# Patient Record
Sex: FEMALE | Race: WHITE | Employment: UNEMPLOYED | URBAN - METROPOLITAN AREA
[De-identification: names, ages, dates, MRNs, and addresses within clinical notes are randomized per-mention and may not be internally consistent; named-entity substitution may affect disease eponyms.]

---

## 2017-02-09 DIAGNOSIS — I10 ESSENTIAL HYPERTENSION: ICD-10-CM

## 2017-02-10 RX ORDER — LISINOPRIL 30 MG/1
TABLET ORAL
Qty: 30 TABLET | Refills: 0 | Status: SHIPPED | OUTPATIENT
Start: 2017-02-10 | End: 2017-03-15 | Stop reason: SDUPTHER

## 2017-03-15 DIAGNOSIS — I10 ESSENTIAL HYPERTENSION: ICD-10-CM

## 2017-03-15 RX ORDER — LISINOPRIL 30 MG/1
TABLET ORAL
Qty: 30 TABLET | Refills: 0 | Status: SHIPPED | OUTPATIENT
Start: 2017-03-15 | End: 2017-04-13 | Stop reason: SDUPTHER

## 2017-04-13 DIAGNOSIS — I10 ESSENTIAL HYPERTENSION: ICD-10-CM

## 2017-04-13 RX ORDER — LISINOPRIL 30 MG/1
TABLET ORAL
Qty: 30 TABLET | Refills: 0 | Status: SHIPPED | OUTPATIENT
Start: 2017-04-13 | End: 2017-05-17 | Stop reason: SDUPTHER

## 2017-05-02 ENCOUNTER — TELEPHONE (OUTPATIENT)
Dept: FAMILY MEDICINE CLINIC | Age: 62
End: 2017-05-02

## 2017-05-02 ENCOUNTER — OFFICE VISIT (OUTPATIENT)
Dept: FAMILY MEDICINE CLINIC | Age: 62
End: 2017-05-02

## 2017-05-02 VITALS
DIASTOLIC BLOOD PRESSURE: 78 MMHG | HEART RATE: 112 BPM | SYSTOLIC BLOOD PRESSURE: 150 MMHG | HEIGHT: 68 IN | BODY MASS INDEX: 31.52 KG/M2 | WEIGHT: 208 LBS | TEMPERATURE: 99.3 F | OXYGEN SATURATION: 98 %

## 2017-05-02 DIAGNOSIS — E11.9 TYPE 2 DIABETES MELLITUS WITHOUT COMPLICATION, WITHOUT LONG-TERM CURRENT USE OF INSULIN (HCC): ICD-10-CM

## 2017-05-02 DIAGNOSIS — M53.3 COCCYDYNIA: ICD-10-CM

## 2017-05-02 DIAGNOSIS — F41.1 GAD (GENERALIZED ANXIETY DISORDER): Primary | ICD-10-CM

## 2017-05-02 DIAGNOSIS — G89.29 CHRONIC PAIN OF LEFT KNEE: ICD-10-CM

## 2017-05-02 DIAGNOSIS — M25.562 CHRONIC PAIN OF LEFT KNEE: ICD-10-CM

## 2017-05-02 DIAGNOSIS — E66.9 OBESITY (BMI 30.0-34.9): ICD-10-CM

## 2017-05-02 DIAGNOSIS — F41.1 GAD (GENERALIZED ANXIETY DISORDER): ICD-10-CM

## 2017-05-02 DIAGNOSIS — I10 ESSENTIAL HYPERTENSION: Primary | ICD-10-CM

## 2017-05-02 LAB
A/G RATIO: 1.6 (ref 1.1–2.2)
ALBUMIN SERPL-MCNC: 4.7 G/DL (ref 3.4–5)
ALP BLD-CCNC: 59 U/L (ref 40–129)
ALT SERPL-CCNC: 20 U/L (ref 10–40)
ANION GAP SERPL CALCULATED.3IONS-SCNC: 16 MMOL/L (ref 3–16)
AST SERPL-CCNC: 17 U/L (ref 15–37)
BILIRUB SERPL-MCNC: 0.5 MG/DL (ref 0–1)
BUN BLDV-MCNC: 19 MG/DL (ref 7–20)
CALCIUM SERPL-MCNC: 9.6 MG/DL (ref 8.3–10.6)
CHLORIDE BLD-SCNC: 104 MMOL/L (ref 99–110)
CHOLESTEROL, TOTAL: 165 MG/DL (ref 0–199)
CO2: 22 MMOL/L (ref 21–32)
CREAT SERPL-MCNC: 0.5 MG/DL (ref 0.6–1.2)
CREATININE URINE: 128.9 MG/DL (ref 28–259)
GFR AFRICAN AMERICAN: >60
GFR NON-AFRICAN AMERICAN: >60
GLOBULIN: 3 G/DL
GLUCOSE BLD-MCNC: 136 MG/DL (ref 70–99)
HDLC SERPL-MCNC: 59 MG/DL (ref 40–60)
LDL CHOLESTEROL CALCULATED: 89 MG/DL
MICROALBUMIN UR-MCNC: <1.2 MG/DL
MICROALBUMIN/CREAT UR-RTO: NORMAL MG/G (ref 0–30)
POTASSIUM SERPL-SCNC: 3.8 MMOL/L (ref 3.5–5.1)
SODIUM BLD-SCNC: 142 MMOL/L (ref 136–145)
TOTAL PROTEIN: 7.7 G/DL (ref 6.4–8.2)
TRIGL SERPL-MCNC: 85 MG/DL (ref 0–150)
VLDLC SERPL CALC-MCNC: 17 MG/DL

## 2017-05-02 PROCEDURE — 99214 OFFICE O/P EST MOD 30 MIN: CPT | Performed by: FAMILY MEDICINE

## 2017-05-02 PROCEDURE — 36415 COLL VENOUS BLD VENIPUNCTURE: CPT | Performed by: FAMILY MEDICINE

## 2017-05-02 RX ORDER — ALPRAZOLAM 0.5 MG/1
0.5 TABLET ORAL DAILY PRN
Qty: 15 TABLET | Refills: 0 | Status: SHIPPED | OUTPATIENT
Start: 2017-05-02 | End: 2017-06-01

## 2017-05-03 LAB
ESTIMATED AVERAGE GLUCOSE: 148.5 MG/DL
HBA1C MFR BLD: 6.8 %

## 2017-06-08 ENCOUNTER — TELEPHONE (OUTPATIENT)
Dept: FAMILY MEDICINE CLINIC | Age: 62
End: 2017-06-08

## 2017-11-20 PROBLEM — Z91.199 NONCOMPLIANCE BY REFUSING INTERVENTION OR SUPPORT: Status: ACTIVE | Noted: 2017-11-20

## 2017-11-21 ENCOUNTER — OFFICE VISIT (OUTPATIENT)
Dept: FAMILY MEDICINE CLINIC | Age: 62
End: 2017-11-21

## 2017-11-21 VITALS
TEMPERATURE: 98 F | OXYGEN SATURATION: 98 % | SYSTOLIC BLOOD PRESSURE: 138 MMHG | BODY MASS INDEX: 30.31 KG/M2 | HEIGHT: 68 IN | HEART RATE: 119 BPM | WEIGHT: 200 LBS | DIASTOLIC BLOOD PRESSURE: 78 MMHG

## 2017-11-21 DIAGNOSIS — F41.9 ANXIETY: ICD-10-CM

## 2017-11-21 DIAGNOSIS — Z23 NEED FOR INFLUENZA VACCINATION: ICD-10-CM

## 2017-11-21 DIAGNOSIS — Z00.00 PREVENTATIVE HEALTH CARE: ICD-10-CM

## 2017-11-21 DIAGNOSIS — E11.9 TYPE 2 DIABETES MELLITUS WITHOUT COMPLICATION, WITHOUT LONG-TERM CURRENT USE OF INSULIN (HCC): ICD-10-CM

## 2017-11-21 DIAGNOSIS — I10 ESSENTIAL HYPERTENSION: Primary | ICD-10-CM

## 2017-11-21 LAB — HBA1C MFR BLD: 6.4 %

## 2017-11-21 PROCEDURE — 90630 INFLUENZA, QUADV, 18-64 YRS, ID, PF, MICRO INJ, 0.1ML (FLUZONE QUADV, PF): CPT | Performed by: FAMILY MEDICINE

## 2017-11-21 PROCEDURE — 90471 IMMUNIZATION ADMIN: CPT | Performed by: FAMILY MEDICINE

## 2017-11-21 PROCEDURE — 83036 HEMOGLOBIN GLYCOSYLATED A1C: CPT | Performed by: FAMILY MEDICINE

## 2017-11-21 PROCEDURE — 99214 OFFICE O/P EST MOD 30 MIN: CPT | Performed by: FAMILY MEDICINE

## 2017-11-21 RX ORDER — LISINOPRIL 30 MG/1
TABLET ORAL
Qty: 30 TABLET | Refills: 5 | Status: SHIPPED | OUTPATIENT
Start: 2017-11-21 | End: 2018-05-29 | Stop reason: SDUPTHER

## 2017-11-21 ASSESSMENT — ENCOUNTER SYMPTOMS
SORE THROAT: 0
ROS SKIN COMMENTS: DRY HANDS

## 2017-11-21 NOTE — PATIENT INSTRUCTIONS
Cognitive behavioral therapy - discuss w/ therapist.    Patient Education        Learning About Diabetes Food Guidelines  Your Care Instructions  Meal planning is important to manage diabetes. It helps keep your blood sugar at a target level (which you set with your doctor). You don't have to eat special foods. You can eat what your family eats, including sweets once in a while. But you do have to pay attention to how often you eat and how much you eat of certain foods. You may want to work with a dietitian or a certified diabetes educator (CDE) to help you plan meals and snacks. A dietitian or CDE can also help you lose weight if that is one of your goals. What should you know about eating carbs? Managing the amount of carbohydrate (carbs) you eat is an important part of healthy meals when you have diabetes. Carbohydrate is found in many foods. · Learn which foods have carbs. And learn the amounts of carbs in different foods. ¨ Bread, cereal, pasta, and rice have about 15 grams of carbs in a serving. A serving is 1 slice of bread (1 ounce), ½ cup of cooked cereal, or 1/3 cup of cooked pasta or rice. ¨ Fruits have 15 grams of carbs in a serving. A serving is 1 small fresh fruit, such as an apple or orange; ½ of a banana; ½ cup of cooked or canned fruit; ½ cup of fruit juice; 1 cup of melon or raspberries; or 2 tablespoons of dried fruit. ¨ Milk and no-sugar-added yogurt have 15 grams of carbs in a serving. A serving is 1 cup of milk or 2/3 cup of no-sugar-added yogurt. ¨ Starchy vegetables have 15 grams of carbs in a serving. A serving is ½ cup of mashed potatoes or sweet potato; 1 cup winter squash; ½ of a small baked potato; ½ cup of cooked beans; or ½ cup cooked corn or green peas. · Learn how much carbs to eat each day and at each meal. A dietitian or CDE can teach you how to keep track of the amount of carbs you eat. This is called carbohydrate counting.   · If you are not sure how to count carbohydrate grams, use the Plate Method to plan meals. It is a good, quick way to make sure that you have a balanced meal. It also helps you spread carbs throughout the day. ¨ Divide your plate by types of foods. Put non-starchy vegetables on half the plate, meat or other protein food on one-quarter of the plate, and a grain or starchy vegetable in the final quarter of the plate. To this you can add a small piece of fruit and 1 cup of milk or yogurt, depending on how many carbs you are supposed to eat at a meal.  · Try to eat about the same amount of carbs at each meal. Do not \"save up\" your daily allowance of carbs to eat at one meal.  · Proteins have very little or no carbs per serving. Examples of proteins are beef, chicken, turkey, fish, eggs, tofu, cheese, cottage cheese, and peanut butter. A serving size of meat is 3 ounces, which is about the size of a deck of cards. Examples of meat substitute serving sizes (equal to 1 ounce of meat) are 1/4 cup of cottage cheese, 1 egg, 1 tablespoon of peanut butter, and ½ cup of tofu. How can you eat out and still eat healthy? · Learn to estimate the serving sizes of foods that have carbohydrate. If you measure food at home, it will be easier to estimate the amount in a serving of restaurant food. · If the meal you order has too much carbohydrate (such as potatoes, corn, or baked beans), ask to have a low-carbohydrate food instead. Ask for a salad or green vegetables. · If you use insulin, check your blood sugar before and after eating out to help you plan how much to eat in the future. · If you eat more carbohydrate at a meal than you had planned, take a walk or do other exercise. This will help lower your blood sugar. What else should you know? · Limit saturated fat, such as the fat from meat and dairy products. This is a healthy choice because people who have diabetes are at higher risk of heart disease. So choose lean cuts of meat and nonfat or low-fat dairy products.  Use

## 2017-11-21 NOTE — PROGRESS NOTES
Subjective:      Patient ID: Saundra Graham is a 57 y.o. female. HPI Hypertension -     patient is here for f/u. No c/o. No problem w/ meds; denies any symptoms referable to elevated blood pressure. Denies side effects of meds. Needs refills. Diabetes mellitus 2 - doing well w/ current diet. Needs labs to check status. eczema - washes hands often. Uses Aveeno lotion only. Anxiety - Has repeatedly refuse medications. Continue to see her counselor. This counseling sessions involve her mostly relating what stressful events occurred the previous 2 weeks. The counselor does not offer any and's or advice for change usually nor ways that she might relieve her anxiety prior to stressful situation. She says that she has been going to this same counselor since before she was even . HM - patient refuses all preventative health care. She mostly does this because they will provoke her anxiety. She is always worried that the test may come back abnormal.    Review of Systems   Constitutional: Negative for unexpected weight change. HENT: Negative for sore throat. Endocrine: Negative for polydipsia and polyuria. Skin:        Dry hands   Allergic/Immunologic: Negative for immunocompromised state. Psychiatric/Behavioral: The patient is nervous/anxious. Objective:   Physical Exam   Constitutional: She is oriented to person, place, and time. She appears well-developed and well-nourished. HENT:   Head: Normocephalic and atraumatic. Eyes: Conjunctivae are normal. No scleral icterus. Neck: Neck supple. Carotid bruit is not present. No thyromegaly present. Cardiovascular: Normal rate, regular rhythm and normal heart sounds. Pulmonary/Chest: Effort normal and breath sounds normal.   Musculoskeletal: She exhibits no edema. Lymphadenopathy:     She has no cervical adenopathy. Neurological: She is alert and oriented to person, place, and time. Skin: Skin is warm and dry.  Rash

## 2018-05-29 DIAGNOSIS — I10 ESSENTIAL HYPERTENSION: ICD-10-CM

## 2018-05-29 RX ORDER — LISINOPRIL 30 MG/1
TABLET ORAL
Qty: 30 TABLET | Refills: 0 | Status: SHIPPED | OUTPATIENT
Start: 2018-05-29 | End: 2018-06-28 | Stop reason: SDUPTHER

## 2018-06-28 DIAGNOSIS — I10 ESSENTIAL HYPERTENSION: ICD-10-CM

## 2018-06-28 RX ORDER — LISINOPRIL 30 MG/1
TABLET ORAL
Qty: 30 TABLET | Refills: 0 | Status: SHIPPED | OUTPATIENT
Start: 2018-06-28 | End: 2018-08-28 | Stop reason: SDUPTHER

## 2018-08-28 DIAGNOSIS — I10 ESSENTIAL HYPERTENSION: ICD-10-CM

## 2018-08-28 RX ORDER — LISINOPRIL 30 MG/1
TABLET ORAL
Qty: 30 TABLET | Refills: 0 | OUTPATIENT
Start: 2018-08-28

## 2018-08-28 RX ORDER — LISINOPRIL 30 MG/1
TABLET ORAL
Qty: 30 TABLET | Refills: 1 | Status: SHIPPED | OUTPATIENT
Start: 2018-08-28 | End: 2018-10-05 | Stop reason: SDUPTHER

## 2018-10-05 ENCOUNTER — OFFICE VISIT (OUTPATIENT)
Dept: FAMILY MEDICINE CLINIC | Age: 63
End: 2018-10-05
Payer: MEDICARE

## 2018-10-05 VITALS
BODY MASS INDEX: 32.78 KG/M2 | HEART RATE: 116 BPM | WEIGHT: 204 LBS | HEIGHT: 66 IN | OXYGEN SATURATION: 98 % | DIASTOLIC BLOOD PRESSURE: 86 MMHG | SYSTOLIC BLOOD PRESSURE: 150 MMHG

## 2018-10-05 DIAGNOSIS — I10 ESSENTIAL HYPERTENSION: ICD-10-CM

## 2018-10-05 DIAGNOSIS — G89.29 CHRONIC PAIN OF LEFT KNEE: ICD-10-CM

## 2018-10-05 DIAGNOSIS — Z12.39 SCREENING FOR BREAST CANCER: ICD-10-CM

## 2018-10-05 DIAGNOSIS — F41.9 ANXIETY: ICD-10-CM

## 2018-10-05 DIAGNOSIS — Z23 NEED FOR INFLUENZA VACCINATION: ICD-10-CM

## 2018-10-05 DIAGNOSIS — M25.562 CHRONIC PAIN OF LEFT KNEE: ICD-10-CM

## 2018-10-05 DIAGNOSIS — M25.571 ACUTE RIGHT ANKLE PAIN: ICD-10-CM

## 2018-10-05 DIAGNOSIS — E11.9 TYPE 2 DIABETES MELLITUS WITHOUT COMPLICATION, WITHOUT LONG-TERM CURRENT USE OF INSULIN (HCC): Primary | ICD-10-CM

## 2018-10-05 LAB
A/G RATIO: 1.9 (ref 1.1–2.2)
ALBUMIN SERPL-MCNC: 5 G/DL (ref 3.4–5)
ALP BLD-CCNC: 56 U/L (ref 40–129)
ALT SERPL-CCNC: 17 U/L (ref 10–40)
ANION GAP SERPL CALCULATED.3IONS-SCNC: 16 MMOL/L (ref 3–16)
AST SERPL-CCNC: 17 U/L (ref 15–37)
BASOPHILS ABSOLUTE: 0 K/UL (ref 0–0.2)
BASOPHILS RELATIVE PERCENT: 0.8 %
BILIRUB SERPL-MCNC: 0.4 MG/DL (ref 0–1)
BUN BLDV-MCNC: 21 MG/DL (ref 7–20)
CALCIUM SERPL-MCNC: 9.8 MG/DL (ref 8.3–10.6)
CHLORIDE BLD-SCNC: 104 MMOL/L (ref 99–110)
CHOLESTEROL, TOTAL: 183 MG/DL (ref 0–199)
CO2: 22 MMOL/L (ref 21–32)
CREAT SERPL-MCNC: 0.6 MG/DL (ref 0.6–1.2)
EOSINOPHILS ABSOLUTE: 0.1 K/UL (ref 0–0.6)
EOSINOPHILS RELATIVE PERCENT: 2 %
GFR AFRICAN AMERICAN: >60
GFR NON-AFRICAN AMERICAN: >60
GLOBULIN: 2.6 G/DL
GLUCOSE BLD-MCNC: 152 MG/DL (ref 70–99)
HCT VFR BLD CALC: 40.6 % (ref 36–48)
HDLC SERPL-MCNC: 55 MG/DL (ref 40–60)
HEMOGLOBIN: 13.6 G/DL (ref 12–16)
LDL CHOLESTEROL CALCULATED: 112 MG/DL
LYMPHOCYTES ABSOLUTE: 1.5 K/UL (ref 1–5.1)
LYMPHOCYTES RELATIVE PERCENT: 32.4 %
MCH RBC QN AUTO: 30 PG (ref 26–34)
MCHC RBC AUTO-ENTMCNC: 33.5 G/DL (ref 31–36)
MCV RBC AUTO: 89.7 FL (ref 80–100)
MONOCYTES ABSOLUTE: 0.3 K/UL (ref 0–1.3)
MONOCYTES RELATIVE PERCENT: 6.9 %
NEUTROPHILS ABSOLUTE: 2.7 K/UL (ref 1.7–7.7)
NEUTROPHILS RELATIVE PERCENT: 57.9 %
PDW BLD-RTO: 13.5 % (ref 12.4–15.4)
PLATELET # BLD: 213 K/UL (ref 135–450)
PMV BLD AUTO: 7.2 FL (ref 5–10.5)
POTASSIUM SERPL-SCNC: 4 MMOL/L (ref 3.5–5.1)
RBC # BLD: 4.53 M/UL (ref 4–5.2)
SODIUM BLD-SCNC: 142 MMOL/L (ref 136–145)
TOTAL PROTEIN: 7.6 G/DL (ref 6.4–8.2)
TRIGL SERPL-MCNC: 78 MG/DL (ref 0–150)
VLDLC SERPL CALC-MCNC: 16 MG/DL
WBC # BLD: 4.6 K/UL (ref 4–11)

## 2018-10-05 PROCEDURE — 3017F COLORECTAL CA SCREEN DOC REV: CPT | Performed by: NURSE PRACTITIONER

## 2018-10-05 PROCEDURE — G8417 CALC BMI ABV UP PARAM F/U: HCPCS | Performed by: NURSE PRACTITIONER

## 2018-10-05 PROCEDURE — 99214 OFFICE O/P EST MOD 30 MIN: CPT | Performed by: NURSE PRACTITIONER

## 2018-10-05 PROCEDURE — 36415 COLL VENOUS BLD VENIPUNCTURE: CPT | Performed by: NURSE PRACTITIONER

## 2018-10-05 PROCEDURE — 1090F PRES/ABSN URINE INCON ASSESS: CPT | Performed by: NURSE PRACTITIONER

## 2018-10-05 PROCEDURE — 2022F DILAT RTA XM EVC RTNOPTHY: CPT | Performed by: NURSE PRACTITIONER

## 2018-10-05 PROCEDURE — 90662 IIV NO PRSV INCREASED AG IM: CPT | Performed by: NURSE PRACTITIONER

## 2018-10-05 PROCEDURE — G8400 PT W/DXA NO RESULTS DOC: HCPCS | Performed by: NURSE PRACTITIONER

## 2018-10-05 PROCEDURE — 1036F TOBACCO NON-USER: CPT | Performed by: NURSE PRACTITIONER

## 2018-10-05 PROCEDURE — G8427 DOCREV CUR MEDS BY ELIG CLIN: HCPCS | Performed by: NURSE PRACTITIONER

## 2018-10-05 PROCEDURE — 1101F PT FALLS ASSESS-DOCD LE1/YR: CPT | Performed by: NURSE PRACTITIONER

## 2018-10-05 PROCEDURE — G8482 FLU IMMUNIZE ORDER/ADMIN: HCPCS | Performed by: NURSE PRACTITIONER

## 2018-10-05 PROCEDURE — 1123F ACP DISCUSS/DSCN MKR DOCD: CPT | Performed by: NURSE PRACTITIONER

## 2018-10-05 PROCEDURE — 3046F HEMOGLOBIN A1C LEVEL >9.0%: CPT | Performed by: NURSE PRACTITIONER

## 2018-10-05 PROCEDURE — G0008 ADMIN INFLUENZA VIRUS VAC: HCPCS | Performed by: NURSE PRACTITIONER

## 2018-10-05 PROCEDURE — 4040F PNEUMOC VAC/ADMIN/RCVD: CPT | Performed by: NURSE PRACTITIONER

## 2018-10-05 RX ORDER — LISINOPRIL 30 MG/1
TABLET ORAL
Qty: 30 TABLET | Refills: 5 | Status: SHIPPED | OUTPATIENT
Start: 2018-10-05 | End: 2019-04-11 | Stop reason: SDUPTHER

## 2018-10-05 RX ORDER — IBUPROFEN 800 MG/1
800 TABLET ORAL EVERY 8 HOURS PRN
Qty: 120 TABLET | Refills: 0 | Status: SHIPPED | OUTPATIENT
Start: 2018-10-05 | End: 2019-11-20

## 2018-10-05 RX ORDER — LISINOPRIL 5 MG/1
5 TABLET ORAL DAILY
Qty: 30 TABLET | Refills: 3 | Status: SHIPPED | OUTPATIENT
Start: 2018-10-05 | End: 2019-01-17 | Stop reason: SDUPTHER

## 2018-10-05 ASSESSMENT — ENCOUNTER SYMPTOMS
BLOOD IN STOOL: 0
CONSTIPATION: 0
SHORTNESS OF BREATH: 0
DIARRHEA: 0
BLURRED VISION: 0

## 2018-10-05 ASSESSMENT — PATIENT HEALTH QUESTIONNAIRE - PHQ9
2. FEELING DOWN, DEPRESSED OR HOPELESS: 0
1. LITTLE INTEREST OR PLEASURE IN DOING THINGS: 0
SUM OF ALL RESPONSES TO PHQ9 QUESTIONS 1 & 2: 0
SUM OF ALL RESPONSES TO PHQ QUESTIONS 1-9: 0
SUM OF ALL RESPONSES TO PHQ QUESTIONS 1-9: 0

## 2018-10-06 LAB
CREATININE URINE: 78.4 MG/DL (ref 28–259)
ESTIMATED AVERAGE GLUCOSE: 157.1 MG/DL
HBA1C MFR BLD: 7.1 %
MICROALBUMIN UR-MCNC: <1.2 MG/DL
MICROALBUMIN/CREAT UR-RTO: NORMAL MG/G (ref 0–30)

## 2019-04-11 DIAGNOSIS — I10 ESSENTIAL HYPERTENSION: ICD-10-CM

## 2019-04-11 RX ORDER — LISINOPRIL 30 MG/1
TABLET ORAL
Qty: 30 TABLET | Refills: 4 | Status: SHIPPED | OUTPATIENT
Start: 2019-04-11 | End: 2019-08-22 | Stop reason: SDUPTHER

## 2019-05-31 DIAGNOSIS — I10 ESSENTIAL HYPERTENSION: ICD-10-CM

## 2019-05-31 RX ORDER — LISINOPRIL 5 MG/1
TABLET ORAL
Qty: 30 TABLET | Refills: 2 | Status: SHIPPED | OUTPATIENT
Start: 2019-05-31 | End: 2019-10-28 | Stop reason: ALTCHOICE

## 2019-08-27 ENCOUNTER — OFFICE VISIT (OUTPATIENT)
Dept: ORTHOPEDIC SURGERY | Age: 64
End: 2019-08-27
Payer: MEDICARE

## 2019-08-27 VITALS
HEIGHT: 66 IN | DIASTOLIC BLOOD PRESSURE: 94 MMHG | HEART RATE: 101 BPM | WEIGHT: 203.93 LBS | SYSTOLIC BLOOD PRESSURE: 145 MMHG | BODY MASS INDEX: 32.77 KG/M2

## 2019-08-27 DIAGNOSIS — M21.41 PES PLANUS OF RIGHT FOOT: ICD-10-CM

## 2019-08-27 DIAGNOSIS — M79.671 RIGHT FOOT PAIN: Primary | ICD-10-CM

## 2019-08-27 PROCEDURE — 1123F ACP DISCUSS/DSCN MKR DOCD: CPT | Performed by: NURSE PRACTITIONER

## 2019-08-27 PROCEDURE — 1090F PRES/ABSN URINE INCON ASSESS: CPT | Performed by: NURSE PRACTITIONER

## 2019-08-27 PROCEDURE — 99213 OFFICE O/P EST LOW 20 MIN: CPT | Performed by: NURSE PRACTITIONER

## 2019-08-27 PROCEDURE — G8427 DOCREV CUR MEDS BY ELIG CLIN: HCPCS | Performed by: NURSE PRACTITIONER

## 2019-08-27 PROCEDURE — G8417 CALC BMI ABV UP PARAM F/U: HCPCS | Performed by: NURSE PRACTITIONER

## 2019-08-27 PROCEDURE — G8400 PT W/DXA NO RESULTS DOC: HCPCS | Performed by: NURSE PRACTITIONER

## 2019-08-27 PROCEDURE — 1036F TOBACCO NON-USER: CPT | Performed by: NURSE PRACTITIONER

## 2019-08-27 PROCEDURE — 4040F PNEUMOC VAC/ADMIN/RCVD: CPT | Performed by: NURSE PRACTITIONER

## 2019-08-27 PROCEDURE — 3017F COLORECTAL CA SCREEN DOC REV: CPT | Performed by: NURSE PRACTITIONER

## 2019-10-02 ENCOUNTER — HOSPITAL ENCOUNTER (OUTPATIENT)
Dept: PHYSICAL THERAPY | Age: 64
Setting detail: THERAPIES SERIES
Discharge: HOME | End: 2019-10-02
Payer: MEDICARE

## 2019-10-02 PROCEDURE — 97161 PT EVAL LOW COMPLEX 20 MIN: CPT

## 2019-10-02 PROCEDURE — 97110 THERAPEUTIC EXERCISES: CPT

## 2019-10-07 ENCOUNTER — HOSPITAL ENCOUNTER (OUTPATIENT)
Dept: PHYSICAL THERAPY | Age: 64
Setting detail: THERAPIES SERIES
Discharge: HOME | End: 2019-10-07
Payer: MEDICARE

## 2019-10-07 PROCEDURE — 97110 THERAPEUTIC EXERCISES: CPT

## 2019-10-07 PROCEDURE — 97140 MANUAL THERAPY 1/> REGIONS: CPT

## 2019-10-10 ENCOUNTER — HOSPITAL ENCOUNTER (OUTPATIENT)
Dept: PHYSICAL THERAPY | Age: 64
Setting detail: THERAPIES SERIES
Discharge: HOME | End: 2019-10-10
Payer: MEDICARE

## 2019-10-10 PROCEDURE — 97140 MANUAL THERAPY 1/> REGIONS: CPT

## 2019-10-10 PROCEDURE — 97110 THERAPEUTIC EXERCISES: CPT

## 2019-10-14 ENCOUNTER — HOSPITAL ENCOUNTER (OUTPATIENT)
Dept: PHYSICAL THERAPY | Age: 64
Setting detail: THERAPIES SERIES
Discharge: HOME | End: 2019-10-14
Payer: MEDICARE

## 2019-10-14 PROCEDURE — 97110 THERAPEUTIC EXERCISES: CPT | Performed by: PHYSICAL THERAPIST

## 2019-10-14 PROCEDURE — 97140 MANUAL THERAPY 1/> REGIONS: CPT | Performed by: PHYSICAL THERAPIST

## 2019-10-17 ENCOUNTER — HOSPITAL ENCOUNTER (OUTPATIENT)
Dept: PHYSICAL THERAPY | Age: 64
Setting detail: THERAPIES SERIES
Discharge: HOME | End: 2019-10-17
Payer: MEDICARE

## 2019-10-17 PROCEDURE — 97140 MANUAL THERAPY 1/> REGIONS: CPT

## 2019-10-17 PROCEDURE — 97110 THERAPEUTIC EXERCISES: CPT

## 2019-10-17 PROCEDURE — 97112 NEUROMUSCULAR REEDUCATION: CPT

## 2019-10-21 ENCOUNTER — HOSPITAL ENCOUNTER (OUTPATIENT)
Dept: PHYSICAL THERAPY | Age: 64
Setting detail: THERAPIES SERIES
Discharge: HOME | End: 2019-10-21
Payer: MEDICARE

## 2019-10-21 PROCEDURE — 97110 THERAPEUTIC EXERCISES: CPT | Performed by: PHYSICAL THERAPIST

## 2019-10-21 PROCEDURE — 97140 MANUAL THERAPY 1/> REGIONS: CPT | Performed by: PHYSICAL THERAPIST

## 2019-10-24 ENCOUNTER — HOSPITAL ENCOUNTER (OUTPATIENT)
Dept: PHYSICAL THERAPY | Age: 64
Setting detail: THERAPIES SERIES
Discharge: HOME | End: 2019-10-24
Payer: MEDICARE

## 2019-10-24 PROCEDURE — 97140 MANUAL THERAPY 1/> REGIONS: CPT

## 2019-10-24 PROCEDURE — 97110 THERAPEUTIC EXERCISES: CPT

## 2019-10-28 ENCOUNTER — HOSPITAL ENCOUNTER (OUTPATIENT)
Dept: PHYSICAL THERAPY | Age: 64
Setting detail: THERAPIES SERIES
Discharge: HOME | End: 2019-10-28
Payer: MEDICARE

## 2019-10-28 DIAGNOSIS — I10 ESSENTIAL HYPERTENSION: ICD-10-CM

## 2019-10-28 PROCEDURE — 97140 MANUAL THERAPY 1/> REGIONS: CPT | Performed by: PHYSICAL THERAPIST

## 2019-10-28 PROCEDURE — 97110 THERAPEUTIC EXERCISES: CPT | Performed by: PHYSICAL THERAPIST

## 2019-10-28 RX ORDER — LISINOPRIL 30 MG/1
30 TABLET ORAL DAILY
Qty: 90 TABLET | Refills: 0 | Status: SHIPPED | OUTPATIENT
Start: 2019-10-28 | End: 2019-11-27 | Stop reason: SDUPTHER

## 2019-10-31 ENCOUNTER — HOSPITAL ENCOUNTER (OUTPATIENT)
Dept: PHYSICAL THERAPY | Age: 64
Setting detail: THERAPIES SERIES
Discharge: HOME | End: 2019-10-31
Payer: MEDICARE

## 2019-10-31 PROCEDURE — 97112 NEUROMUSCULAR REEDUCATION: CPT

## 2019-10-31 PROCEDURE — 97140 MANUAL THERAPY 1/> REGIONS: CPT

## 2019-10-31 PROCEDURE — 97110 THERAPEUTIC EXERCISES: CPT

## 2019-11-05 ENCOUNTER — HOSPITAL ENCOUNTER (OUTPATIENT)
Dept: PHYSICAL THERAPY | Age: 64
Setting detail: THERAPIES SERIES
Discharge: HOME | End: 2019-11-05
Payer: MEDICARE

## 2019-11-05 PROCEDURE — 97140 MANUAL THERAPY 1/> REGIONS: CPT

## 2019-11-05 PROCEDURE — 97110 THERAPEUTIC EXERCISES: CPT

## 2019-11-06 ENCOUNTER — APPOINTMENT (OUTPATIENT)
Dept: PHYSICAL THERAPY | Age: 64
End: 2019-11-06
Payer: MEDICARE

## 2019-11-08 ENCOUNTER — HOSPITAL ENCOUNTER (OUTPATIENT)
Dept: PHYSICAL THERAPY | Age: 64
Setting detail: THERAPIES SERIES
Discharge: HOME | End: 2019-11-08
Payer: MEDICARE

## 2019-11-11 ENCOUNTER — HOSPITAL ENCOUNTER (OUTPATIENT)
Dept: PHYSICAL THERAPY | Age: 64
Setting detail: THERAPIES SERIES
Discharge: HOME | End: 2019-11-11
Payer: MEDICARE

## 2019-11-11 PROCEDURE — 97110 THERAPEUTIC EXERCISES: CPT | Performed by: PHYSICAL THERAPIST

## 2019-11-11 PROCEDURE — 97140 MANUAL THERAPY 1/> REGIONS: CPT | Performed by: PHYSICAL THERAPIST

## 2019-11-14 ENCOUNTER — HOSPITAL ENCOUNTER (OUTPATIENT)
Dept: PHYSICAL THERAPY | Age: 64
Setting detail: THERAPIES SERIES
Discharge: HOME | End: 2019-11-14
Payer: MEDICARE

## 2019-11-14 PROCEDURE — 97110 THERAPEUTIC EXERCISES: CPT

## 2019-11-14 PROCEDURE — 97140 MANUAL THERAPY 1/> REGIONS: CPT

## 2019-11-18 ENCOUNTER — APPOINTMENT (OUTPATIENT)
Dept: PHYSICAL THERAPY | Age: 64
End: 2019-11-18
Payer: MEDICARE

## 2019-11-20 ENCOUNTER — OFFICE VISIT (OUTPATIENT)
Dept: ORTHOPEDIC SURGERY | Age: 64
End: 2019-11-20
Payer: MEDICARE

## 2019-11-20 VITALS
BODY MASS INDEX: 32.77 KG/M2 | HEART RATE: 112 BPM | HEIGHT: 66 IN | WEIGHT: 203.93 LBS | DIASTOLIC BLOOD PRESSURE: 92 MMHG | SYSTOLIC BLOOD PRESSURE: 153 MMHG

## 2019-11-20 DIAGNOSIS — M21.41 PES PLANUS OF BOTH FEET: ICD-10-CM

## 2019-11-20 DIAGNOSIS — M19.071 PRIMARY OSTEOARTHRITIS OF RIGHT FOOT: Primary | ICD-10-CM

## 2019-11-20 DIAGNOSIS — R29.898 ANKLE WEAKNESS: ICD-10-CM

## 2019-11-20 DIAGNOSIS — M21.42 PES PLANUS OF BOTH FEET: ICD-10-CM

## 2019-11-20 PROCEDURE — 1123F ACP DISCUSS/DSCN MKR DOCD: CPT | Performed by: FAMILY MEDICINE

## 2019-11-20 PROCEDURE — 99203 OFFICE O/P NEW LOW 30 MIN: CPT | Performed by: FAMILY MEDICINE

## 2019-11-20 PROCEDURE — G8427 DOCREV CUR MEDS BY ELIG CLIN: HCPCS | Performed by: FAMILY MEDICINE

## 2019-11-20 PROCEDURE — G8417 CALC BMI ABV UP PARAM F/U: HCPCS | Performed by: FAMILY MEDICINE

## 2019-11-20 PROCEDURE — 1090F PRES/ABSN URINE INCON ASSESS: CPT | Performed by: FAMILY MEDICINE

## 2019-11-20 PROCEDURE — G8400 PT W/DXA NO RESULTS DOC: HCPCS | Performed by: FAMILY MEDICINE

## 2019-11-20 PROCEDURE — 3017F COLORECTAL CA SCREEN DOC REV: CPT | Performed by: FAMILY MEDICINE

## 2019-11-20 PROCEDURE — 1036F TOBACCO NON-USER: CPT | Performed by: FAMILY MEDICINE

## 2019-11-20 PROCEDURE — G8484 FLU IMMUNIZE NO ADMIN: HCPCS | Performed by: FAMILY MEDICINE

## 2019-11-20 PROCEDURE — 4040F PNEUMOC VAC/ADMIN/RCVD: CPT | Performed by: FAMILY MEDICINE

## 2019-11-20 RX ORDER — DICLOFENAC SODIUM 75 MG/1
75 TABLET, DELAYED RELEASE ORAL 2 TIMES DAILY
Qty: 60 TABLET | Refills: 3 | Status: SHIPPED
Start: 2019-11-20 | End: 2020-02-17 | Stop reason: CLARIF

## 2019-11-20 RX ORDER — METHYLPREDNISOLONE 4 MG/1
TABLET ORAL
Qty: 21 KIT | Refills: 0 | Status: SHIPPED | OUTPATIENT
Start: 2019-11-20 | End: 2020-02-17

## 2019-11-21 ENCOUNTER — HOSPITAL ENCOUNTER (OUTPATIENT)
Dept: PHYSICAL THERAPY | Age: 64
Setting detail: THERAPIES SERIES
Discharge: HOME | End: 2019-11-21
Payer: MEDICARE

## 2019-11-21 PROCEDURE — 97110 THERAPEUTIC EXERCISES: CPT

## 2019-11-21 PROCEDURE — 97112 NEUROMUSCULAR REEDUCATION: CPT

## 2019-11-25 ENCOUNTER — HOSPITAL ENCOUNTER (OUTPATIENT)
Dept: PHYSICAL THERAPY | Age: 64
Setting detail: THERAPIES SERIES
Discharge: HOME | End: 2019-11-25
Payer: MEDICARE

## 2019-11-25 PROCEDURE — 97140 MANUAL THERAPY 1/> REGIONS: CPT | Performed by: PHYSICAL THERAPIST

## 2019-11-25 PROCEDURE — 97110 THERAPEUTIC EXERCISES: CPT | Performed by: PHYSICAL THERAPIST

## 2019-11-27 DIAGNOSIS — I10 ESSENTIAL HYPERTENSION: ICD-10-CM

## 2019-11-27 RX ORDER — LISINOPRIL 30 MG/1
30 TABLET ORAL DAILY
Qty: 90 TABLET | Refills: 0 | Status: SHIPPED | OUTPATIENT
Start: 2019-11-27 | End: 2020-02-17 | Stop reason: SDUPTHER

## 2019-12-05 ENCOUNTER — HOSPITAL ENCOUNTER (OUTPATIENT)
Dept: PHYSICAL THERAPY | Age: 64
Setting detail: THERAPIES SERIES
Discharge: HOME | End: 2019-12-05
Payer: MEDICARE

## 2019-12-05 PROCEDURE — 97110 THERAPEUTIC EXERCISES: CPT | Performed by: PHYSICAL THERAPIST

## 2019-12-05 PROCEDURE — 97140 MANUAL THERAPY 1/> REGIONS: CPT | Performed by: PHYSICAL THERAPIST

## 2019-12-12 ENCOUNTER — APPOINTMENT (OUTPATIENT)
Dept: PHYSICAL THERAPY | Age: 64
End: 2019-12-12
Payer: MEDICARE

## 2019-12-18 DIAGNOSIS — I10 ESSENTIAL HYPERTENSION: ICD-10-CM

## 2019-12-18 RX ORDER — LISINOPRIL 5 MG/1
TABLET ORAL
Qty: 30 TABLET | Refills: 0 | Status: SHIPPED | OUTPATIENT
Start: 2019-12-18 | End: 2019-12-18 | Stop reason: SDUPTHER

## 2019-12-18 RX ORDER — LISINOPRIL 5 MG/1
TABLET ORAL
Qty: 30 TABLET | Refills: 0 | Status: SHIPPED | OUTPATIENT
Start: 2019-12-18 | End: 2020-01-29 | Stop reason: SDUPTHER

## 2019-12-19 ENCOUNTER — APPOINTMENT (OUTPATIENT)
Dept: PHYSICAL THERAPY | Age: 64
End: 2019-12-19
Payer: MEDICARE

## 2019-12-23 ENCOUNTER — TELEPHONE (OUTPATIENT)
Dept: ORTHOPEDIC SURGERY | Age: 64
End: 2019-12-23

## 2020-01-29 RX ORDER — LISINOPRIL 5 MG/1
TABLET ORAL
Qty: 30 TABLET | Refills: 0 | Status: SHIPPED | OUTPATIENT
Start: 2020-01-29 | End: 2020-02-17 | Stop reason: ALTCHOICE

## 2020-02-17 ENCOUNTER — OFFICE VISIT (OUTPATIENT)
Dept: FAMILY MEDICINE CLINIC | Age: 65
End: 2020-02-17
Payer: MEDICARE

## 2020-02-17 VITALS
DIASTOLIC BLOOD PRESSURE: 80 MMHG | WEIGHT: 217 LBS | SYSTOLIC BLOOD PRESSURE: 162 MMHG | OXYGEN SATURATION: 98 % | HEART RATE: 117 BPM | BODY MASS INDEX: 35.51 KG/M2 | TEMPERATURE: 98.3 F

## 2020-02-17 PROCEDURE — 2022F DILAT RTA XM EVC RTNOPTHY: CPT | Performed by: NURSE PRACTITIONER

## 2020-02-17 PROCEDURE — G8417 CALC BMI ABV UP PARAM F/U: HCPCS | Performed by: NURSE PRACTITIONER

## 2020-02-17 PROCEDURE — 99213 OFFICE O/P EST LOW 20 MIN: CPT | Performed by: NURSE PRACTITIONER

## 2020-02-17 PROCEDURE — G8484 FLU IMMUNIZE NO ADMIN: HCPCS | Performed by: NURSE PRACTITIONER

## 2020-02-17 PROCEDURE — 3017F COLORECTAL CA SCREEN DOC REV: CPT | Performed by: NURSE PRACTITIONER

## 2020-02-17 PROCEDURE — G8400 PT W/DXA NO RESULTS DOC: HCPCS | Performed by: NURSE PRACTITIONER

## 2020-02-17 PROCEDURE — 1090F PRES/ABSN URINE INCON ASSESS: CPT | Performed by: NURSE PRACTITIONER

## 2020-02-17 PROCEDURE — 3046F HEMOGLOBIN A1C LEVEL >9.0%: CPT | Performed by: NURSE PRACTITIONER

## 2020-02-17 PROCEDURE — 4040F PNEUMOC VAC/ADMIN/RCVD: CPT | Performed by: NURSE PRACTITIONER

## 2020-02-17 PROCEDURE — 1036F TOBACCO NON-USER: CPT | Performed by: NURSE PRACTITIONER

## 2020-02-17 PROCEDURE — 1123F ACP DISCUSS/DSCN MKR DOCD: CPT | Performed by: NURSE PRACTITIONER

## 2020-02-17 PROCEDURE — G8427 DOCREV CUR MEDS BY ELIG CLIN: HCPCS | Performed by: NURSE PRACTITIONER

## 2020-02-17 RX ORDER — LISINOPRIL 40 MG/1
40 TABLET ORAL DAILY
Qty: 30 TABLET | Refills: 5 | Status: SHIPPED | OUTPATIENT
Start: 2020-02-17 | End: 2020-02-27 | Stop reason: SDUPTHER

## 2020-02-17 ASSESSMENT — ENCOUNTER SYMPTOMS
WHEEZING: 0
SHORTNESS OF BREATH: 0

## 2020-02-17 NOTE — PROGRESS NOTES
Patient: Ilda Church is a 75 y.o. female who presents today with the following Chief Complaint(s):  Chief Complaint   Patient presents with    Medication Refill         HPI   Az Shepherd is a 76 yo female who is here for follow up HTN. Has been taking lisinopril 35mg daily. She denies any headaches, chest pain or SOB. She states it makes her nervous to come to the doctor  DM: she declined meds last year when her A1C was 7.1. She wanted to work on diet- she did not come back until today. She states she has pains in her lower legs off and on. She does not check her blood sugar. She denies urinary frequency or blurry vision. She still declines labs and meds today for DM    Current Outpatient Medications   Medication Sig Dispense Refill    lisinopril (PRINIVIL;ZESTRIL) 40 MG tablet Take 1 tablet by mouth daily 30 tablet 5     No current facility-administered medications for this visit. Patient's past medical history, surgical history, family history, medications,  andallergies  were all reviewed and updated as appropriate today. Review of Systems   Eyes: Negative for visual disturbance. Respiratory: Negative for shortness of breath and wheezing. Cardiovascular: Negative for chest pain and palpitations. Genitourinary: Negative for dysuria, frequency and urgency. Musculoskeletal:        Achy legs   Neurological: Negative for dizziness, numbness and headaches. Physical Exam  Vitals signs and nursing note reviewed. Constitutional:       Appearance: Normal appearance. She is well-developed. HENT:      Head: Normocephalic and atraumatic. Eyes:      Conjunctiva/sclera: Conjunctivae normal.   Neck:      Musculoskeletal: Normal range of motion and neck supple. Cardiovascular:      Rate and Rhythm: Normal rate and regular rhythm. Heart sounds: Normal heart sounds. No murmur. Pulmonary:      Effort: Pulmonary effort is normal. No respiratory distress.       Breath sounds: Normal breath

## 2020-02-19 ENCOUNTER — OFFICE VISIT (OUTPATIENT)
Dept: ORTHOPEDIC SURGERY | Age: 65
End: 2020-02-19
Payer: MEDICARE

## 2020-02-19 VITALS
HEART RATE: 113 BPM | HEIGHT: 66 IN | SYSTOLIC BLOOD PRESSURE: 138 MMHG | WEIGHT: 216.93 LBS | DIASTOLIC BLOOD PRESSURE: 81 MMHG | BODY MASS INDEX: 34.86 KG/M2

## 2020-02-19 PROBLEM — M21.40 FLAT FOOT: Status: ACTIVE | Noted: 2020-02-19

## 2020-02-19 PROBLEM — M19.071 PRIMARY OSTEOARTHRITIS OF RIGHT FOOT: Status: ACTIVE | Noted: 2020-02-19

## 2020-02-19 PROBLEM — R29.898 ANKLE WEAKNESS: Status: ACTIVE | Noted: 2020-02-19

## 2020-02-19 PROBLEM — M79.671 RIGHT FOOT PAIN: Status: ACTIVE | Noted: 2020-02-19

## 2020-02-19 PROCEDURE — G8417 CALC BMI ABV UP PARAM F/U: HCPCS | Performed by: FAMILY MEDICINE

## 2020-02-19 PROCEDURE — 1123F ACP DISCUSS/DSCN MKR DOCD: CPT | Performed by: FAMILY MEDICINE

## 2020-02-19 PROCEDURE — G8484 FLU IMMUNIZE NO ADMIN: HCPCS | Performed by: FAMILY MEDICINE

## 2020-02-19 PROCEDURE — 3017F COLORECTAL CA SCREEN DOC REV: CPT | Performed by: FAMILY MEDICINE

## 2020-02-19 PROCEDURE — 1090F PRES/ABSN URINE INCON ASSESS: CPT | Performed by: FAMILY MEDICINE

## 2020-02-19 PROCEDURE — 1036F TOBACCO NON-USER: CPT | Performed by: FAMILY MEDICINE

## 2020-02-19 PROCEDURE — G8427 DOCREV CUR MEDS BY ELIG CLIN: HCPCS | Performed by: FAMILY MEDICINE

## 2020-02-19 PROCEDURE — 99214 OFFICE O/P EST MOD 30 MIN: CPT | Performed by: FAMILY MEDICINE

## 2020-02-19 PROCEDURE — 4040F PNEUMOC VAC/ADMIN/RCVD: CPT | Performed by: FAMILY MEDICINE

## 2020-02-19 PROCEDURE — G8400 PT W/DXA NO RESULTS DOC: HCPCS | Performed by: FAMILY MEDICINE

## 2020-02-19 NOTE — PROGRESS NOTES
testing.     Skin: There are no rashes, ulcerations or lesions. Distal motor sensory and vascular exam is intact.     Gait: Reasonable gait with overpronation. She does use power step inserts.     Reflex symmetrically preserved       Additional Comments:                 Additional Examinations:     Contralateral Exam: Contralateral left foot exam appears to be reasonably benign from the standpoint of pain although she is a prominent over pronator and has hallux valgus as well. Right Lower Extremity: Examination of the right lower extremity does not show any tenderness, deformity or injury. Range of motion is unremarkable. There is no gross instability. There are no rashes, ulcerations or lesions. Strength and tone are normal.  Left Lower Extremity: Examination of the left lower extremity does not show any tenderness, deformity or injury. Range of motion is unremarkable. There is no gross instability. There are no rashes, ulcerations or lesions. Strength and tone are normal.          Diagnostic Test Findings: No results found. Right foot AP lateral and oblique films were reviewed from after hours on 8/27/2019 And does show a relatively prominent bunion with hallux valgus and hammer toeing. She does appear to exhibit some midfoot degenerative changes with no high-grade tarsometatarsal collapse. Planus foot  noted.         Assessment & Plan:    Encounter Diagnoses   Name Primary?  Primary osteoarthritis of right foot Yes    Pes planus of both feet     Right foot pain     Ankle weakness        No orders of the defined types were placed in this encounter. Treatment Plan:  Treatment options were discussed with Jay Jay Reynoso. We did again review her previous plain films and exam findings. Clinically since being treated with therapy she would rate her improvement at between 95+ %.   She does appear to have some midfoot arthritis and discomfort mildly over the calcaneocuboid junction and posterior tib greater the peroneals. She does believe the therapy has helped her substantially and she is done very well with her custom orthotics getting the several weeks ago. She has not been consistent with any type of medications and may wish to try this with her occasional soreness for the next few weeks. The importance of continuing with her exercise program was discussed. She is also been having some mechanical low back pain and we did instruct her on a core strengthening and stretching program and we do does with doing this in addition to her anti-inflammatories over the next few weeks. She may be experiencing some spinal stenosis symptoms and if this is an ongoing process and not improving, we may have her see Amish Salgado in 4 to 6 weeks. Icing and activity modification was discussed. I think we can see her back separately for her foot as needed but she will contact us with questions or concerns.                 This dictation was performed with a verbal recognition program (DRAGON) and it was checked for errors. It is possible that there are still dictated errors within this office note. If so, please bring any errors to my attention for an addendum. All efforts were made to ensure that this office note is accurate.

## 2020-02-19 NOTE — PATIENT INSTRUCTIONS
DICLOFENAC 2X/DAY FOR THE NEXT 3-4 WEEKS    IF LEG SYMPTOMS PERSIST, MAKE APPOINTMENT WITH LM OROPEZA PA-C WHO IS A PART OF OUR SPINE TEAM.

## 2020-02-27 RX ORDER — LISINOPRIL 40 MG/1
40 TABLET ORAL DAILY
Qty: 90 TABLET | Refills: 3 | Status: SHIPPED | OUTPATIENT
Start: 2020-02-27 | End: 2020-04-30 | Stop reason: SDUPTHER

## 2020-02-27 NOTE — TELEPHONE ENCOUNTER
She can take the diclofenac- sometimes NSAIDS can raise BP but if she is in pain it can too so take the diclofenac.  Still would like to see her in a month

## 2020-02-27 NOTE — TELEPHONE ENCOUNTER
Patient requesting a medication refill. Patient requesting 90 day supply    Medication:lisinopril (PRINIVIL;ZESTRIL) 5 MG tablet  Pharmacy: [address] [phone] - F [phone]  Last office visit:   Next office visit: Visit date not found      Also patient states when she went to her Orthopaedics appointment her blood pressure was 138/81 and the Dr prescribed her anti inflammatory, Diclofenac wants to know if this mediation will affect the Lisinopril. Rufino Tirado  Please call her back at [phone]

## 2020-02-28 ENCOUNTER — PATIENT MESSAGE (OUTPATIENT)
Dept: FAMILY MEDICINE CLINIC | Age: 65
End: 2020-02-28

## 2020-02-28 RX ORDER — LISINOPRIL 5 MG/1
TABLET ORAL
Qty: 30 TABLET | Refills: 0 | Status: SHIPPED | OUTPATIENT
Start: 2020-02-28 | End: 2020-03-05 | Stop reason: SDUPTHER

## 2020-02-28 NOTE — TELEPHONE ENCOUNTER
From: Buffy Reynoso  To: Summer Duncan APRN - CNP  Sent: 2/28/2020 2:16 PM EST  Subject: Prescription Question    Hi! I wanted to ask you about the prescription you gave me when I was in last week. I think I mentioned that I had a 90 day supply of 30 mg. Lisinopril and I wanted to finish that before I start my new prescription of 40 mg. My problem is that I am completely out of the 5 mg. Lisinopril that I take along with the 30 mg. I wanted to know if you could refill my prescription of 5 mg. so I dont waste the 30 mg. that I already have and I could finish that before I start the new prescription. I also had my blood pressure checked at Dr. Adia Eckert office two days after I saw you at [facility] and it was 138/81 which was much better! Please let me know as I am completely out of the 5mg. I also started Diclofenac today. Thank you!  Mayi Rodgers

## 2020-03-05 RX ORDER — LISINOPRIL 5 MG/1
TABLET ORAL
Qty: 60 TABLET | Refills: 0 | Status: SHIPPED | OUTPATIENT
Start: 2020-03-05 | End: 2020-03-05 | Stop reason: SDUPTHER

## 2020-03-05 RX ORDER — LISINOPRIL 5 MG/1
TABLET ORAL
Qty: 90 TABLET | Refills: 0 | Status: SHIPPED | OUTPATIENT
Start: 2020-03-05 | End: 2020-04-30 | Stop reason: ALTCHOICE

## 2020-03-05 RX ORDER — LISINOPRIL 5 MG/1
TABLET ORAL
Qty: 90 TABLET | Refills: 3 | OUTPATIENT
Start: 2020-03-05

## 2020-03-05 NOTE — TELEPHONE ENCOUNTER
Pharmacy calling requesting rx for 90 ds, they received a 60 ds.     Medication:    lisinopril (PRINIVIL;ZESTRIL) 5 MG tablet       Pharmacy:    [place] [id number] [address] [place] [address] -  [phone]

## 2020-03-26 ENCOUNTER — TELEPHONE (OUTPATIENT)
Dept: ORTHOPEDIC SURGERY | Age: 65
End: 2020-03-26

## 2020-03-26 NOTE — TELEPHONE ENCOUNTER
Returned patients call and let her know that per Dr. Bety Proctor if her symptoms persist she should see [facility] / Eliodoro Opitz.   Se will use the diclofenac as needed     Voiced understanding

## 2020-04-30 ENCOUNTER — PATIENT MESSAGE (OUTPATIENT)
Dept: FAMILY MEDICINE CLINIC | Age: 65
End: 2020-04-30

## 2020-04-30 RX ORDER — LISINOPRIL 30 MG/1
30 TABLET ORAL DAILY
Qty: 90 TABLET | Refills: 0 | OUTPATIENT
Start: 2020-04-30

## 2020-04-30 RX ORDER — LISINOPRIL 5 MG/1
TABLET ORAL
Qty: 90 TABLET | Refills: 0 | OUTPATIENT
Start: 2020-04-30

## 2020-04-30 RX ORDER — LISINOPRIL 40 MG/1
40 TABLET ORAL DAILY
Qty: 90 TABLET | Refills: 1 | Status: SHIPPED | OUTPATIENT
Start: 2020-04-30 | End: 2020-10-19

## 2020-04-30 NOTE — TELEPHONE ENCOUNTER
From: Mic Reynoso  To: Hawa Cole, APRN - CNP  Sent: 4/30/2020 4:04 PM EDT  Subject: Sundar Begum. I am in need of a refill for my 5 mg lisinopril and my 30 mg lisinopril. I want to order my prescriptions from [facility] so that they can be mailed directly to me since I do not want to go into [facility] right now. Is it possible to get a 90 day refill on each of these medications? I also wanted to ask you what the best over the counter medication is to take for my sciatica? I took Diclofenac for 2 months but now I am just taking Advil as needed. Is it safe to take Advil every day? Thank you.    Ilya Li

## 2020-04-30 NOTE — TELEPHONE ENCOUNTER
Called the patient about the dosing of her Lisinopril. We have her at taking 40mg of the medication. She said that it was to be increased per her last visit, but when she was at her Orthopedics it was 138/81. So she would like to stay at the 30mg and 5mg. Please advise if you want her to do a VV or if she can stay at the 30mg and th 5mg of Lisinopril.

## 2020-07-21 ENCOUNTER — TELEPHONE (OUTPATIENT)
Dept: FAMILY MEDICINE CLINIC | Age: 65
End: 2020-07-21

## 2020-10-19 RX ORDER — LISINOPRIL 40 MG/1
40 TABLET ORAL DAILY
Qty: 90 TABLET | Refills: 3 | Status: SHIPPED | OUTPATIENT
Start: 2020-10-19 | End: 2021-11-05

## 2020-10-19 NOTE — TELEPHONE ENCOUNTER
Refill Request     Last Seen: 2/17/2020    Last Written: 4/30/2020    Next Appointment:   No future appointments.           Requested Prescriptions     Pending Prescriptions Disp Refills    lisinopril (PRINIVIL;ZESTRIL) 40 MG tablet [Pharmacy Med Name: LISINOPRIL  40MG  TAB] 90 tablet 3     Sig: TAKE 1 TABLET BY MOUTH  DAILY

## 2021-04-27 ENCOUNTER — TELEPHONE (OUTPATIENT)
Dept: ORTHOPEDIC SURGERY | Age: 66
End: 2021-04-27

## 2021-04-27 NOTE — TELEPHONE ENCOUNTER
LEFT VOICEMAIL REGARDING REQUEST FOR VIRTUAL VISIT. PATIENT HAS NOT BEEN SEEN SINCE February 2020. EXPLAINED IN VOICEMAIL THAT IT WOULD BE DIFFICULT TO DO A VIRTUAL VISIT IF THIS IS A NEW ISSUE/INJURY AS DR. BEAULIEU WILL NEED TO EVALUATE HER. ASKED HER TO CALL ME BACK AND GIVE ME SOME MORE INFORMATION. EXPLAINED I WAS HEADING OUT FOR THE DAY AND TO LEAVE ME A VOICEMAIL AND I WOULD RETURN HER CALL FIRST THING IN THE MORNING ABOUT GETTING HER AN APPOINTMENT. [phone] IS THE NUMBER I GAVE PATIENT TO LEAVE ME A VOICEMAIL.

## 2021-04-27 NOTE — TELEPHONE ENCOUNTER
Appointment Request     Patient requesting earlier appointment: Yes  Appointment offered to patient: NO  Patient Contact Number: [phone]  PATIENT WANTS TO KNOW IF THEY COULD HAVE A VV VISIT

## 2021-05-03 ENCOUNTER — OFFICE VISIT (OUTPATIENT)
Dept: ORTHOPEDIC SURGERY | Age: 66
End: 2021-05-03
Payer: MEDICARE

## 2021-05-03 VITALS — BODY MASS INDEX: 33.04 KG/M2 | HEIGHT: 68 IN | WEIGHT: 218 LBS

## 2021-05-03 DIAGNOSIS — M48.062 SPINAL STENOSIS OF LUMBAR REGION WITH NEUROGENIC CLAUDICATION: ICD-10-CM

## 2021-05-03 DIAGNOSIS — M54.16 RIGHT LUMBAR RADICULITIS: ICD-10-CM

## 2021-05-03 DIAGNOSIS — M54.50 LUMBAR SPINE PAIN: Primary | ICD-10-CM

## 2021-05-03 PROCEDURE — 4040F PNEUMOC VAC/ADMIN/RCVD: CPT | Performed by: FAMILY MEDICINE

## 2021-05-03 PROCEDURE — 3017F COLORECTAL CA SCREEN DOC REV: CPT | Performed by: FAMILY MEDICINE

## 2021-05-03 PROCEDURE — 99214 OFFICE O/P EST MOD 30 MIN: CPT | Performed by: FAMILY MEDICINE

## 2021-05-03 PROCEDURE — G8417 CALC BMI ABV UP PARAM F/U: HCPCS | Performed by: FAMILY MEDICINE

## 2021-05-03 PROCEDURE — 1123F ACP DISCUSS/DSCN MKR DOCD: CPT | Performed by: FAMILY MEDICINE

## 2021-05-03 PROCEDURE — 1090F PRES/ABSN URINE INCON ASSESS: CPT | Performed by: FAMILY MEDICINE

## 2021-05-03 PROCEDURE — G8400 PT W/DXA NO RESULTS DOC: HCPCS | Performed by: FAMILY MEDICINE

## 2021-05-03 PROCEDURE — 1036F TOBACCO NON-USER: CPT | Performed by: FAMILY MEDICINE

## 2021-05-03 PROCEDURE — G8427 DOCREV CUR MEDS BY ELIG CLIN: HCPCS | Performed by: FAMILY MEDICINE

## 2021-05-03 PROCEDURE — L0625 LO FLEX L1-BELOW L5 PRE OTS: HCPCS | Performed by: FAMILY MEDICINE

## 2021-05-03 RX ORDER — DIAZEPAM 10 MG/1
TABLET ORAL
Qty: 1 TABLET | Refills: 0 | Status: SHIPPED | OUTPATIENT
Start: 2021-05-03 | End: 2021-05-10

## 2021-05-03 RX ORDER — METHYLPREDNISOLONE 4 MG/1
TABLET ORAL
Qty: 21 KIT | Refills: 0 | Status: SHIPPED | OUTPATIENT
Start: 2021-05-03 | End: 2021-05-10 | Stop reason: ALTCHOICE

## 2021-05-03 RX ORDER — IBUPROFEN 800 MG/1
800 TABLET ORAL EVERY 8 HOURS PRN
Qty: 90 TABLET | Refills: 3 | Status: SHIPPED | OUTPATIENT
Start: 2021-05-03 | End: 2021-12-07

## 2021-05-03 NOTE — PROGRESS NOTES
Chief Complaint  Leg Pain (OPSP LEG WEAKNESS/CALF PAIN)      Initial consultation mild mechanical low back pain with recurrent left leg pain and history of radiculopathy    History of Present Illness:  Kelsey Holley is a 74 y.o. female who is a very pleasant white female type II diabetic with last A1c 7.1 is a very nice patient of Tiesha Du CNP who is being seen today for evaluation of progressive pain mildly to the lower back with achy symptoms and limited ability to walk distances in her legs bilaterally. She states that she has been having episodic achiness to her legs with subjective weakness for about 3 years but states that over the past years become more pronounced that if she walks more than 100 yards she will have to stop secondary to some achiness to her legs. If she rests more than a minute or 2 it does improve to the point where she is able to resume her activities. There is no history of injury or trauma prior to becoming symptomatic although her mother did require a laminectomy at L4-5 secondary to radiculopathy. On questioning her last year she did have an episode of radiculitis which seem to be more right-sided in the L4 distribution but improved with relative rest.  She does not utilize any type of bracing and does deny neurogenic bowel or bladder symptoms. She does have some discomfort with prolonged sitting as well and has had occasional nighttime discomfort. She was seen for foot and ankle symptoms on the right last year which responded fairly well to rehabilitation but she had been having some posterior thigh and calf pain at that point as well. She has not had recent imaging. She seems to do better with Advil as opposed to the diclofenac. She does deny neurogenic bowel or bladder symptoms. Occasional nighttime discomfort noted. She is being seen today for orthopedic and sports consultation with imaging of her spine.      Pain Assessment  Location of Pain: Leg  Location Modifiers: Right  Severity of Pain: 2  Quality of Pain: Aching, Dull(weakness, sore)  Duration of Pain: Persistent  Frequency of Pain: Constant  Aggravating Factors: Bending, Stretching, Straightening  Limiting Behavior: Yes  Relieving Factors: Rest, Other (Comment)  Result of Injury: No  Work-Related Injury: No  Are there other pain locations you wish to document?: No       Medical History  Patient's medications, allergies, past medical, surgical, social and family histories were reviewed and updated as appropriate. Review of Systems  Pertinent items are noted in HPI  Review of systems reviewed from Patient History Form dated on 5/3/2021 and available in the patient's chart under the Media tab. Vital Signs  There were no vitals filed for this visit. General Exam:     Constitutional: Patient is adequately groomed with no evidence of malnutrition  DTRs: Deep tendon reflexes are intact  Mental Status: The patient is oriented to time, place and person. The patient's mood and affect are appropriate. Lymphatic: The lymphatic examination bilaterally reveals all areas to be without enlargement or induration. Vascular: Examination reveals no swelling or calf tenderness. Peripheral pulses are palpable and 2+. Neurological: The patient has good coordination. There is no weakness or sensory deficit. Lumbar/Sacral Spine Examination  Inspection: There is no high-grade disc deformities or substantial scoliosis. Mild kyphosis noted. No palpable swelling or spasm. Palpation: She does have clinical tenderness over the right greater than left lumbar paraspinals and tenderness along lower lumbar facets. She does have some very mild gluteal tenderness right greater than left. Rang of Motion: She can forward flex to about 50. She does have painful extension which produces pain to the posterior thighs right slightly worse than left proximally. Strength:  There is not appear to be focal lower extremity motor deficits. Special Tests: Pain with lumbar extension as noted above. Her straight leg raising does not appear to be overwhelmingly positive today. Screening hip testing relatively benign. Skin: There are no rashes, ulcerations or lesions. Distal motor sensory and vascular exams intact. Gait: Mild antalgia. Reflexes:  Symmetrically preserved but 3 out of 4 at the knees 4-4 at the ankles. Additional Comments:     Additional Examinations:  Right Lower Extremity: Examination of the right lower extremity does not show any tenderness, deformity or injury. Range of motion is unremarkable. There is no gross instability. There are no rashes, ulcerations or lesions. Strength and tone are normal.  Left Lower Extremity: Examination of the left lower extremity does not show any tenderness, deformity or injury. Range of motion is unremarkable. There is no gross instability. There are no rashes, ulcerations or lesions. Strength and tone are normal.      Diagnostic Test Findings: AP and lateral lumbar spine films were obtained today and does show multilevel lumbar degenerative disc changes with endplate spurring proximally with a grade 1 approaching grade 2 spondylolisthesis of L4-5. Assessment: #1. Symptomatic mechanical low back pain with lumbar degenerative disc disease and grade 1-2 L4-5 lumbar spondylolisthesis with likely spinal stenosis and history of right greater than left L5 radiculitis    Impression:  Encounter Diagnoses   Name Primary?     Lumbar spine pain Yes    Spinal stenosis of lumbar region with neurogenic claudication     Right lumbar radiculitis        Office Procedures:  Orders Placed This Encounter   Procedures    XR LUMBAR SPINE (2-3 VIEWS)     Standing Status:   Future     Number of Occurrences:   1     Standing Expiration Date:   5/3/2022    MRI LUMBAR SPINE WO CONTRAST     Standing Status:   Future     Standing Expiration Date:   8/3/2021 warm-and-form belt and sent her for an MRI to evaluate for central and foraminal narrowing. We did place her in a Medrol Dosepak and she will monitor for transient hyperglycemia and we will change her to ibuprofen 800 mg 1 pill 3 times daily. We did give her Valium 10 mg to take 1 hour prior to her MRI as she does have a history of anxiety. I would like for her to start physical therapy as she is going to [facility] on 5/27/2021 to visit her grandchildren for 2 weeks and they are driving. Hopefully we can get her MRI over the next week and I can see her back prior to her leaving for [place]. Icing and activity modification importance of performing her exercise program was discussed. She will contact us in the interim with questions or concerns and potential for spine evaluation was also discussed. This dictation was performed with a verbal recognition program (DRAGON) and it was checked for errors. It is possible that there are still dictated errors within this office note. If so, please bring any errors to my attention for an addendum. All efforts were made to ensure that this office note is accurate.

## 2021-05-04 ENCOUNTER — TELEMEDICINE (OUTPATIENT)
Dept: FAMILY MEDICINE CLINIC | Age: 66
End: 2021-05-04
Payer: MEDICARE

## 2021-05-04 DIAGNOSIS — M48.00 SPINAL STENOSIS, UNSPECIFIED SPINAL REGION: ICD-10-CM

## 2021-05-04 DIAGNOSIS — E11.9 TYPE 2 DIABETES MELLITUS WITHOUT COMPLICATION, WITHOUT LONG-TERM CURRENT USE OF INSULIN (HCC): Primary | ICD-10-CM

## 2021-05-04 PROCEDURE — G8400 PT W/DXA NO RESULTS DOC: HCPCS | Performed by: NURSE PRACTITIONER

## 2021-05-04 PROCEDURE — G8427 DOCREV CUR MEDS BY ELIG CLIN: HCPCS | Performed by: NURSE PRACTITIONER

## 2021-05-04 PROCEDURE — 1090F PRES/ABSN URINE INCON ASSESS: CPT | Performed by: NURSE PRACTITIONER

## 2021-05-04 PROCEDURE — 4040F PNEUMOC VAC/ADMIN/RCVD: CPT | Performed by: NURSE PRACTITIONER

## 2021-05-04 PROCEDURE — 1123F ACP DISCUSS/DSCN MKR DOCD: CPT | Performed by: NURSE PRACTITIONER

## 2021-05-04 PROCEDURE — 3017F COLORECTAL CA SCREEN DOC REV: CPT | Performed by: NURSE PRACTITIONER

## 2021-05-04 PROCEDURE — 3046F HEMOGLOBIN A1C LEVEL >9.0%: CPT | Performed by: NURSE PRACTITIONER

## 2021-05-04 PROCEDURE — 2022F DILAT RTA XM EVC RTNOPTHY: CPT | Performed by: NURSE PRACTITIONER

## 2021-05-04 PROCEDURE — 99213 OFFICE O/P EST LOW 20 MIN: CPT | Performed by: NURSE PRACTITIONER

## 2021-05-04 ASSESSMENT — ENCOUNTER SYMPTOMS
WHEEZING: 0
SHORTNESS OF BREATH: 0
BACK PAIN: 1

## 2021-05-04 NOTE — PROGRESS NOTES
2021    TELEHEALTH EVALUATION -- Audio/Visual (During LTEHF-20 public health emergency)    HPI:    Ino Reynoso (:  1955) has requested an audio/video evaluation for the following concern(s):    Been seeing ortho for back pain and left leg/knee pain. Saw Dr. Susana Edward and was told she has spinal stenosis, DDD. They want her to get an MRI for further evaluation. She states she was given a medrol dose pack but wasn't sure if she should take it because of her diabetes  She is not on meds and does not check her BS. Her last A1C was 7.1 but was in 2018. Review of Systems   Constitutional: Negative for chills and fever. Respiratory: Negative for shortness of breath and wheezing. Cardiovascular: Negative for chest pain and palpitations. Musculoskeletal: Positive for arthralgias, back pain and myalgias. Prior to Visit Medications    Medication Sig Taking? Authorizing Provider   methylPREDNISolone (MEDROL DOSEPACK) 4 MG tablet Take by mouth as directed. Mata Lee MD   ibuprofen (ADVIL;MOTRIN) 800 MG tablet Take 1 tablet by mouth every 8 hours as needed for Pain  Mata Lee MD   diazePAM (VALIUM) 10 MG tablet TAKE 1 TABLET BY MOUTH 1 HOUR PRIOR TO PROCEDURE.   Mata Lee MD   lisinopril (PRINIVIL;ZESTRIL) 40 MG tablet TAKE 1 TABLET BY MOUTH  DAILY  QIAN Francis - CNP       Social History     Tobacco Use    Smoking status: Never Smoker    Smokeless tobacco: Never Used   Substance Use Topics    Alcohol use: Yes     Comment: occasionally    Drug use: No        No Known Allergies,   Past Medical History:   Diagnosis Date    Anxiety     severe    DM2 (diabetes mellitus, type 2) ([facility] [address]     Hypertension     white coat component    Ocular migraine     Spontaneous vaginal delivery         Varicella    ,   Past Surgical History:   Procedure Laterality Date    HYSTERECTOMY, VAGINAL      due to prolapsed uterus (ovaries intact)    INGUINAL HERNIA REPAIR      right   ,   Family History   Problem Relation Age of Onset    High Blood Pressure Mother     Diabetes Mother     High Cholesterol Mother     Dementia Mother     Early Death Father         suicide    Depression Father        PHYSICAL EXAMINATION:  [ INSTRUCTIONS:  \"[x]\" Indicates a positive item  \"[]\" Indicates a negative item  -- DELETE ALL ITEMS NOT EXAMINED]  Vital Signs: (As obtained by patient/caregiver or practitioner observation)    Blood pressure-  Heart rate-    Respiratory rate-    Temperature-  Pulse oximetry-     Constitutional: [x] Appears well-developed and well-nourished [x] No apparent distress      [] Abnormal-   Mental status  [x] Alert and awake  [x] Oriented to person/place/time [x]Able to follow commands      Eyes:  EOM    []  Normal  [] Abnormal-  Sclera  [x]  Normal  [] Abnormal -         Discharge []  None visible  [] Abnormal -    HENT:   [x] Normocephalic, atraumatic. [] Abnormal   [] Mouth/Throat: Mucous membranes are moist.     External Ears [] Normal  [] Abnormal-     Neck: [] No visualized mass     Pulmonary/Chest: [x] Respiratory effort normal.  [x] No visualized signs of difficulty breathing or respiratory distress        [] Abnormal-      Musculoskeletal:   [] Normal gait with no signs of ataxia         [] Normal range of motion of neck        [] Abnormal-       Neurological:        [] No Facial Asymmetry (Cranial nerve 7 motor function) (limited exam to video visit)          [x] No gaze palsy        [] Abnormal-         Skin:        [x] No significant exanthematous lesions or discoloration noted on facial skin         [] Abnormal-            Psychiatric:       [x] Normal Affect [x] No Hallucinations        [] Abnormal-     Other pertinent observable physical exam findings-     ASSESSMENT/PLAN:  1.  Type 2 diabetes mellitus without complication, without long-term current use of insulin ([address]  Advised patient that she wait to take the medrol dose pack until she is seen next week for a physical and some blood work since it had been so long since her last A1C. If A1C is stable can consider medrol dose pack for her back pain    2. Spinal stenosis, unspecified spinal region  Get MRI that ortho ordered and follow up with them      Return for 1 week for physical with labs. Yuly Reynoso, was evaluated through a synchronous (real-time) audio-video encounter. The patient (or guardian if applicable) is aware that this is a billable service. Verbal consent to proceed has been obtained within the past 12 months. The visit was conducted pursuant to the emergency declaration under the [address] [place] [address], [address] [place] [address] authority and the [employer] and [employer] Act. Patient identification was verified, and a caregiver was present when appropriate. The patient was located in a state where the provider was credentialed to provide care. Total time spent on this encounter: Not billed by time    --QIAN Arrieta CNP on 5/4/2021 at 6:57 PM    An electronic signature was used to authenticate this note.

## 2021-05-07 ENCOUNTER — HOSPITAL ENCOUNTER (OUTPATIENT)
Dept: PHYSICAL THERAPY | Age: 66
Setting detail: THERAPIES SERIES
Discharge: HOME | End: 2021-05-07
Payer: MEDICARE

## 2021-05-07 PROCEDURE — 97161 PT EVAL LOW COMPLEX 20 MIN: CPT

## 2021-05-07 PROCEDURE — 97110 THERAPEUTIC EXERCISES: CPT

## 2021-05-07 PROCEDURE — 97140 MANUAL THERAPY 1/> REGIONS: CPT

## 2021-05-07 NOTE — PLAN OF CARE
[facility], [address]  Phone: [phone]  Fax [phone]    Physical Therapy Certification    Dear Referring Practitioner: Dr. Judge Sung,    We had the pleasure of evaluating the following patient for physical therapy services at [address] [place] [address]. A summary of our findings can be found in the initial assessment below. This includes our plan of care. If you have any questions or concerns regarding these findings, please do not hesitate to contact me at the office phone number checked above.   Thank you for the referral.       Physician Signature:_______________________________Date:__________________  By signing above (or electronic signature), therapists plan is approved by physician    Patient: Conrad Feliciano   : 1955   MRN: [id number]  Referring Physician: Referring Practitioner: Dr. Judge Sung      Evaluation Date: 2021      Medical Diagnosis Information:  Diagnosis: M54.5 (ICD-10-CM) - Lumbar spine pain; M48.062 (ICD-10-CM) - Spinal stenosis of lumbar region with neurogenic claudication; M54.16 (ICD-10-CM) - Right lumbar radiculitis   Treatment Diagnosis: M54.5 Lumbar spine pain; M48.062  Spinal stenosis of lumbar region with neurogenic claudication; M54.16 Right lumbar radiculitis                                         Insurance information: PT Insurance Information: [employer]/AARP     Precautions/ Contra-indications: OA, Controlled HTN, DM 2  Latex Allergy:  [x]NO      []YES  Preferred Language for Healthcare:   [x]English       []Other:    C-SSRS Triggered by Intake questionnaire (Past 2 wk assessment):   [x] No, Questionnaire did not trigger screening.   [] Yes, Patient intake triggered further evaluation      [] C-SSRS Screening completed  [] PCP notified via Plan of Care  [] Emergency services notified     SUBJECTIVE: Patient stated complaint: Pt reports low back pain, and some radiculopathy down both legs, mostly down right leg. Does think at times she has pain in lower leg with out it in upper thigh at times. Has been going on for past 3 years or so, comes and goes. Currently in a worse period of dealing with it, started affecting walking. Got worse about a year ago before pandemic started, now that she has vaccine feels safe coming to PT clinic. Does wear shoe inserts    Relevant Medical History: right foot problems - bunion, broken toe 2nd, arthritis, heel spur, left knee - medial sided pain and feels weak     Functional Disability Index/G-Codes:   Oswestry 24% 12/50    Pain Scale: 0-5/10  Easing factors: rest, meds, changing position  Provocative factors: too much walking, extending, prolonged positioning      Type: []Constant   []Intermittent  []Radiating []Localized []other:     Numbness/Tingling: some in R leg very rare in left    Occupation/School: unemployed     Living Status/Prior Level of Function: Independent with ADLs and IADLs, stretching, walking 5 miles a day    OBJECTIVE:     Standing exam ROM/Normal Abnormal Comments   ROM      flexion X     extension  X Slight pain R>L   side bend  X Slight pain R>L equal range   Kemps/quadrant  X P!  R>L   Toe walk (S2) X     Heel walk (L4) X       Strength  Glute Med  R 4/5   L 4/5  Glute max   R 4/5   L 4/5  TA   Poor initiaion  Multifidi Poor initiation        Prone exam ROM/Normal Abnormal Comments   PA/spring X  Slightly hypomobile throughout lumbar spine but no pain provocation   Sacral spring   Pain on L and hypomobile  Pain on R normal mobilitiy             Reflexes/Sensation: NT   []Dermatomes/Myotomes intact    []UE Reflexes     []Normal []Hypo      []Hyper   []LE Reflexes     []Normal []Hypo      []Hyper   []Babinski/Clonus/Hoffmans:    []Other:    Functional Mobility/Transfers: Increased lordosis, bowing of bilateral LE at rest    Posture: anterior pelvic tilt    Bandages/Dressings/Incisions: NA    Gait: (include devices/WB status) trendelenburg     Orthopedic Special Tests: see above                       [x] Patient history, allergies, meds reviewed. Medical chart reviewed. See intake form. Review Of Systems (ROS):  [x]Performed Review of systems (Integumentary, CardioPulmonary, Neurological) by intake and observation. Intake form has been scanned into medical record. Patient has been instructed to contact their primary care physician regarding ROS issues if not already being addressed at this time. Co-morbidities/Complexities (which will affect course of rehabilitation):   []None           Arthritic conditions   []Rheumatoid arthritis (M05.9)  [x]Osteoarthritis (M19.91)   Cardiovascular conditions   [x]Hypertension (I10)  []Hyperlipidemia (E78.5)  []Angina pectoris (I20)  []Atherosclerosis (I70)   Musculoskeletal conditions   []Disc pathology   []Congenital spine pathologies   []Prior surgical intervention  []Osteoporosis (M81.8)  []Osteopenia (M85.8)   Endocrine conditions   []Hypothyroid (E03.9)  []Hyperthyroid Gastrointestinal conditions   []Constipation (R08.94)   Metabolic conditions   []Morbid obesity (E66.01)  [x]Diabetes type 1(E10.65) or 2 (E11.65)   []Neuropathy (G60.9)     Pulmonary conditions   []Asthma (J45)  []Coughing   []COPD (J44.9)   Psychological Disorders  []Anxiety (F41.9)  []Depression (F32.9)   []Other:   []Other:          Barriers to/and or personal factors that will affect rehab potential:              [x]Age  []Sex              []Motivation/Lack of Motivation                        [x]Co-Morbidities              []Cognitive Function, education/learning barriers              []Environmental, home barriers              []profession/work barriers  []past PT/medical experience  []other:  Justification:     Falls Risk Assessment (30 days):   [x] Falls Risk assessed and no intervention required.   [] Falls Risk assessed and Patient requires intervention due to being higher risk   TUG score (>12s at risk): [] Falls education provided, including      ASSESSMENT:   Functional Impairments:     [x]Noted lumbar/proximal hip hypomobility   []Noted lumbosacral and/or generalized hypermobility   [x]Decreased Lumbosacral/hip/LE functional ROM   [x]Decreased core/proximal hip strength and neuromuscular control    [x]Decreased LE functional strength    []Abnormal reflexes/sensation/myotomal/dermatomal deficits  [x]Reduced balance/proprioceptive control    []other:      Functional Activity Limitations (from functional questionnaire and intake)   [x]Reduced ability to tolerate prolonged functional positions   [x]Reduced ability or difficulty with changes of positions or transfers between positions   [x]Reduced ability to maintain good posture and demonstrate good body mechanics with sitting, bending, and lifting   [x]Reduced ability to sleep   [x] Reduced ability or tolerance with driving and/or computer work   [x]Reduced ability to perform lifting, reaching, carrying tasks   [x]Reduced ability to squat   [x]Reduced ability to forward bend   [x]Reduced ability to ambulate prolonged functional periods/distances/surfaces   [x]Reduced ability to ascend/descend stairs   []other:       Participation Restrictions   [x]Reduced participation in self care activities   [x]Reduced participation in home management activities   [x]Reduced participation in work activities   [x]Reduced participation in social activities. [x]Reduced participation in sport/recreation activities. Classification:   [x]Signs/symptoms consistent with Lumbar instability/stabilization subgroup. []Signs/symptoms consistent with Lumbar mobilization/manipulation subgroup, myotomes and dermatomes intact. Meets manipulation criteria.     []Signs/symptoms consistent with Lumbar direction specific/centralization subgroup   []Signs/symptoms consistent with Lumbar traction subgroup     [x]Signs/symptoms consistent with lumbar facet dysfunction   []Signs/symptoms training, ROM, for LE, Glutes and core   [x]  NMR activation and proprioception for glutes , LE and Core   [x]  Manual therapy as indicated for Hip complex, LE and spine to include: Dry Needling/IASTM, STM, PROM, Gr I-IV mobilizations, manipulation. [x]  Modalities as needed that may include: thermal agents, E-stim, Biofeedback, US, iontophoresis as indicated  [x]  Patient education on joint protection, postural re-education, activity modification, progression of HEP. HEP instruction: (see scanned forms)    GOALS:    Patient stated goal: Pain free and normal walking   [] Progressing: [] Met: [] Not Met: [] Adjusted     Therapist goals for Patient:   Short Term Goals: To be achieved in: 2 weeks  1. Independent in HEP and progression per patient tolerance, in order to prevent re-injury. [] Progressing: [] Met: [] Not Met: [] Adjusted   2. Patient will have a decrease in pain to facilitate improvement in movement, function, and ADLs as indicated by Functional Deficits. [] Progressing: [] Met: [] Not Met: [] Adjusted     Long Term Goals: To be achieved in: 12 weeks  1. Disability index score of 12% or less for the oswestry  to assist with reaching prior level of function. [] Progressing: [] Met: [] Not Met: [] Adjusted   2. Patient will demonstrate increased AROM to WNL, good LS mobility, good hip ROM to allow for proper joint functioning as indicated by patients Functional Deficits. [] Progressing: [] Met: [] Not Met: [] Adjusted   3. Patient will demonstrate an increase in Strength to good proximal hip and core activation to allow for proper functional mobility as indicated by patients Functional Deficits. [] Progressing: [] Met: [] Not Met: [] Adjusted   4. Patient will return to walking 2 + miles without increased symptoms or restriction. [] Progressing: [] Met: [] Not Met: [] Adjusted   5.  Patient will demonstrate appropriate mechanics with bending forward to pick grand child up from ground without cue for compensation and glute engagement.   [] Progressing: [] Met: [] Not Met: [] Adjusted         Electronically signed by:  Constantine Cazares, PT DPT [id number]

## 2021-05-07 NOTE — FLOWSHEET NOTE
[facility], [address] [place]  Phone: [phone]  Fax [phone]    Physical Therapy Daily Treatment Note  Date:  2021    Patient Name:  Tabitha Carrasco    :  1955  MRN: [id number]  Restrictions/Precautions:    Physician Information:  Referring Practitioner: Dr. Lauryn Meier  Medical/Treatment Diagnosis Information:  · Diagnosis: M54.5 (ICD-10-CM) - Lumbar spine pain; M48.062 (ICD-10-CM) - Spinal stenosis of lumbar region with neurogenic claudication; M54.16 (ICD-10-CM) - Right lumbar radiculitis  · Treatment Diagnosis: M54.5 Lumbar spine pain; M48.062  Spinal stenosis of lumbar region with neurogenic claudication; M54.16 Right lumbar radiculitis  [x] Conservative / [] Surgical - DOS:  Therapy Diagnosis/Practice Pattern:  Practice Pattern F: Spinal Disorders  Insurance/Certification information:  PT Insurance Information: /AARP  Plan of care signed: [] YES  [] NO  Number of Comorbidities:  []0     []1-2    [x]3+  Date of Patient follow up with Physician:     Is this a Progress Report:     []  Yes  [x]  No        If Yes:  Date Range for reporting period:  Beginning 2021    Ending     Progress report will be due (10 Rx or 30 days whichever is less): 968       Recertification will be due (POC Duration  / 90 days whichever is less): 2021      Latex Allergy:  [x]NO      []YES  Preferred Language for Healthcare:   [x]English       []other:    Visit # Insurance Allowable   1 [facility]  auth []no        []yes:       SUBJECTIVE:  See eval    OBJECTIVE: See eval   Observation:   Palpation:     Test used Initial score Current Score   Pain Summary VAS 0-5    Functional questionnaire Oswestry 24%     ROM Lumbar Flexion Pain free     Lumbar Ext 80% P!  On R     Lumbar SB L/R 95% P! R>L     Lumbar rotation L/R 85% P! R>L    Strength Hip ext 4     ABD 4     TrA       RESTRICTIONS/PRECAUTIONS: Manual Treatments:  PROM / STM / Oscillations-Mobs:  G-I, II, III, IV (PA's, Inf., Post.)  [x] (19775) Provided manual therapy to mobilize proximal hip and LS spine soft tissue/joints for the purpose of modulating pain, promoting relaxation,  increasing ROM, reducing/eliminating soft tissue swelling/inflammation/restriction, improving soft tissue extensibility and allowing for proper ROM for normal function with self care, mobility, lifting and ambulation. Modalities:      [] GR/ESU 15 min    [] GR 15 min  [] ESU     [] CP    [] MHP    [x] declined  Charges:  Timed Code Treatment Minutes: 40   Total Treatment Minutes: 55     [x] EVAL (LOW) 99234 (typically 20 minutes face-to-face)  [] EVAL (MOD) 17619 (typically 30 minutes face-to-face)  [] EVAL (HIGH) 06075 (typically 45 minutes face-to-face)  [] RE-EVAL     [x] FB(05840) x  2   [] IONTO  [] NMR (14502) x     [] VASO  [x] Manual (06458) x 1     [] Other:  [] TA x      [] Mech Traction (86300)  [] ES(attended) (67988)      [] ES (un) (58388):     Goals:Patient stated goal: Pain free and normal walking   [] Progressing: [] Met: [] Not Met: [] Adjusted     Therapist goals for Patient:   Short Term Goals: To be achieved in: 2 weeks  1. Independent in HEP and progression per patient tolerance, in order to prevent re-injury. [] Progressing: [] Met: [] Not Met: [] Adjusted   2. Patient will have a decrease in pain to facilitate improvement in movement, function, and ADLs as indicated by Functional Deficits. [] Progressing: [] Met: [] Not Met: [] Adjusted     Long Term Goals: To be achieved in: 12 weeks  1. Disability index score of 12% or less for the oswestry  to assist with reaching prior level of function. [] Progressing: [] Met: [] Not Met: [] Adjusted   2. Patient will demonstrate increased AROM to WNL, good LS mobility, good hip ROM to allow for proper joint functioning as indicated by patients Functional Deficits.    [] Progressing: [] Met: [] Not Met:

## 2021-05-10 ENCOUNTER — OFFICE VISIT (OUTPATIENT)
Dept: FAMILY MEDICINE CLINIC | Age: 66
End: 2021-05-10
Payer: MEDICARE

## 2021-05-10 VITALS
BODY MASS INDEX: 33.95 KG/M2 | OXYGEN SATURATION: 98 % | WEIGHT: 224 LBS | HEIGHT: 68 IN | SYSTOLIC BLOOD PRESSURE: 148 MMHG | DIASTOLIC BLOOD PRESSURE: 80 MMHG | HEART RATE: 128 BPM

## 2021-05-10 DIAGNOSIS — Z00.00 ROUTINE GENERAL MEDICAL EXAMINATION AT A HEALTH CARE FACILITY: Primary | ICD-10-CM

## 2021-05-10 DIAGNOSIS — Z12.31 ENCOUNTER FOR SCREENING MAMMOGRAM FOR BREAST CANCER: ICD-10-CM

## 2021-05-10 DIAGNOSIS — Z78.0 ASYMPTOMATIC MENOPAUSAL STATE: ICD-10-CM

## 2021-05-10 DIAGNOSIS — I10 ESSENTIAL HYPERTENSION: ICD-10-CM

## 2021-05-10 DIAGNOSIS — Z23 NEED FOR PROPHYLACTIC VACCINATION AGAINST STREPTOCOCCUS PNEUMONIAE (PNEUMOCOCCUS): ICD-10-CM

## 2021-05-10 DIAGNOSIS — Z12.11 SCREENING FOR COLON CANCER: ICD-10-CM

## 2021-05-10 DIAGNOSIS — E11.9 TYPE 2 DIABETES MELLITUS WITHOUT COMPLICATION, WITHOUT LONG-TERM CURRENT USE OF INSULIN (HCC): ICD-10-CM

## 2021-05-10 PROCEDURE — 90670 PCV13 VACCINE IM: CPT | Performed by: NURSE PRACTITIONER

## 2021-05-10 PROCEDURE — G0438 PPPS, INITIAL VISIT: HCPCS | Performed by: NURSE PRACTITIONER

## 2021-05-10 PROCEDURE — 36415 COLL VENOUS BLD VENIPUNCTURE: CPT | Performed by: NURSE PRACTITIONER

## 2021-05-10 PROCEDURE — 4040F PNEUMOC VAC/ADMIN/RCVD: CPT | Performed by: NURSE PRACTITIONER

## 2021-05-10 PROCEDURE — 3017F COLORECTAL CA SCREEN DOC REV: CPT | Performed by: NURSE PRACTITIONER

## 2021-05-10 PROCEDURE — 1123F ACP DISCUSS/DSCN MKR DOCD: CPT | Performed by: NURSE PRACTITIONER

## 2021-05-10 PROCEDURE — G0009 ADMIN PNEUMOCOCCAL VACCINE: HCPCS | Performed by: NURSE PRACTITIONER

## 2021-05-10 PROCEDURE — 3046F HEMOGLOBIN A1C LEVEL >9.0%: CPT | Performed by: NURSE PRACTITIONER

## 2021-05-10 RX ORDER — DIAZEPAM 10 MG/1
10 TABLET ORAL EVERY 6 HOURS PRN
COMMUNITY
End: 2021-12-07

## 2021-05-10 RX ORDER — HYDROCHLOROTHIAZIDE 12.5 MG/1
12.5 CAPSULE, GELATIN COATED ORAL DAILY
Qty: 30 CAPSULE | Refills: 3 | Status: SHIPPED | OUTPATIENT
Start: 2021-05-10 | End: 2021-12-07

## 2021-05-10 ASSESSMENT — PATIENT HEALTH QUESTIONNAIRE - PHQ9
SUM OF ALL RESPONSES TO PHQ QUESTIONS 1-9: 0
SUM OF ALL RESPONSES TO PHQ QUESTIONS 1-9: 0

## 2021-05-10 ASSESSMENT — LIFESTYLE VARIABLES
AUDIT-C TOTAL SCORE: 1
HOW OFTEN DO YOU HAVE SIX OR MORE DRINKS ON ONE OCCASION: 0

## 2021-05-10 NOTE — PROGRESS NOTES
Medicare Annual Wellness Visit  Name: Terrance Fields Date: 5/10/2021   MRN: <[id number]> Sex: Female   Age: 74 y.o. Ethnicity: Non-/Non    : 1955 Race: Power Reynoso is here for Medicare AWV    Screenings for behavioral, psychosocial and functional/safety risks, and cognitive dysfunction are all negative except as indicated below. These results, as well as other patient data from the [address] form, are documented in Flowsheets linked to this Encounter. No Known Allergies      Prior to Visit Medications    Medication Sig Taking? Authorizing Provider   hydroCHLOROthiazide (MICROZIDE) 12.5 MG capsule Take 1 capsule by mouth daily Yes QIAN Arrieta CNP   lisinopril (PRINIVIL;ZESTRIL) 40 MG tablet TAKE 1 TABLET BY MOUTH  DAILY Yes QIAN Arrieta CNP   diazePAM (VALIUM) 10 MG tablet Take 10 mg by mouth every 6 hours as needed for Anxiety.   Historical Provider, MD   ibuprofen (ADVIL;MOTRIN) 800 MG tablet Take 1 tablet by mouth every 8 hours as needed for Pain  Patient not taking: Reported on 5/10/2021  Estephania Porras MD         Past Medical History:   Diagnosis Date    Anxiety     severe    DM2 (diabetes mellitus, type 2) ([facility] [place] [address]     Hypertension     white coat component    Ocular migraine     Spontaneous vaginal delivery         Varicella        Past Surgical History:   Procedure Laterality Date    HYSTERECTOMY, VAGINAL      due to prolapsed uterus (ovaries intact)    INGUINAL HERNIA REPAIR      right         Family History   Problem Relation Age of Onset    High Blood Pressure Mother     Diabetes Mother     High Cholesterol Mother     Dementia Mother     Early Death Father         suicide    Depression Father        CareTeam (Including outside providers/suppliers regularly involved in providing care):   Patient Care Team:  QIAN Arrieta CNP as PCP - General (Nurse Practitioner Family)  QIAN Arrieta CNP as PCP - [address] [facility] Provider    Wt Readings from Last 3 Encounters:   05/10/21 224 lb (101.6 kg)   05/03/21 218 lb (98.9 kg)   02/19/20 216 lb 14.9 oz (98.4 kg)     Vitals:    05/10/21 1507 05/10/21 1533   BP: (!) 162/90 (!) 148/80   Site:  Left Upper Arm   Pulse: 128    SpO2: 98%    Weight: 224 lb (101.6 kg)    Height: 5' 8\" (1.727 m)      Body mass index is 34.06 kg/m². Based upon direct observation of the patient, evaluation of cognition reveals 2/3 words. Patient's complete Health Risk Assessment and screening values have been reviewed and are found in Flowsheets. The following problems were reviewed today and where indicated follow up appointments were made and/or referrals ordered. Positive Risk Factor Screenings with Interventions:            General Health and ACP:  General  In general, how would you say your health is?: Very Good  In the past 7 days, have you experienced any of the following?  New or Increased Pain, New or Increased Fatigue, Loneliness, Social Isolation, Stress or Anger?: None of These  Do you get the social and emotional support that you need?: Yes  Do you have a Living Will?: (!) No  Advance Directives     Power of [facility] Will ACP-Advance Directive ACP-Power of     Not on File Not on File Not on File Not on File      General Health Risk Interventions:  · No Living Will: Patient declines ACP discussion/assistance    Health Habits/Nutrition:  Health Habits/Nutrition  Do you exercise for at least 20 minutes 2-3 times per week?: Yes  Have you lost any weight without trying in the past 3 months?: No  Do you eat only one meal per day?: No  Have you seen the dentist within the past year?: (!) No  Body mass index: (!) 34.06  Health Habits/Nutrition Interventions:  · Dental exam overdue:  patient encouraged to make appointment with his/her dentist    Hearing/Vision:  No exam data present  Hearing/Vision  Do you or your family notice any trouble with your hearing that hasn't been managed with hearing aids?: No  Do you have difficulty driving, watching TV, or doing any of your daily activities because of your eyesight?: No  Have you had an eye exam within the past year?: (!) No  Hearing/Vision Interventions:  · Vision concerns:  patient encouraged to make appointment with his/her eye specialist    Safety:  Safety  Do you have working smoke detectors?: Yes  Have all throw rugs been removed or fastened?: Yes  Do you have non-slip mats or surfaces in all bathtubs/showers?: (!) No  Do all of your stairways have a railing or banister?: Yes  Are your doorways, halls and stairs free of clutter?: Yes  Do you always fasten your seatbelt when you are in a car?: Yes  Safety Interventions:  · Home safety tips provided     Personalized Preventive Plan   Current Health Maintenance Status  Immunization History   Administered Date(s) Administered    Influenza, High Dose (Fluzone 65 yrs and older) 10/05/2018    Influenza, Intradermal, Quadrivalent, Preservative Free 11/21/2017    Tdap (Boostrix, Adacel) 12/22/2016        Health Maintenance   Topic Date Due    Diabetic retinal exam  Never done    Shingles Vaccine (1 of 2) Never done    Colon cancer screen colonoscopy  Never done    DEXA (modify frequency per FRAX score)  Never done    Breast cancer screen  05/15/2017    Pneumococcal 65+ years Vaccine (1 of 1 - PPSV23) Never done   [employer] Visit (AWV)  Never done    A1C test (Diabetic or Prediabetic)  10/05/2019    Diabetic microalbuminuria test  10/05/2019    Lipid screen  10/05/2019    Potassium monitoring  10/05/2019    Creatinine monitoring  10/05/2019    Diabetic foot exam  05/10/2022    DTaP/Tdap/Td vaccine (2 - Td) 12/22/2026    Flu vaccine  Completed    COVID-19 Vaccine  Completed    Hepatitis A vaccine  Aged Out    Hib vaccine  Aged Out    Meningococcal (ACWY) vaccine  Aged Out    Hepatitis C screen  Discontinued     Recommendations for [employer] Due: see orders and patient instructions/AVS.  . Recommended screening schedule for the next 5-10 years is provided to the patient in written form: see Patient Melissa Alegria was seen today for medicare aw. Diagnoses and all orders for this visit:    Type 2 diabetes mellitus without complication, without long-term current use of insulin (HCC)  -     Hemoglobin A1C  -     Microalbumin / Creatinine Urine Ratio  -     Diabetic Foot Exam: normal microfilament bilateral    Asymptomatic menopausal state  -     DEXA Bone Density Axial Skeleton; Future    Encounter for screening mammogram for breast cancer  -     [place] Digital Screen Bilateral [[id number]];  Future    Need for prophylactic vaccination against Streptococcus pneumoniae (pneumococcus)  -     Pneumococcal conjugate vaccine 13-valent PCV13    Routine general medical examination at a health care facility  -     CBC Auto Differential  -     Comprehensive Metabolic Panel  -     Hemoglobin A1C  -     Lipid Panel  -     Microalbumin / Creatinine Urine Ratio  -     Diabetic Foot Exam

## 2021-05-10 NOTE — PATIENT INSTRUCTIONS
Personalized Preventive Plan for Georgina Reynoso - 5/10/2021  Medicare offers a range of preventive health benefits. Some of the tests and screenings are paid in full while other may be subject to a deductible, co-insurance, and/or copay. Some of these benefits include a comprehensive review of your medical history including lifestyle, illnesses that may run in your family, and various assessments and screenings as appropriate. After reviewing your medical record and screening and assessments performed today your provider may have ordered immunizations, labs, imaging, and/or referrals for you. A list of these orders (if applicable) as well as your Preventive Care list are included within your After Visit Summary for your review. Other Preventive Recommendations:    · A preventive eye exam performed by an eye specialist is recommended every 1-2 years to screen for glaucoma; cataracts, macular degeneration, and other eye disorders. · A preventive dental visit is recommended every 6 months. · Try to get at least 150 minutes of exercise per week or 10,000 steps per day on a pedometer . · Order or download the FREE \"Exercise & Physical Activity: Your Everyday Guide\" from [employer]. Call 6-754.102.9971 or search [employer] online. · You need 7329-2977 mg of calcium and 1571-7404 IU of vitamin D per day. It is possible to meet your calcium requirement with diet alone, but a vitamin D supplement is usually necessary to meet this goal.  · When exposed to the sun, use a sunscreen that protects against both UVA and UVB radiation with an SPF of 30 or greater. Reapply every 2 to 3 hours or after sweating, drying off with a towel, or swimming. · Always wear a seat belt when traveling in a car. Always wear a helmet when riding a bicycle or motorcycle.

## 2021-05-11 ENCOUNTER — TELEPHONE (OUTPATIENT)
Dept: FAMILY MEDICINE CLINIC | Age: 66
End: 2021-05-11

## 2021-05-11 ENCOUNTER — HOSPITAL ENCOUNTER (OUTPATIENT)
Dept: PHYSICAL THERAPY | Age: 66
Setting detail: THERAPIES SERIES
Discharge: HOME | End: 2021-05-11
Payer: MEDICARE

## 2021-05-11 LAB
A/G RATIO: 1.8 (ref 1.1–2.2)
ALBUMIN SERPL-MCNC: 4.9 G/DL (ref 3.4–5)
ALP BLD-CCNC: 61 U/L (ref 40–129)
ALT SERPL-CCNC: 21 U/L (ref 10–40)
ANION GAP SERPL CALCULATED.3IONS-SCNC: 15 MMOL/L (ref 3–16)
AST SERPL-CCNC: 22 U/L (ref 15–37)
BASOPHILS ABSOLUTE: 0.1 K/UL (ref 0–0.2)
BASOPHILS RELATIVE PERCENT: 0.8 %
BILIRUB SERPL-MCNC: <0.2 MG/DL (ref 0–1)
BUN BLDV-MCNC: 19 MG/DL (ref 7–20)
CALCIUM SERPL-MCNC: 9.7 MG/DL (ref 8.3–10.6)
CHLORIDE BLD-SCNC: 102 MMOL/L (ref 99–110)
CHOLESTEROL, TOTAL: 193 MG/DL (ref 0–199)
CO2: 21 MMOL/L (ref 21–32)
CREAT SERPL-MCNC: 0.6 MG/DL (ref 0.6–1.2)
CREATININE URINE: 95.1 MG/DL (ref 28–259)
EOSINOPHILS ABSOLUTE: 0.1 K/UL (ref 0–0.6)
EOSINOPHILS RELATIVE PERCENT: 1.4 %
ESTIMATED AVERAGE GLUCOSE: 208.7 MG/DL
GFR AFRICAN AMERICAN: >60
GFR NON-AFRICAN AMERICAN: >60
GLOBULIN: 2.8 G/DL
GLUCOSE BLD-MCNC: 210 MG/DL (ref 70–99)
HBA1C MFR BLD: 8.9 %
HCT VFR BLD CALC: 38.6 % (ref 36–48)
HDLC SERPL-MCNC: 53 MG/DL (ref 40–60)
HEMOGLOBIN: 13.4 G/DL (ref 12–16)
LDL CHOLESTEROL CALCULATED: 119 MG/DL
LYMPHOCYTES ABSOLUTE: 1.9 K/UL (ref 1–5.1)
LYMPHOCYTES RELATIVE PERCENT: 30.4 %
MCH RBC QN AUTO: 30.8 PG (ref 26–34)
MCHC RBC AUTO-ENTMCNC: 34.6 G/DL (ref 31–36)
MCV RBC AUTO: 89 FL (ref 80–100)
MICROALBUMIN UR-MCNC: <1.2 MG/DL
MICROALBUMIN/CREAT UR-RTO: NORMAL MG/G (ref 0–30)
MONOCYTES ABSOLUTE: 0.5 K/UL (ref 0–1.3)
MONOCYTES RELATIVE PERCENT: 7.6 %
NEUTROPHILS ABSOLUTE: 3.7 K/UL (ref 1.7–7.7)
NEUTROPHILS RELATIVE PERCENT: 59.8 %
PDW BLD-RTO: 13.2 % (ref 12.4–15.4)
PLATELET # BLD: 239 K/UL (ref 135–450)
PMV BLD AUTO: 7.2 FL (ref 5–10.5)
POTASSIUM SERPL-SCNC: 4.4 MMOL/L (ref 3.5–5.1)
RBC # BLD: 4.33 M/UL (ref 4–5.2)
SODIUM BLD-SCNC: 138 MMOL/L (ref 136–145)
TOTAL PROTEIN: 7.7 G/DL (ref 6.4–8.2)
TRIGL SERPL-MCNC: 103 MG/DL (ref 0–150)
VLDLC SERPL CALC-MCNC: 21 MG/DL
WBC # BLD: 6.1 K/UL (ref 4–11)

## 2021-05-11 PROCEDURE — 97110 THERAPEUTIC EXERCISES: CPT

## 2021-05-11 PROCEDURE — 97140 MANUAL THERAPY 1/> REGIONS: CPT

## 2021-05-11 NOTE — TELEPHONE ENCOUNTER
----- Message from [employer] Later sent at 5/11/2021 12:15 PM EDT -----  Subject: Message to Provider    QUESTIONS  Information for Provider? pt is requesting for  to call her   please, for her lab resorts. He wont be able from 2-4 pm  ---------------------------------------------------------------------------  --------------  CALL BACK INFO  What is the best way for the office to contact you? OK to leave message on   voicemail  Preferred Call Back Phone Number? [phone]  ---------------------------------------------------------------------------  --------------  SCRIPT ANSWERS  Relationship to Patient?  Self

## 2021-05-11 NOTE — TELEPHONE ENCOUNTER
Pt wants to know if she can try to get her A1C down on her own as oppose to taking the metformin. States it was higher once before and she had gotten it down.

## 2021-05-11 NOTE — FLOWSHEET NOTE
[facility], [address] [place]  Phone: [phone]  Fax [phone]    Physical Therapy Daily Treatment Note  Date:  2021    Patient Name:  Joey Singh    :  1955  MRN: [id number]  Restrictions/Precautions:    Physician Information:  Referring Practitioner: Dr. Chris Liu  Medical/Treatment Diagnosis Information:  · Diagnosis: M54.5 (ICD-10-CM) - Lumbar spine pain; M48.062 (ICD-10-CM) - Spinal stenosis of lumbar region with neurogenic claudication; M54.16 (ICD-10-CM) - Right lumbar radiculitis  · Treatment Diagnosis: M54.5 Lumbar spine pain; M48.062  Spinal stenosis of lumbar region with neurogenic claudication; M54.16 Right lumbar radiculitis  [x] Conservative / [] Surgical - DOS:  Therapy Diagnosis/Practice Pattern:  Practice Pattern F: Spinal Disorders  Insurance/Certification information:  PT Insurance Information: /AARP  Plan of care signed: [] YES  [] NO  Number of Comorbidities:  []0     []1-2    [x]3+  Date of Patient follow up with Physician:     Is this a Progress Report:     []  Yes  [x]  No        If Yes:  Date Range for reporting period:  Beginning 2021    Ending     Progress report will be due (10 Rx or 30 days whichever is less): 4586       Recertification will be due (POC Duration  / 90 days whichever is less): 2021      Latex Allergy:  [x]NO      []YES  Preferred Language for Healthcare:   [x]English       []other:    Visit # Insurance Allowable   2 [facility]  auth []no        []yes:       SUBJECTIVE:  Pt reports slight improvement over weekend. Did try some walking, pain started around 300 steps in lower leg, as she kept going got a little worse higher up leg. OBJECTIVE:    Observation:   Palpation:     Test used Initial score Current Score   Pain Summary VAS 0-5    Functional questionnaire Oswestry 24%     ROM Lumbar Flexion Pain free     Lumbar Ext 80% P!  On R     Lumbar SB L/R 95% P! R>L     Lumbar rotation L/R 85% P! R>L    Strength Hip ext 4     ABD 4     TrA       RESTRICTIONS/PRECAUTIONS: none    Exercises/Interventions:     Therapeutic Ex sets/reps Notes HEP   Prone hip ext knee bent  Prone press up 10 x   10 x     LTR  BKFO  SKTC  Supine nerve glides with DF 15 x  15 x  3 x 10  10 x  X   Bridges  Clamshells 2 x 10  2 x 10  X   Standing hip ext  Standing hip flex 15 x  15 x  X   Mini squats 15 x Cues for posterior shift X                                                         Pt education: imaging and expected age related changes vs pathologic findings, purpose of PT/mobility/core strength/stability      Manual Intervention       Joint mobs grade 1-2 5 min     STM lumbar paraspinals, piriformis/glutes 5 min                                         NMR re-education                                       Therapeutic Exercise and NMR EXR  [x] (13080) Provided verbal/tactile cueing for activities related to strengthening, flexibility, endurance, ROM  for improvements in proximal hip and core control with self care, mobility, lifting and ambulation.  [] (33094) Provided verbal/tactile cueing for activities related to improving balance, coordination, kinesthetic sense, posture, motor skill, proprioception  to assist with core control in self care, mobility, lifting, and ambulation.      Therapeutic Activities:    [] (81549 or 94635) Provided verbal/tactile cueing for activities related to improving balance, coordination, kinesthetic sense, posture, motor skill, proprioception and motor activation to allow for proper function  with self care and ADLs  [] (41395) Provided training and instruction to the patient for proper core and proximal hip recruitment and positioning with ambulation re-education     Home Exercise Program:    [x] (29817) Reviewed/Progressed HEP activities related to strengthening, flexibility, endurance, ROM of core, proximal hip and LE for functional self-care, function. [] Progressing: [] Met: [] Not Met: [] Adjusted   2. Patient will demonstrate increased AROM to WNL, good LS mobility, good hip ROM to allow for proper joint functioning as indicated by patients Functional Deficits. [] Progressing: [] Met: [] Not Met: [] Adjusted   3. Patient will demonstrate an increase in Strength to good proximal hip and core activation to allow for proper functional mobility as indicated by patients Functional Deficits. [] Progressing: [] Met: [] Not Met: [] Adjusted   4. Patient will return to walking 2 + miles without increased symptoms or restriction. [] Progressing: [] Met: [] Not Met: [] Adjusted   5. Patient will demonstrate appropriate mechanics with bending forward to pick grand child up from ground without cue for compensation and glute engagement. [] Progressing: [] Met: [] Not Met: [] Adjusted       Overall Progression Towards Functional goals/ Treatment Progress Update:  [] Patient is progressing as expected towards functional goals listed. [] Progression is slowed due to complexities/Impairments listed. [] Progression has been slowed due to co-morbidities. [x] Plan just implemented, too soon to assess goals progression <30days   [] Goals require adjustment due to lack of progress  [] Patient is not progressing as expected and requires additional follow up with physician  [] Other    Prognosis for POC: [x] Good [] Fair  [] Poor      Patient requires continued skilled intervention: [x] Yes  [] No      ASSESSMENT:  Pt tolerated therex progression well today without any issues. Pt will benefit from continued skilled PT to increase core stability and strength, functional mobility to return to PLOF and walking.     Treatment/Activity Tolerance:  [x] Patient tolerated treatment well [] Patient limited by fatigue  [] Patient limited by pain  [] Patient limited by other medical complications  [] Other:       PLAN: See eval  [x] Continue per plan of care [] Dg Loomis

## 2021-05-13 ENCOUNTER — HOSPITAL ENCOUNTER (OUTPATIENT)
Dept: PHYSICAL THERAPY | Age: 66
Setting detail: THERAPIES SERIES
Discharge: HOME | End: 2021-05-13
Payer: MEDICARE

## 2021-05-13 PROCEDURE — 97140 MANUAL THERAPY 1/> REGIONS: CPT

## 2021-05-13 PROCEDURE — 97110 THERAPEUTIC EXERCISES: CPT

## 2021-05-13 NOTE — FLOWSHEET NOTE
[facility], [address] [place]  Phone: [phone]  Fax [phone]    Physical Therapy Daily Treatment Note  Date:  2021    Patient Name:  Maxim Chaparro    :  1955  MRN: [id number]  Restrictions/Precautions:    Physician Information:  Referring Practitioner: Dr. Jie Alvarez  Medical/Treatment Diagnosis Information:  · Diagnosis: M54.5 (ICD-10-CM) - Lumbar spine pain; M48.062 (ICD-10-CM) - Spinal stenosis of lumbar region with neurogenic claudication; M54.16 (ICD-10-CM) - Right lumbar radiculitis  · Treatment Diagnosis: M54.5 Lumbar spine pain; M48.062  Spinal stenosis of lumbar region with neurogenic claudication; M54.16 Right lumbar radiculitis  [x] Conservative / [] Surgical - DOS:  Therapy Diagnosis/Practice Pattern:  Practice Pattern F: Spinal Disorders  Insurance/Certification information:  PT Insurance Information: /[employer]  Plan of care signed: [] YES  [] NO  Number of Comorbidities:  []0     []1-2    [x]3+  Date of Patient follow up with Physician:     Is this a Progress Report:     []  Yes  [x]  No        If Yes:  Date Range for reporting period:  Beginning 2021    Ending     Progress report will be due (10 Rx or 30 days whichever is less): 6381       Recertification will be due (POC Duration  / 90 days whichever is less): 2021      Latex Allergy:  [x]NO      []YES  Preferred Language for Healthcare:   [x]English       []other:    Visit # Insurance Allowable   3 [facility]  auth []no        []yes:       SUBJECTIVE:  Pt reports back is a little sore after doing a lot of walking yesterday. OBJECTIVE:    Observation:   Palpation:     Test used Initial score Current Score   Pain Summary VAS 0-5    Functional questionnaire Oswestry 24%     ROM Lumbar Flexion Pain free     Lumbar Ext 80% P!  On R     Lumbar SB L/R 95% P! R>L     Lumbar rotation L/R 85% P! R>L    Strength Hip ext 4     ABD 4 TrA       RESTRICTIONS/PRECAUTIONS: none    Exercises/Interventions:     Therapeutic Ex sets/reps Notes HEP   Prone hip ext knee bent  Prone press up 15 x   15 x     LTR  BKFO  SKTC  Supine nerve glides with DF  Supine march 15 x  15 x  3 x 10\"  10 x  2 x 10  X   Bridges  Clamshells  Supine hip ABD iso 2 x 10    2 x 10 pilates ring  X   Standing hip ext  Standing hip flex 15 x  15 x  X   Mini squats 15 x Cues for posterior shift X                                                         Pt education: imaging and expected age related changes vs pathologic findings, purpose of PT/mobility/core strength/stability      Manual Intervention       Joint mobs grade 1-2 5 min     STM lumbar paraspinals, piriformis/glutes 5 min                                         NMR re-education                                       Therapeutic Exercise and NMR EXR  [x] (94315) Provided verbal/tactile cueing for activities related to strengthening, flexibility, endurance, ROM  for improvements in proximal hip and core control with self care, mobility, lifting and ambulation.  [] (97033) Provided verbal/tactile cueing for activities related to improving balance, coordination, kinesthetic sense, posture, motor skill, proprioception  to assist with core control in self care, mobility, lifting, and ambulation.      Therapeutic Activities:    [] (82010 or 46605) Provided verbal/tactile cueing for activities related to improving balance, coordination, kinesthetic sense, posture, motor skill, proprioception and motor activation to allow for proper function  with self care and ADLs  [] (93346) Provided training and instruction to the patient for proper core and proximal hip recruitment and positioning with ambulation re-education     Home Exercise Program:    [x] (55006) Reviewed/Progressed HEP activities related to strengthening, flexibility, endurance, ROM of core, proximal hip and LE for functional self-care, mobility, lifting and ambulation [] (83842) Reviewed/Progressed HEP activities related to improving balance, coordination, kinesthetic sense, posture, motor skill, proprioception of core, proximal hip and LE for self care, mobility, lifting, and ambulation      Manual Treatments:  PROM / STM / Oscillations-Mobs:  G-I, II, III, IV (PA's, Inf., Post.)  [x] (69139) Provided manual therapy to mobilize proximal hip and LS spine soft tissue/joints for the purpose of modulating pain, promoting relaxation,  increasing ROM, reducing/eliminating soft tissue swelling/inflammation/restriction, improving soft tissue extensibility and allowing for proper ROM for normal function with self care, mobility, lifting and ambulation. Modalities:      [] GR/ESU 15 min    [] GR 15 min  [] ESU     [] CP    [] MHP    [] declined  Charges:  Timed Code Treatment Minutes: 40   Total Treatment Minutes: 40     [] EVAL (LOW) 53645 (typically 20 minutes face-to-face)  [] EVAL (MOD) 50525 (typically 30 minutes face-to-face)  [] EVAL (HIGH) 65617 (typically 45 minutes face-to-face)  [] RE-EVAL     [x] GY(65874) x  2   [] IONTO  [] NMR (57319) x     [] VASO  [x] Manual (22889) x 1     [] Other:  [] TA x      [] Mech Traction (60401)  [] ES(attended) (13811)      [] ES (un) (54090):     Goals:Patient stated goal: Pain free and normal walking   [] Progressing: [] Met: [] Not Met: [] Adjusted     Therapist goals for Patient:   Short Term Goals: To be achieved in: 2 weeks  1. Independent in HEP and progression per patient tolerance, in order to prevent re-injury. [] Progressing: [] Met: [] Not Met: [] Adjusted   2. Patient will have a decrease in pain to facilitate improvement in movement, function, and ADLs as indicated by Functional Deficits. [] Progressing: [] Met: [] Not Met: [] Adjusted     Long Term Goals: To be achieved in: 12 weeks  1. Disability index score of 12% or less for the oswestry  to assist with reaching prior level of function.    [] Progressing: [] Met: [] Not Met: [] Adjusted   2. Patient will demonstrate increased AROM to WNL, good LS mobility, good hip ROM to allow for proper joint functioning as indicated by patients Functional Deficits. [] Progressing: [] Met: [] Not Met: [] Adjusted   3. Patient will demonstrate an increase in Strength to good proximal hip and core activation to allow for proper functional mobility as indicated by patients Functional Deficits. [] Progressing: [] Met: [] Not Met: [] Adjusted   4. Patient will return to walking 2 + miles without increased symptoms or restriction. [] Progressing: [] Met: [] Not Met: [] Adjusted   5. Patient will demonstrate appropriate mechanics with bending forward to pick grand child up from ground without cue for compensation and glute engagement. [] Progressing: [] Met: [] Not Met: [] Adjusted       Overall Progression Towards Functional goals/ Treatment Progress Update:  [] Patient is progressing as expected towards functional goals listed. [] Progression is slowed due to complexities/Impairments listed. [] Progression has been slowed due to co-morbidities. [x] Plan just implemented, too soon to assess goals progression <30days   [] Goals require adjustment due to lack of progress  [] Patient is not progressing as expected and requires additional follow up with physician  [] Other    Prognosis for POC: [x] Good [] Fair  [] Poor      Patient requires continued skilled intervention: [x] Yes  [] No      ASSESSMENT:  Pt tolerated therex progression well today without any issues. Pt will benefit from continued skilled PT to increase core stability and strength, functional mobility to return to PLOF and walking.     Treatment/Activity Tolerance:  [x] Patient tolerated treatment well [] Patient limited by fatigue  [] Patient limited by pain  [] Patient limited by other medical complications  [] Other:       PLAN: See eval  [x] Continue per plan of care [] Alter current plan (see comments above)  [] Plan of care initiated [] Hold pending MD visit [] Discharge      Electronically signed by:  Jose Johnson PT , DPT [id number]    Note: If patient does not return for scheduled/ recommended follow up visits, this note will serve as a discharge from care along with most recent update on progress.

## 2021-05-14 ENCOUNTER — TELEPHONE (OUTPATIENT)
Dept: ORTHOPEDIC SURGERY | Age: 66
End: 2021-05-14

## 2021-05-14 NOTE — TELEPHONE ENCOUNTER
SPOKE TO PATIENT, VERIFIED NEW PCP, FAXED LATEST OFFICE NOTES TO HER OFFICE AS SHE HAS AN APPOINTMENT WITH HER ON Monday. TOLD PATIENT TO LET ME KNOW IF NEW PCP IS NOT A [facility], SO WE CAN BE SURE TO FORWARD NOTES TO HER THAT SHE WOULD NOT HAVE ACCESS TO IN [facility]. PATIENT WILL CALL ME BACK IF THAT'S THE CASE.

## 2021-05-18 ENCOUNTER — HOSPITAL ENCOUNTER (OUTPATIENT)
Dept: PHYSICAL THERAPY | Age: 66
Setting detail: THERAPIES SERIES
Discharge: HOME | End: 2021-05-18
Payer: MEDICARE

## 2021-05-18 PROCEDURE — 97110 THERAPEUTIC EXERCISES: CPT

## 2021-05-18 PROCEDURE — 97140 MANUAL THERAPY 1/> REGIONS: CPT

## 2021-05-18 NOTE — FLOWSHEET NOTE
ABD 4     TrA       RESTRICTIONS/PRECAUTIONS: none    Exercises/Interventions:     Therapeutic Ex sets/reps Notes HEP   Prone hip ext knee bent  Prone press up 20 x   20 x     LTR  BKFO  SKTC  Supine nerve glides with DF  Supine march 20 x  20 x  3 x 10\"  10 x  2 x 10  X   Bridges  Marching with band  Clamshells   2 x 10  2 x 10  2 x 10  X   Standing hip ext  Standing hip flex 15 x  15 x  X   Mini squats 15 x Cues for posterior shift X                                                         Pt education: imaging and expected age related changes vs pathologic findings, purpose of PT/mobility/core strength/stability      Manual Intervention       Joint mobs grade 1-2 5 min     STM lumbar paraspinals, piriformis/glutes 5 min                                         NMR re-education                                       Therapeutic Exercise and NMR EXR  [x] (35050) Provided verbal/tactile cueing for activities related to strengthening, flexibility, endurance, ROM  for improvements in proximal hip and core control with self care, mobility, lifting and ambulation.  [] (01384) Provided verbal/tactile cueing for activities related to improving balance, coordination, kinesthetic sense, posture, motor skill, proprioception  to assist with core control in self care, mobility, lifting, and ambulation.      Therapeutic Activities:    [] (50295 or 20650) Provided verbal/tactile cueing for activities related to improving balance, coordination, kinesthetic sense, posture, motor skill, proprioception and motor activation to allow for proper function  with self care and ADLs  [] (52285) Provided training and instruction to the patient for proper core and proximal hip recruitment and positioning with ambulation re-education     Home Exercise Program:    [x] (23825) Reviewed/Progressed HEP activities related to strengthening, flexibility, endurance, ROM of core, proximal hip and LE for functional self-care, mobility, lifting and ambulation   [] (37855) Reviewed/Progressed HEP activities related to improving balance, coordination, kinesthetic sense, posture, motor skill, proprioception of core, proximal hip and LE for self care, mobility, lifting, and ambulation      Manual Treatments:  PROM / STM / Oscillations-Mobs:  G-I, II, III, IV (PA's, Inf., Post.)  [x] (01153) Provided manual therapy to mobilize proximal hip and LS spine soft tissue/joints for the purpose of modulating pain, promoting relaxation,  increasing ROM, reducing/eliminating soft tissue swelling/inflammation/restriction, improving soft tissue extensibility and allowing for proper ROM for normal function with self care, mobility, lifting and ambulation. Modalities:      [] GR/ESU 15 min    [] GR 15 min  [] ESU     [] CP    [] MHP    [] declined  Charges:  Timed Code Treatment Minutes: 40   Total Treatment Minutes: 40     [] EVAL (LOW) 58561 (typically 20 minutes face-to-face)  [] EVAL (MOD) 32536 (typically 30 minutes face-to-face)  [] EVAL (HIGH) 85011 (typically 45 minutes face-to-face)  [] RE-EVAL     [x] OC(85948) x  2   [] IONTO  [] NMR (60606) x     [] VASO  [x] Manual (23236) x 1     [] Other:  [] TA x      [] Mech Traction (66301)  [] ES(attended) (93025)      [] ES (un) (10900):     Goals:Patient stated goal: Pain free and normal walking   [] Progressing: [] Met: [] Not Met: [] Adjusted     Therapist goals for Patient:   Short Term Goals: To be achieved in: 2 weeks  1. Independent in HEP and progression per patient tolerance, in order to prevent re-injury. [] Progressing: [] Met: [] Not Met: [] Adjusted   2. Patient will have a decrease in pain to facilitate improvement in movement, function, and ADLs as indicated by Functional Deficits. [] Progressing: [] Met: [] Not Met: [] Adjusted     Long Term Goals: To be achieved in: 12 weeks  1. Disability index score of 12% or less for the oswestry  to assist with reaching prior level of function.    [] Progressing: [] Met: [] Not Met: [] Adjusted   2. Patient will demonstrate increased AROM to WNL, good LS mobility, good hip ROM to allow for proper joint functioning as indicated by patients Functional Deficits. [] Progressing: [] Met: [] Not Met: [] Adjusted   3. Patient will demonstrate an increase in Strength to good proximal hip and core activation to allow for proper functional mobility as indicated by patients Functional Deficits. [] Progressing: [] Met: [] Not Met: [] Adjusted   4. Patient will return to walking 2 + miles without increased symptoms or restriction. [] Progressing: [] Met: [] Not Met: [] Adjusted   5. Patient will demonstrate appropriate mechanics with bending forward to pick grand child up from ground without cue for compensation and glute engagement. [] Progressing: [] Met: [] Not Met: [] Adjusted       Overall Progression Towards Functional goals/ Treatment Progress Update:  [] Patient is progressing as expected towards functional goals listed. [] Progression is slowed due to complexities/Impairments listed. [] Progression has been slowed due to co-morbidities. [x] Plan just implemented, too soon to assess goals progression <30days   [] Goals require adjustment due to lack of progress  [] Patient is not progressing as expected and requires additional follow up with physician  [] Other    Prognosis for POC: [x] Good [] Fair  [] Poor      Patient requires continued skilled intervention: [x] Yes  [] No      ASSESSMENT:  Pt tolerated therex progression well today without any issues. Pt will benefit from continued skilled PT to increase core stability and strength, functional mobility to return to PLOF and walking.     Treatment/Activity Tolerance:  [x] Patient tolerated treatment well [] Patient limited by fatigue  [] Patient limited by pain  [] Patient limited by other medical complications  [] Other:       PLAN: See eval  [x] Continue per plan of care [] Alter current plan (see comments above)  [] Plan of care initiated [] Hold pending MD visit [] Discharge      Electronically signed by:  Paul Schreiber PT , DPT [id number]    Note: If patient does not return for scheduled/ recommended follow up visits, this note will serve as a discharge from care along with most recent update on progress.

## 2021-05-21 ENCOUNTER — HOSPITAL ENCOUNTER (OUTPATIENT)
Dept: PHYSICAL THERAPY | Age: 66
Setting detail: THERAPIES SERIES
Discharge: HOME | End: 2021-05-21
Payer: MEDICARE

## 2021-05-21 PROCEDURE — 97140 MANUAL THERAPY 1/> REGIONS: CPT

## 2021-05-21 PROCEDURE — 97110 THERAPEUTIC EXERCISES: CPT

## 2021-05-21 NOTE — FLOWSHEET NOTE
[facility], [address]  Phone: [phone]  Fax [phone]    Physical Therapy Daily Treatment Note  Date:  2021    Patient Name:  Alexander Jarquin    :  1955  MRN: [id number]  Restrictions/Precautions:    Physician Information:  Referring Practitioner: Dr. Marino Caicedo  Medical/Treatment Diagnosis Information:  · Diagnosis: M54.5 (ICD-10-CM) - Lumbar spine pain; M48.062 (ICD-10-CM) - Spinal stenosis of lumbar region with neurogenic claudication; M54.16 (ICD-10-CM) - Right lumbar radiculitis  · Treatment Diagnosis: M54.5 Lumbar spine pain; M48.062  Spinal stenosis of lumbar region with neurogenic claudication; M54.16 Right lumbar radiculitis  [x] Conservative / [] Surgical - DOS:  Therapy Diagnosis/Practice Pattern:  Practice Pattern F: Spinal Disorders  Insurance/Certification information:  PT Insurance Information: /AARP  Plan of care signed: [] YES  [] NO  Number of Comorbidities:  []0     []1-2    [x]3+  Date of Patient follow up with Physician:     Is this a Progress Report:     []  Yes  [x]  No        If Yes:  Date Range for reporting period:  Beginning 2021    Ending     Progress report will be due (10 Rx or 30 days whichever is less): 7948       Recertification will be due (POC Duration  / 90 days whichever is less): 2021      Latex Allergy:  [x]NO      []YES  Preferred Language for Healthcare:   [x]English       []other:    Visit # Insurance Allowable   5 [facility]  auth []no        []yes:       SUBJECTIVE:  Pt reports small improvement since last session. Trying to walk more. OBJECTIVE:    Observation:   Palpation:     Test used Initial score Current Score   Pain Summary VAS 0-5    Functional questionnaire Oswestry 24%     ROM Lumbar Flexion Pain free     Lumbar Ext 80% P!  On R     Lumbar SB L/R 95% P! R>L     Lumbar rotation L/R 85% P! R>L    Strength Hip ext 4     ABD 4     TrA RESTRICTIONS/PRECAUTIONS: none    Exercises/Interventions:     Therapeutic Ex sets/reps Notes HEP   Prone hip ext knee bent  Prone press up 20 x   20 x     LTR  BKFO  SKTC  Supine nerve glides with DF  Supine march 20 x  20 x  3 x 10\"  10 x  2 x 10  X   Bridges  Marching with band  Clamshells   2 x 10  2 x 10  2 x 10  X   Standing hip ext OVL  Standing hip flex OVL  LBW OVL 10 x  10 x  2 laps  X   Mini squats 15 x Cues for posterior shift X                                                         Pt education: imaging and expected age related changes vs pathologic findings, purpose of PT/mobility/core strength/stability      Manual Intervention       Joint mobs grade 1-2 5 min     STM lumbar paraspinals, piriformis/glutes 5 min                                         NMR re-education                                       Therapeutic Exercise and NMR EXR  [x] (82123) Provided verbal/tactile cueing for activities related to strengthening, flexibility, endurance, ROM  for improvements in proximal hip and core control with self care, mobility, lifting and ambulation.  [] (63373) Provided verbal/tactile cueing for activities related to improving balance, coordination, kinesthetic sense, posture, motor skill, proprioception  to assist with core control in self care, mobility, lifting, and ambulation.      Therapeutic Activities:    [] (12868 or 62662) Provided verbal/tactile cueing for activities related to improving balance, coordination, kinesthetic sense, posture, motor skill, proprioception and motor activation to allow for proper function  with self care and ADLs  [] (98609) Provided training and instruction to the patient for proper core and proximal hip recruitment and positioning with ambulation re-education     Home Exercise Program:    [x] (86598) Reviewed/Progressed HEP activities related to strengthening, flexibility, endurance, ROM of core, proximal hip and LE for functional self-care, mobility, lifting and ambulation   [] (66150) Reviewed/Progressed HEP activities related to improving balance, coordination, kinesthetic sense, posture, motor skill, proprioception of core, proximal hip and LE for self care, mobility, lifting, and ambulation      Manual Treatments:  PROM / STM / Oscillations-Mobs:  G-I, II, III, IV (PA's, Inf., Post.)  [x] (19998) Provided manual therapy to mobilize proximal hip and LS spine soft tissue/joints for the purpose of modulating pain, promoting relaxation,  increasing ROM, reducing/eliminating soft tissue swelling/inflammation/restriction, improving soft tissue extensibility and allowing for proper ROM for normal function with self care, mobility, lifting and ambulation. Modalities:      [] GR/ESU 15 min    [] GR 15 min  [] ESU     [] CP    [] MHP    [] declined  Charges:  Timed Code Treatment Minutes: 40   Total Treatment Minutes: 40     [] EVAL (LOW) 84304 (typically 20 minutes face-to-face)  [] EVAL (MOD) 89615 (typically 30 minutes face-to-face)  [] EVAL (HIGH) 27548 (typically 45 minutes face-to-face)  [] RE-EVAL     [x] FP(31677) x  2   [] IONTO  [] NMR (16396) x     [] VASO  [x] Manual (99550) x 1     [] Other:  [] TA x      [] Mech Traction (13313)  [] ES(attended) (08228)      [] ES (un) (58402):     Goals:Patient stated goal: Pain free and normal walking   [] Progressing: [] Met: [] Not Met: [] Adjusted     Therapist goals for Patient:   Short Term Goals: To be achieved in: 2 weeks  1. Independent in HEP and progression per patient tolerance, in order to prevent re-injury. [] Progressing: [] Met: [] Not Met: [] Adjusted   2. Patient will have a decrease in pain to facilitate improvement in movement, function, and ADLs as indicated by Functional Deficits. [] Progressing: [] Met: [] Not Met: [] Adjusted     Long Term Goals: To be achieved in: 12 weeks  1. Disability index score of 12% or less for the oswestry  to assist with reaching prior level of function.    [] Progressing: [] Met: [] Not Met: [] Adjusted   2. Patient will demonstrate increased AROM to WNL, good LS mobility, good hip ROM to allow for proper joint functioning as indicated by patients Functional Deficits. [] Progressing: [] Met: [] Not Met: [] Adjusted   3. Patient will demonstrate an increase in Strength to good proximal hip and core activation to allow for proper functional mobility as indicated by patients Functional Deficits. [] Progressing: [] Met: [] Not Met: [] Adjusted   4. Patient will return to walking 2 + miles without increased symptoms or restriction. [] Progressing: [] Met: [] Not Met: [] Adjusted   5. Patient will demonstrate appropriate mechanics with bending forward to pick grand child up from ground without cue for compensation and glute engagement. [] Progressing: [] Met: [] Not Met: [] Adjusted       Overall Progression Towards Functional goals/ Treatment Progress Update:  [] Patient is progressing as expected towards functional goals listed. [] Progression is slowed due to complexities/Impairments listed. [] Progression has been slowed due to co-morbidities. [x] Plan just implemented, too soon to assess goals progression <30days   [] Goals require adjustment due to lack of progress  [] Patient is not progressing as expected and requires additional follow up with physician  [] Other    Prognosis for POC: [x] Good [] Fair  [] Poor      Patient requires continued skilled intervention: [x] Yes  [] No      ASSESSMENT:  Pt tolerated therex progression well today without any issues. Pt will benefit from continued skilled PT to increase core stability and strength, functional mobility to return to PLOF and walking.     Treatment/Activity Tolerance:  [x] Patient tolerated treatment well [] Patient limited by fatigue  [] Patient limited by pain  [] Patient limited by other medical complications  [] Other:       PLAN: See eval  [x] Continue per plan of care [] Alter current plan (see comments above)  [] Plan of care initiated [] Hold pending MD visit [] Discharge      Electronically signed by:  David Stallworth, PT , DPT [id number]    Note: If patient does not return for scheduled/ recommended follow up visits, this note will serve as a discharge from care along with most recent update on progress.

## 2021-05-25 ENCOUNTER — HOSPITAL ENCOUNTER (OUTPATIENT)
Dept: PHYSICAL THERAPY | Age: 66
Setting detail: THERAPIES SERIES
Discharge: HOME | End: 2021-05-25
Payer: MEDICARE

## 2021-05-25 PROCEDURE — 97110 THERAPEUTIC EXERCISES: CPT

## 2021-05-25 PROCEDURE — 97140 MANUAL THERAPY 1/> REGIONS: CPT

## 2021-05-25 NOTE — FLOWSHEET NOTE
[facility], [address] [place]  Phone: [phone]  Fax [phone]    Physical Therapy Daily Treatment Note  Date:  2021    Patient Name:  Hermila Richard    :  1955  MRN: [id number]  Restrictions/Precautions:    Physician Information:  Referring Practitioner: Dr. Emilio Gannon  Medical/Treatment Diagnosis Information:  · Diagnosis: M54.5 (ICD-10-CM) - Lumbar spine pain; M48.062 (ICD-10-CM) - Spinal stenosis of lumbar region with neurogenic claudication; M54.16 (ICD-10-CM) - Right lumbar radiculitis  · Treatment Diagnosis: M54.5 Lumbar spine pain; M48.062  Spinal stenosis of lumbar region with neurogenic claudication; M54.16 Right lumbar radiculitis  [x] Conservative / [] Surgical - DOS:  Therapy Diagnosis/Practice Pattern:  Practice Pattern F: Spinal Disorders  Insurance/Certification information:  PT Insurance Information: /AARP  Plan of care signed: [] YES  [] NO  Number of Comorbidities:  []0     []1-2    [x]3+  Date of Patient follow up with Physician:     Is this a Progress Report:     []  Yes  [x]  No        If Yes:  Date Range for reporting period:  Beginning 2021    Ending     Progress report will be due (10 Rx or 30 days whichever is less): 2/3/0266       Recertification will be due (POC Duration  / 90 days whichever is less): 2021      Latex Allergy:  [x]NO      []YES  Preferred Language for Healthcare:   [x]English       []other:    Visit # Insurance Allowable   6 [facility]  auth []no        []yes:       SUBJECTIVE:  Pt reports continued improvement. Leaves for trip this week, hopeful she will manage long drive. OBJECTIVE:    Observation:   Palpation:     Test used Initial score Current Score   Pain Summary VAS 0-5    Functional questionnaire Oswestry 24% 1250    ROM Lumbar Flexion Pain free     Lumbar Ext 80% P!  On R     Lumbar SB L/R 95% P! R>L     Lumbar rotation L/R 85% P! R>L    Strength Hip ext 4     ABD 4     TrA       RESTRICTIONS/PRECAUTIONS: none    Exercises/Interventions:     Therapeutic Ex sets/reps Notes HEP   Prone hip ext knee bent  Prone press up 20 x   20 x     LTR  BKFO  SKTC  Supine nerve glides with DF 20 x  20 x  3 x 10\"  10 x        X   Bridges  Marching with band  Clamshells   2 x 12  2 x 12  2 x 12 OVL  OVL  OVL X   Standing hip ext OVL  Standing hip flex OVL  LBW OVL 15 x  15 x  5 laps at table  X   Mini squats 20 x Cues for posterior shift X                                                         Pt education: imaging and expected age related changes vs pathologic findings, purpose of PT/mobility/core strength/stability      Manual Intervention       Joint mobs grade 1-2 5 min     STM lumbar paraspinals, piriformis/glutes 5 min                                         NMR re-education                                       Therapeutic Exercise and NMR EXR  [x] (50569) Provided verbal/tactile cueing for activities related to strengthening, flexibility, endurance, ROM  for improvements in proximal hip and core control with self care, mobility, lifting and ambulation.  [] (15011) Provided verbal/tactile cueing for activities related to improving balance, coordination, kinesthetic sense, posture, motor skill, proprioception  to assist with core control in self care, mobility, lifting, and ambulation.      Therapeutic Activities:    [] (84529 or 55818) Provided verbal/tactile cueing for activities related to improving balance, coordination, kinesthetic sense, posture, motor skill, proprioception and motor activation to allow for proper function  with self care and ADLs  [] (72688) Provided training and instruction to the patient for proper core and proximal hip recruitment and positioning with ambulation re-education     Home Exercise Program:    [x] (59213) Reviewed/Progressed HEP activities related to strengthening, flexibility, endurance, ROM of core, proximal hip and LE for functional self-care, mobility, lifting and ambulation   [] (40942) Reviewed/Progressed HEP activities related to improving balance, coordination, kinesthetic sense, posture, motor skill, proprioception of core, proximal hip and LE for self care, mobility, lifting, and ambulation      Manual Treatments:  PROM / STM / Oscillations-Mobs:  G-I, II, III, IV (PA's, Inf., Post.)  [x] (00978) Provided manual therapy to mobilize proximal hip and LS spine soft tissue/joints for the purpose of modulating pain, promoting relaxation,  increasing ROM, reducing/eliminating soft tissue swelling/inflammation/restriction, improving soft tissue extensibility and allowing for proper ROM for normal function with self care, mobility, lifting and ambulation. Modalities:      [] GR/ESU 15 min    [] GR 15 min  [] ESU     [] CP    [] MHP    [] declined  Charges:  Timed Code Treatment Minutes: 40   Total Treatment Minutes: 40     [] EVAL (LOW) 25436 (typically 20 minutes face-to-face)  [] EVAL (MOD) 76562 (typically 30 minutes face-to-face)  [] EVAL (HIGH) 32672 (typically 45 minutes face-to-face)  [] RE-EVAL     [x] PQ(80054) x  2   [] IONTO  [] NMR (19345) x     [] VASO  [x] Manual (68762) x 1     [] Other:  [] TA x      [] Mech Traction (23352)  [] ES(attended) (80000)      [] ES (un) (41922):     Goals:Patient stated goal: Pain free and normal walking   [] Progressing: [] Met: [] Not Met: [] Adjusted     Therapist goals for Patient:   Short Term Goals: To be achieved in: 2 weeks  1. Independent in HEP and progression per patient tolerance, in order to prevent re-injury. [] Progressing: [] Met: [] Not Met: [] Adjusted   2. Patient will have a decrease in pain to facilitate improvement in movement, function, and ADLs as indicated by Functional Deficits. [] Progressing: [] Met: [] Not Met: [] Adjusted     Long Term Goals: To be achieved in: 12 weeks  1.  Disability index score of 12% or less for the oswestry  to assist with reaching prior level of function. [] Progressing: [] Met: [] Not Met: [] Adjusted   2. Patient will demonstrate increased AROM to WNL, good LS mobility, good hip ROM to allow for proper joint functioning as indicated by patients Functional Deficits. [] Progressing: [] Met: [] Not Met: [] Adjusted   3. Patient will demonstrate an increase in Strength to good proximal hip and core activation to allow for proper functional mobility as indicated by patients Functional Deficits. [] Progressing: [] Met: [] Not Met: [] Adjusted   4. Patient will return to walking 2 + miles without increased symptoms or restriction. [] Progressing: [] Met: [] Not Met: [] Adjusted   5. Patient will demonstrate appropriate mechanics with bending forward to pick grand child up from ground without cue for compensation and glute engagement. [] Progressing: [] Met: [] Not Met: [] Adjusted       Overall Progression Towards Functional goals/ Treatment Progress Update:  [] Patient is progressing as expected towards functional goals listed. [] Progression is slowed due to complexities/Impairments listed. [] Progression has been slowed due to co-morbidities. [x] Plan just implemented, too soon to assess goals progression <30days   [] Goals require adjustment due to lack of progress  [] Patient is not progressing as expected and requires additional follow up with physician  [] Other    Prognosis for POC: [x] Good [] Fair  [] Poor      Patient requires continued skilled intervention: [x] Yes  [] No      ASSESSMENT:  Pt tolerated therex progression well today without any issues. Pt will benefit from continued skilled PT to increase core stability and strength, functional mobility to return to PLOF and walking.     Treatment/Activity Tolerance:  [x] Patient tolerated treatment well [] Patient limited by fatigue  [] Patient limited by pain  [] Patient limited by other medical complications  [] Other:       PLAN: See eval  [x] Continue per plan of care [] Alter current plan (see comments above)  [] Plan of care initiated [] Hold pending MD visit [] Discharge      Electronically signed by:  Fabrice Contreras, PT , DPT [id number]    Note: If patient does not return for scheduled/ recommended follow up visits, this note will serve as a discharge from care along with most recent update on progress.

## 2021-06-11 ENCOUNTER — APPOINTMENT (OUTPATIENT)
Dept: PHYSICAL THERAPY | Age: 66
End: 2021-06-11
Payer: MEDICARE

## 2021-06-15 ENCOUNTER — HOSPITAL ENCOUNTER (OUTPATIENT)
Dept: PHYSICAL THERAPY | Age: 66
Setting detail: THERAPIES SERIES
Discharge: HOME | End: 2021-06-15
Payer: MEDICARE

## 2021-06-15 PROCEDURE — 97140 MANUAL THERAPY 1/> REGIONS: CPT

## 2021-06-15 PROCEDURE — 97110 THERAPEUTIC EXERCISES: CPT

## 2021-06-15 NOTE — PROGRESS NOTES
[facility], [address] [place]  Phone: [phone]  Fax [phone]    Physical Therapy Progress Note/Daily Treatment Note  Date:  6/15/2021    Patient Name:  Fatuma Miner    :  1955  MRN: [id number]  Restrictions/Precautions:    Physician Information:  Referring Practitioner: Dr. Jaqui Caballero  Medical/Treatment Diagnosis Information:  · Diagnosis: M54.5 (ICD-10-CM) - Lumbar spine pain; M48.062 (ICD-10-CM) - Spinal stenosis of lumbar region with neurogenic claudication; M54.16 (ICD-10-CM) - Right lumbar radiculitis  · Treatment Diagnosis: M54.5 Lumbar spine pain; M48.062  Spinal stenosis of lumbar region with neurogenic claudication; M54.16 Right lumbar radiculitis  [x] Conservative / [] Surgical - DOS:  Therapy Diagnosis/Practice Pattern:  Practice Pattern F: Spinal Disorders  Insurance/Certification information:  PT Insurance Information: /AARP  Plan of care signed: [] YES  [] NO  Number of Comorbidities:  []0     []1-2    [x]3+  Date of Patient follow up with Physician:     Is this a Progress Report:     [x]  Yes  []  No        If Yes:  Date Range for reporting period:  Beginning 2021    Ending 6/15/2021    Progress report will be due (10 Rx or 30 days whichever is less): 3164      Recertification will be due (POC Duration  / 90 days whichever is less): 2021      Latex Allergy:  [x]NO      []YES  Preferred Language for Healthcare:   [x]English       []other:    Visit # Insurance Allowable   7 [facility]  auth []no        []yes:       SUBJECTIVE:  Pt returns from being in [place] for past 2 weeks. Reports she had an exacerbation of symptoms before she left and trouble after car ride. Since being back feeling pretty good, legs are doing much better not having as much of her NT in legs and her back is feeling better.     OBJECTIVE:    Observation:   Palpation:     Test used Initial score Current Score   Pain Summary VAS 0-5    Functional questionnaire Oswestry 24% 12/50 20% 10/50   ROM Lumbar Flexion Pain free     Lumbar Ext 80% P! On R 100% painfree    Lumbar SB L/R 95% P! R>L Painfree    Lumbar rotation L/R 85% P! R>L Pain free 90%   Strength Hip ext 4 4+    ABD 4 4+    TrA       RESTRICTIONS/PRECAUTIONS: none    Exercises/Interventions:     Therapeutic Ex sets/reps Notes HEP   Prone hip ext knee bent  Prone press up 20 x   20 x     LTR  BKFO  SKTC  Supine nerve glides with DF 20 x  20 x  3 x 10\"  10 x        X   Bridges  Marching with band  Clamshells   2 x 12  2 x 12  2 x 12 OVL  OVL  OVL X   Standing hip ext OVL  Standing hip flex OVL  LBW OVL  X   Mini squats Cues for posterior shift X   CC walkouts FWD/BKWD/lateral 10 x 25#15#15#                                                    Pt education: imaging and expected age related changes vs pathologic findings, purpose of PT/mobility/core strength/stability      Manual Intervention       Joint mobs grade 1-2 5 min     STM lumbar paraspinals, piriformis/glutes 5 min                                         NMR re-education                                       Therapeutic Exercise and NMR EXR  [x] (33123) Provided verbal/tactile cueing for activities related to strengthening, flexibility, endurance, ROM  for improvements in proximal hip and core control with self care, mobility, lifting and ambulation.  [] (17483) Provided verbal/tactile cueing for activities related to improving balance, coordination, kinesthetic sense, posture, motor skill, proprioception  to assist with core control in self care, mobility, lifting, and ambulation.      Therapeutic Activities:    [] (08701 or 11773) Provided verbal/tactile cueing for activities related to improving balance, coordination, kinesthetic sense, posture, motor skill, proprioception and motor activation to allow for proper function  with self care and ADLs  [] (94403) Provided training and instruction to the patient for and walking. Treatment/Activity Tolerance:  [x] Patient tolerated treatment well [] Patient limited by fatigue  [] Patient limited by pain  [] Patient limited by other medical complications  [] Other:       PLAN: See eval  [x] Continue per plan of care [] Alter current plan (see comments above)  [] Plan of care initiated [] Hold pending MD visit [] Discharge      Electronically signed by:  Enrique Narvaez PT , DPT [id number]    Note: If patient does not return for scheduled/ recommended follow up visits, this note will serve as a discharge from care along with most recent update on progress.

## 2021-06-17 ENCOUNTER — HOSPITAL ENCOUNTER (OUTPATIENT)
Dept: PHYSICAL THERAPY | Age: 66
Setting detail: THERAPIES SERIES
Discharge: HOME | End: 2021-06-17
Payer: MEDICARE

## 2021-06-17 PROCEDURE — 97140 MANUAL THERAPY 1/> REGIONS: CPT

## 2021-06-17 PROCEDURE — 97110 THERAPEUTIC EXERCISES: CPT

## 2021-06-17 NOTE — FLOWSHEET NOTE
R>L Painfree    Lumbar rotation L/R 85% P! R>L Pain free 90%   Strength Hip ext 4 4+    ABD 4 4+    TrA       RESTRICTIONS/PRECAUTIONS: none0    Exercises/Interventions:     Therapeutic Ex sets/reps Notes HEP   Prone hip ext knee bent  Prone press up 20 x   20 x     LTR  BKFO  SKTC  Supine nerve glides with DF 20 x  20 x  3 x 10\"  10 x        X   Bridges  Marching with band  Clamshells   2 x 12  2 x 12  2 x 12 OVL  OVL  OVL X   Standing hip ext OVL  Standing hip flex OVL  LBW OVL  X   Mini squats Cues for posterior shift X   CC walkouts FWD/BKWD/lateral 10 x 25#15#15#    Heel raises  2x10     Heel raises R eccentric  x10     Seated Heel raises 2x20 4#                                  Pt education: imaging and expected age related changes vs pathologic findings, purpose of PT/mobility/core strength/stability      Manual Intervention       Joint mobs grade 1-2 5 min     STM lumbar paraspinals, piriformis/glutes 5 min                                         NMR re-education                                       Therapeutic Exercise and NMR EXR  [x] (70682) Provided verbal/tactile cueing for activities related to strengthening, flexibility, endurance, ROM  for improvements in proximal hip and core control with self care, mobility, lifting and ambulation.  [] (01504) Provided verbal/tactile cueing for activities related to improving balance, coordination, kinesthetic sense, posture, motor skill, proprioception  to assist with core control in self care, mobility, lifting, and ambulation.      Therapeutic Activities:    [] (16763 or 23801) Provided verbal/tactile cueing for activities related to improving balance, coordination, kinesthetic sense, posture, motor skill, proprioception and motor activation to allow for proper function  with self care and ADLs  [] (83637) Provided training and instruction to the patient for proper core and proximal hip recruitment and positioning with ambulation re-education     Home Exercise Program:    [x] (19585) Reviewed/Progressed HEP activities related to strengthening, flexibility, endurance, ROM of core, proximal hip and LE for functional self-care, mobility, lifting and ambulation   [] (56854) Reviewed/Progressed HEP activities related to improving balance, coordination, kinesthetic sense, posture, motor skill, proprioception of core, proximal hip and LE for self care, mobility, lifting, and ambulation      Manual Treatments:  PROM / STM / Oscillations-Mobs:  G-I, II, III, IV (PA's, Inf., Post.)  [x] (63150) Provided manual therapy to mobilize proximal hip and LS spine soft tissue/joints for the purpose of modulating pain, promoting relaxation,  increasing ROM, reducing/eliminating soft tissue swelling/inflammation/restriction, improving soft tissue extensibility and allowing for proper ROM for normal function with self care, mobility, lifting and ambulation. Modalities:      [] GR/ESU 15 min    [] GR 15 min  [] ESU     [] CP    [] MHP    [] declined  Charges:  Timed Code Treatment Minutes: 45   Total Treatment Minutes: 45     [] EVAL (LOW) 83527 (typically 20 minutes face-to-face)  [] EVAL (MOD) 33662 (typically 30 minutes face-to-face)  [] EVAL (HIGH) 09995 (typically 45 minutes face-to-face)  [] RE-EVAL     [x] KV(17251) x  2   [] IONTO  [] NMR (61951) x     [] VASO  [x] Manual (93378) x 1     [] Other:  [] TA x      [] Mech Traction (83935)  [] ES(attended) (53574)      [] ES (un) (92424):     Goals:Patient stated goal: Pain free and normal walking   [] Progressing: [x] Met: [] Not Met: [] Adjusted     Therapist goals for Patient:   Short Term Goals: To be achieved in: 2 weeks  1. Independent in HEP and progression per patient tolerance, in order to prevent re-injury. [] Progressing: [x] Met: [] Not Met: [] Adjusted   2. Patient will have a decrease in pain to facilitate improvement in movement, function, and ADLs as indicated by Functional Deficits.   [] Progressing: [x] Met: [] Not limited by fatigue  [] Patient limited by pain  [] Patient limited by other medical complications  [] Other:       PLAN: See eval  [x] Continue per plan of care [] Alter current plan (see comments above)  [] Plan of care initiated [] Hold pending MD visit [] Discharge      Electronically signed by:  Natalia Hunter, PT , DPT [id number]; Umesh Reid, SPT  Therapist was present, directed the patient's care, made skilled judgement, and was responsible for assessment and treatment of the patient. Note: If patient does not return for scheduled/ recommended follow up visits, this note will serve as a discharge from care along with most recent update on progress.

## 2021-06-22 ENCOUNTER — HOSPITAL ENCOUNTER (OUTPATIENT)
Dept: PHYSICAL THERAPY | Age: 66
Setting detail: THERAPIES SERIES
Discharge: HOME | End: 2021-06-22
Payer: MEDICARE

## 2021-06-22 PROCEDURE — 97110 THERAPEUTIC EXERCISES: CPT

## 2021-06-22 PROCEDURE — 97140 MANUAL THERAPY 1/> REGIONS: CPT

## 2021-06-22 NOTE — FLOWSHEET NOTE
[facility], [address] [place]  Phone: [phone]  Fax [phone]    Physical Therapy Progress Note/Daily Treatment Note  Date:  2021    Patient Name:  Joey Singh    :  1955  MRN: [id number]  Restrictions/Precautions:    Physician Information:  Referring Practitioner: Dr. Chris Liu  Medical/Treatment Diagnosis Information:  · Diagnosis: M54.5 (ICD-10-CM) - Lumbar spine pain; M48.062 (ICD-10-CM) - Spinal stenosis of lumbar region with neurogenic claudication; M54.16 (ICD-10-CM) - Right lumbar radiculitis  · Treatment Diagnosis: M54.5 Lumbar spine pain; M48.062  Spinal stenosis of lumbar region with neurogenic claudication; M54.16 Right lumbar radiculitis  [x] Conservative / [] Surgical - DOS:  Therapy Diagnosis/Practice Pattern:  Practice Pattern F: Spinal Disorders  Insurance/Certification information:  PT Insurance Information: [facility]/AARP  Plan of care signed: [] YES  [] NO  Number of Comorbidities:  []0     []1-2    [x]3+  Date of Patient follow up with Physician:     Is this a Progress Report:     [x]  Yes  [x]  No        If Yes:  Date Range for reporting period:  Beginning 2021    Ending 6/15/2021    Progress report will be due (10 Rx or 30 days whichever is less):       Recertification will be due (POC Duration  / 90 days whichever is less): 2021      Latex Allergy:  [x]NO      []YES  Preferred Language for Healthcare:   [x]English       []other:    Visit # Insurance Allowable   9 [facility]  auth []no        []yes:       SUBJECTIVE: States she felt some increased soreness in the upper legs for about two days got better  and felt the \"best she has felt in a while\" on .     OBJECTIVE:    Observation:   Palpation:     Test used Initial score Current Score   Pain Summary VAS 0-5    Functional questionnaire Oswestry 24% 12/50 20% 10/50   ROM Lumbar Flexion Pain free     Lumbar Ext 80% P! On R 100% painfree    Lumbar SB L/R 95% P! R>L Painfree    Lumbar rotation L/R 85% P! R>L Pain free 90%   Strength Hip ext 4 4+    ABD 4 4+    TrA       RESTRICTIONS/PRECAUTIONS: none0    Exercises/Interventions:     Therapeutic Ex sets/reps Notes HEP   Prone hip ext knee bent  Prone press up 20 x   20 x     LTR  BKFO  SKTC  Supine nerve glides with DF 20 x  20 x  3 x 10\"  10 x        X   Bridges  Marching with band  Clamshells   2 x 12  2 x 12  2 x 12 OVL  OVL  OVL X   Standing hip ext OVL  Standing hip flex OVL  LBW OVL  X   Mini squats Cues for posterior shift X   CC walkouts FWD/BKWD/lateral 10 x 25#20#15#    Heel raises  2x10     Heel raises R eccentric     Seated Heel raises x20 8#                                  Pt education: imaging and expected age related changes vs pathologic findings, purpose of PT/mobility/core strength/stability      Manual Intervention       Joint mobs grade 1-2      STM lumbar paraspinals, piriformis/glutes 5 min                                         NMR re-education                                       Therapeutic Exercise and NMR EXR  [x] (18866) Provided verbal/tactile cueing for activities related to strengthening, flexibility, endurance, ROM  for improvements in proximal hip and core control with self care, mobility, lifting and ambulation.  [] (07404) Provided verbal/tactile cueing for activities related to improving balance, coordination, kinesthetic sense, posture, motor skill, proprioception  to assist with core control in self care, mobility, lifting, and ambulation.      Therapeutic Activities:    [] (12642 or 49206) Provided verbal/tactile cueing for activities related to improving balance, coordination, kinesthetic sense, posture, motor skill, proprioception and motor activation to allow for proper function  with self care and ADLs  [] (50700) Provided training and instruction to the patient for proper core and proximal hip recruitment and positioning with ambulation re-education     Home Exercise Program:    [x] (29285) Reviewed/Progressed HEP activities related to strengthening, flexibility, endurance, ROM of core, proximal hip and LE for functional self-care, mobility, lifting and ambulation   [] (61523) Reviewed/Progressed HEP activities related to improving balance, coordination, kinesthetic sense, posture, motor skill, proprioception of core, proximal hip and LE for self care, mobility, lifting, and ambulation      Manual Treatments:  PROM / STM / Oscillations-Mobs:  G-I, II, III, IV (PA's, Inf., Post.)  [x] (89594) Provided manual therapy to mobilize proximal hip and LS spine soft tissue/joints for the purpose of modulating pain, promoting relaxation,  increasing ROM, reducing/eliminating soft tissue swelling/inflammation/restriction, improving soft tissue extensibility and allowing for proper ROM for normal function with self care, mobility, lifting and ambulation. Modalities:      [] GR/ESU 15 min    [] GR 15 min  [] ESU     [] CP    [] MHP    [] declined  Charges:  Timed Code Treatment Minutes: 40   Total Treatment Minutes: 40     [] EVAL (LOW) 93023 (typically 20 minutes face-to-face)  [] EVAL (MOD) 40021 (typically 30 minutes face-to-face)  [] EVAL (HIGH) 64233 (typically 45 minutes face-to-face)  [] RE-EVAL     [x] VJ(91167) x  2   [] IONTO  [] NMR (79914) x     [] VASO  [x] Manual (18531) x 1     [] Other:  [] TA x      [] Mech Traction (01200)  [] ES(attended) (56359)      [] ES (un) (03423):     Goals:Patient stated goal: Pain free and normal walking   [] Progressing: [x] Met: [] Not Met: [] Adjusted     Therapist goals for Patient:   Short Term Goals: To be achieved in: 2 weeks  1. Independent in HEP and progression per patient tolerance, in order to prevent re-injury. [] Progressing: [x] Met: [] Not Met: [] Adjusted   2.  Patient will have a decrease in pain to facilitate improvement in movement, function, and ADLs as indicated by Functional tolerated treatment well [] Patient limited by fatigue  [] Patient limited by pain  [] Patient limited by other medical complications  [] Other:       PLAN: See eval  [x] Continue per plan of care [] Alter current plan (see comments above)  [] Plan of care initiated [] Hold pending MD visit [] Discharge      Electronically signed by:  Tristen Tesfaye, PT , DPT [id number]; Mando Velez, SPT  Therapist was present, directed the patient's care, made skilled judgement, and was responsible for assessment and treatment of the patient. Note: If patient does not return for scheduled/ recommended follow up visits, this note will serve as a discharge from care along with most recent update on progress.

## 2021-06-24 ENCOUNTER — APPOINTMENT (OUTPATIENT)
Dept: PHYSICAL THERAPY | Age: 66
End: 2021-06-24
Payer: MEDICARE

## 2021-06-29 ENCOUNTER — HOSPITAL ENCOUNTER (OUTPATIENT)
Dept: PHYSICAL THERAPY | Age: 66
Setting detail: THERAPIES SERIES
Discharge: HOME | End: 2021-06-29
Payer: MEDICARE

## 2021-06-29 PROCEDURE — 97110 THERAPEUTIC EXERCISES: CPT

## 2021-06-29 PROCEDURE — 97140 MANUAL THERAPY 1/> REGIONS: CPT

## 2021-06-29 PROCEDURE — 97112 NEUROMUSCULAR REEDUCATION: CPT

## 2021-06-29 NOTE — FLOWSHEET NOTE
[facility], [address]  Phone: [phone]  Fax [phone]    Physical Therapy Progress Note/Daily Treatment Note  Date:  2021    Patient Name:  Chandrika Cardoso    :  1955  MRN: [id number]  Restrictions/Precautions:    Physician Information:  Referring Practitioner: Dr. Ruben Allen  Medical/Treatment Diagnosis Information:  · Diagnosis: M54.5 (ICD-10-CM) - Lumbar spine pain; M48.062 (ICD-10-CM) - Spinal stenosis of lumbar region with neurogenic claudication; M54.16 (ICD-10-CM) - Right lumbar radiculitis  · Treatment Diagnosis: M54.5 Lumbar spine pain; M48.062  Spinal stenosis of lumbar region with neurogenic claudication; M54.16 Right lumbar radiculitis  [x] Conservative / [] Surgical - DOS:  Therapy Diagnosis/Practice Pattern:  Practice Pattern F: Spinal Disorders  Insurance/Certification information:  PT Insurance Information: /[employer]  Plan of care signed: [] YES  [] NO  Number of Comorbidities:  []0     []1-2    [x]3+  Date of Patient follow up with Physician:     Is this a Progress Report:     [x]  Yes  [x]  No        If Yes:  Date Range for reporting period:  Beginning 2021    Ending 6/15/2021    Progress report will be due (10 Rx or 30 days whichever is less): 3127      Recertification will be due (POC Duration  / 90 days whichever is less): 2021      Latex Allergy:  [x]NO      []YES  Preferred Language for Healthcare:   [x]English       []other:    Visit # Insurance Allowable   [address]  auth []no        []yes:       SUBJECTIVE: States that she feels her knee is getting stronger. Also stated that she sat for 5 hours during a painting class which ended up causing her some back pain afterwards. States that pain has begun to centralize from her calves to the back of her legs.     OBJECTIVE:    Observation:   Palpation:     Test used Initial score Current Score   Pain Summary VAS 0-5 Functional questionnaire Oswestry 24% 12/50 20% 10/50   ROM Lumbar Flexion Pain free     Lumbar Ext 80% P! On R 100% painfree    Lumbar SB L/R 95% P! R>L Painfree    Lumbar rotation L/R 85% P! R>L Pain free 90%   Strength Hip ext 4 4+    ABD 4 4+    TrA       RESTRICTIONS/PRECAUTIONS: none0    Exercises/Interventions:     Therapeutic Ex sets/reps Notes HEP   Prone hip ext knee bent  Prone press up 20 x   20 x     LTR  BKFO  SKTC  Supine nerve glides with DF 20 x  20 x  3 x 10\"  10 x        X   Bridges  Marching with band  Clamshells   2 x 12  2 x 12   OVL  OVL  OVL X   Standing hip ext OVL  Standing hip flex OVL  LBW OVL  X   Mini squats 20 xCues for posterior shift X   CC walkouts FWD/BKWD/lateral 10 x 25#20#15#    Heel raises  2x10     Heel raises R eccentric     Seated Heel raises x20 8#    Eccentric sit to stand x5     Sit to stand  2 x10     HS curl  2 x 10 40#                Pt education: imaging and expected age related changes vs pathologic findings, purpose of PT/mobility/core strength/stability      Manual Intervention       Joint mobs grade 1-2 5 min     STM lumbar paraspinals, piriformis/glutes, HS 5 min                                         NMR re-education                                       Therapeutic Exercise and NMR EXR  [x] (87569) Provided verbal/tactile cueing for activities related to strengthening, flexibility, endurance, ROM  for improvements in proximal hip and core control with self care, mobility, lifting and ambulation.  [] (91152) Provided verbal/tactile cueing for activities related to improving balance, coordination, kinesthetic sense, posture, motor skill, proprioception  to assist with core control in self care, mobility, lifting, and ambulation.      Therapeutic Activities:    [] (62424 or 28219) Provided verbal/tactile cueing for activities related to improving balance, coordination, kinesthetic sense, posture, motor skill, proprioception and motor activation to allow for proper function  with self care and ADLs  [] (74364) Provided training and instruction to the patient for proper core and proximal hip recruitment and positioning with ambulation re-education     Home Exercise Program:    [x] (35057) Reviewed/Progressed HEP activities related to strengthening, flexibility, endurance, ROM of core, proximal hip and LE for functional self-care, mobility, lifting and ambulation   [] (49748) Reviewed/Progressed HEP activities related to improving balance, coordination, kinesthetic sense, posture, motor skill, proprioception of core, proximal hip and LE for self care, mobility, lifting, and ambulation      Manual Treatments:  PROM / STM / Oscillations-Mobs:  G-I, II, III, IV (PA's, Inf., Post.)  [x] (46739) Provided manual therapy to mobilize proximal hip and LS spine soft tissue/joints for the purpose of modulating pain, promoting relaxation,  increasing ROM, reducing/eliminating soft tissue swelling/inflammation/restriction, improving soft tissue extensibility and allowing for proper ROM for normal function with self care, mobility, lifting and ambulation. Modalities:      [] GR/ESU 15 min    [] GR 15 min  [] ESU     [] CP    [] MHP    [] declined  Charges:  Timed Code Treatment Minutes: 45   Total Treatment Minutes: 45     [] EVAL (LOW) 37787 (typically 20 minutes face-to-face)  [] EVAL (MOD) 50314 (typically 30 minutes face-to-face)  [] EVAL (HIGH) 73647 (typically 45 minutes face-to-face)  [] RE-EVAL     [x] DT(24431) x     [] IONTO  [] NMR (48925) x 1    [] VASO  [x] Manual (61379) x 1     [] Other:  [] TA x      [] Mech Traction (73228)  [] ES(attended) (50610)      [] ES (un) (31313):     Goals:Patient stated goal: Pain free and normal walking   [] Progressing: [x] Met: [] Not Met: [] Adjusted     Therapist goals for Patient:   Short Term Goals: To be achieved in: 2 weeks  1. Independent in HEP and progression per patient tolerance, in order to prevent re-injury.    [] Progressing: [x] Met: [] Not Met: [] Adjusted   2. Patient will have a decrease in pain to facilitate improvement in movement, function, and ADLs as indicated by Functional Deficits. [] Progressing: [x] Met: [] Not Met: [] Adjusted     Long Term Goals: To be achieved in: 12 weeks  1. Disability index score of 12% or less for the oswestry  to assist with reaching prior level of function. [x] Progressing: [] Met: [] Not Met: [] Adjusted   2. Patient will demonstrate increased AROM to WNL, good LS mobility, good hip ROM to allow for proper joint functioning as indicated by patients Functional Deficits. [] Progressing: [x] Met: [] Not Met: [] Adjusted   3. Patient will demonstrate an increase in Strength to good proximal hip and core activation to allow for proper functional mobility as indicated by patients Functional Deficits. [x] Progressing: [] Met: [] Not Met: [] Adjusted   4. Patient will return to walking 2 + miles without increased symptoms or restriction. [] Progressing: [x] Met: [] Not Met: [] Adjusted   5. Patient will demonstrate appropriate mechanics with bending forward to pick grand child up from ground without cue for compensation and glute engagement. [] Progressing: [x] Met: [] Not Met: [] Adjusted       Overall Progression Towards Functional goals/ Treatment Progress Update:  [x] Patient is progressing as expected towards functional goals listed. [] Progression is slowed due to complexities/Impairments listed. [] Progression has been slowed due to co-morbidities. [] Plan just implemented, too soon to assess goals progression <30days   [] Goals require adjustment due to lack of progress  [] Patient is not progressing as expected and requires additional follow up with physician  [] Other    Prognosis for POC: [x] Good [] Fair  [] Poor      Patient requires continued skilled intervention: [x] Yes  [] No      ASSESSMENT:  Pt tolerated therex progression well today without any issues.  Pt will benefit from continued skilled PT to increase core stability and strength, functional mobility to return to PLOF and walking. Treatment/Activity Tolerance:  [x] Patient tolerated treatment well [] Patient limited by fatigue  [] Patient limited by pain  [] Patient limited by other medical complications  [] Other:       PLAN: See eval  [x] Continue per plan of care [] Alter current plan (see comments above)  [] Plan of care initiated [] Hold pending MD visit [] Discharge      Electronically signed by:  Joanne Lux, PT , DPT [id number]; Cate Marie, SPT  Therapist was present, directed the patient's care, made skilled judgement, and was responsible for assessment and treatment of the patient. Note: If patient does not return for scheduled/ recommended follow up visits, this note will serve as a discharge from care along with most recent update on progress.

## 2021-07-01 ENCOUNTER — HOSPITAL ENCOUNTER (OUTPATIENT)
Dept: PHYSICAL THERAPY | Age: 66
Setting detail: THERAPIES SERIES
Discharge: HOME | End: 2021-07-01
Payer: MEDICARE

## 2021-07-01 PROCEDURE — 97110 THERAPEUTIC EXERCISES: CPT

## 2021-07-01 PROCEDURE — 97140 MANUAL THERAPY 1/> REGIONS: CPT

## 2021-07-01 PROCEDURE — 97112 NEUROMUSCULAR REEDUCATION: CPT

## 2021-07-01 NOTE — FLOWSHEET NOTE
[facility], [address] [place]  Phone: [phone]  Fax [phone]    Physical Therapy Progress Note/Daily Treatment Note  Date:  2021    Patient Name:  Salinas Jorgensen    :  1955  MRN: [id number]  Restrictions/Precautions:    Physician Information:  Referring Practitioner: Dr. Hoa Burch  Medical/Treatment Diagnosis Information:  · Diagnosis: M54.5 (ICD-10-CM) - Lumbar spine pain; M48.062 (ICD-10-CM) - Spinal stenosis of lumbar region with neurogenic claudication; M54.16 (ICD-10-CM) - Right lumbar radiculitis  · Treatment Diagnosis: M54.5 Lumbar spine pain; M48.062  Spinal stenosis of lumbar region with neurogenic claudication; M54.16 Right lumbar radiculitis  [x] Conservative / [] Surgical - DOS:  Therapy Diagnosis/Practice Pattern:  Practice Pattern F: Spinal Disorders  Insurance/Certification information:  [facility]  Plan of care signed: [] YES  [] NO  Number of Comorbidities:  []0     []1-2    [x]3+  Date of Patient follow up with Physician:     Is this a Progress Report:     [x]  Yes  [x]  No        If Yes:  Date Range for reporting period:  Beginning 2021    Ending 6/15/2021    Progress report will be due (10 Rx or 30 days whichever is less):       Recertification will be due (POC Duration  / 90 days whichever is less): 2021      Latex Allergy:  [x]NO      []YES  Preferred Language for Healthcare:   [x]English       []other:    Visit # Insurance Allowable   [facility]  auth []no        []yes:       SUBJECTIVE: States that she feels her knee is getting stronger. Also stated that she sat for 5 hours during a painting class which ended up causing her some back pain afterwards. States that pain has begun to centralize from her calves to the back of her legs.     OBJECTIVE:    Observation:   Palpation:     Test used Initial score Current Score   Pain Summary VAS 0-5 self care and ADLs  [] (18656) Provided training and instruction to the patient for proper core and proximal hip recruitment and positioning with ambulation re-education     Home Exercise Program:    [x] (95980) Reviewed/Progressed HEP activities related to strengthening, flexibility, endurance, ROM of core, proximal hip and LE for functional self-care, mobility, lifting and ambulation   [] (73590) Reviewed/Progressed HEP activities related to improving balance, coordination, kinesthetic sense, posture, motor skill, proprioception of core, proximal hip and LE for self care, mobility, lifting, and ambulation      Manual Treatments:  PROM / STM / Oscillations-Mobs:  G-I, II, III, IV (PA's, Inf., Post.)  [x] (33516) Provided manual therapy to mobilize proximal hip and LS spine soft tissue/joints for the purpose of modulating pain, promoting relaxation,  increasing ROM, reducing/eliminating soft tissue swelling/inflammation/restriction, improving soft tissue extensibility and allowing for proper ROM for normal function with self care, mobility, lifting and ambulation. Modalities:      [] GR/ESU 15 min    [] GR 15 min  [] ESU     [] CP    [] MHP    [] declined  Charges:  Timed Code Treatment Minutes: 45   Total Treatment Minutes: 45     [] EVAL (LOW) 71915 (typically 20 minutes face-to-face)  [] EVAL (MOD) 74456 (typically 30 minutes face-to-face)  [] EVAL (HIGH) 89528 (typically 45 minutes face-to-face)  [] RE-EVAL     [x] VU(65848) x 1    [] IONTO  [x] NMR (42833) x 1    [] VASO  [x] Manual (28712) x 1     [] Other:  [] TA x      [] Mech Traction (94998)  [] ES(attended) (31168)      [] ES (un) (12942):     Goals:Patient stated goal: Pain free and normal walking   [] Progressing: [x] Met: [] Not Met: [] Adjusted     Therapist goals for Patient:   Short Term Goals: To be achieved in: 2 weeks  1. Independent in HEP and progression per patient tolerance, in order to prevent re-injury.    [] Progressing: [x] Met: [] Not Met: [] Adjusted   2. Patient will have a decrease in pain to facilitate improvement in movement, function, and ADLs as indicated by Functional Deficits. [] Progressing: [x] Met: [] Not Met: [] Adjusted     Long Term Goals: To be achieved in: 12 weeks  1. Disability index score of 12% or less for the oswestry  to assist with reaching prior level of function. [x] Progressing: [] Met: [] Not Met: [] Adjusted   2. Patient will demonstrate increased AROM to WNL, good LS mobility, good hip ROM to allow for proper joint functioning as indicated by patients Functional Deficits. [] Progressing: [x] Met: [] Not Met: [] Adjusted   3. Patient will demonstrate an increase in Strength to good proximal hip and core activation to allow for proper functional mobility as indicated by patients Functional Deficits. [x] Progressing: [] Met: [] Not Met: [] Adjusted   4. Patient will return to walking 2 + miles without increased symptoms or restriction. [] Progressing: [x] Met: [] Not Met: [] Adjusted   5. Patient will demonstrate appropriate mechanics with bending forward to pick grand child up from ground without cue for compensation and glute engagement. [] Progressing: [x] Met: [] Not Met: [] Adjusted       Overall Progression Towards Functional goals/ Treatment Progress Update:  [x] Patient is progressing as expected towards functional goals listed. [] Progression is slowed due to complexities/Impairments listed. [] Progression has been slowed due to co-morbidities. [] Plan just implemented, too soon to assess goals progression <30days   [] Goals require adjustment due to lack of progress  [] Patient is not progressing as expected and requires additional follow up with physician  [] Other    Prognosis for POC: [x] Good [] Fair  [] Poor      Patient requires continued skilled intervention: [x] Yes  [] No      ASSESSMENT:  Pt tolerated therex progression well today without any issues.  Pt will benefit from continued skilled PT to increase core stability and strength, functional mobility to return to PLOF and walking. Treatment/Activity Tolerance:  [x] Patient tolerated treatment well [] Patient limited by fatigue  [] Patient limited by pain  [] Patient limited by other medical complications  [] Other:       PLAN: See eval  [x] Continue per plan of care [] Alter current plan (see comments above)  [] Plan of care initiated [] Hold pending MD visit [] Discharge      Electronically signed by:  Endy Wayne, PT , DPT [id number]; Prasanth Skaggs, [facility]  Therapist was present, directed the patient's care, made skilled judgement, and was responsible for assessment and treatment of the patient. Note: If patient does not return for scheduled/ recommended follow up visits, this note will serve as a discharge from care along with most recent update on progress.

## 2021-07-07 ENCOUNTER — HOSPITAL ENCOUNTER (OUTPATIENT)
Dept: PHYSICAL THERAPY | Age: 66
Setting detail: THERAPIES SERIES
End: 2021-07-07
Payer: MEDICARE

## 2021-07-09 ENCOUNTER — HOSPITAL ENCOUNTER (OUTPATIENT)
Dept: PHYSICAL THERAPY | Age: 66
Setting detail: THERAPIES SERIES
Discharge: HOME | End: 2021-07-09
Payer: MEDICARE

## 2021-07-13 ENCOUNTER — APPOINTMENT (OUTPATIENT)
Dept: PHYSICAL THERAPY | Age: 66
End: 2021-07-13
Payer: MEDICARE

## 2021-07-14 ENCOUNTER — HOSPITAL ENCOUNTER (OUTPATIENT)
Dept: PHYSICAL THERAPY | Age: 66
Setting detail: THERAPIES SERIES
Discharge: HOME | End: 2021-07-14
Payer: MEDICARE

## 2021-07-14 PROCEDURE — 97110 THERAPEUTIC EXERCISES: CPT

## 2021-07-14 PROCEDURE — 97140 MANUAL THERAPY 1/> REGIONS: CPT

## 2021-07-14 NOTE — FLOWSHEET NOTE
[facility], [address]  Phone: [phone]  Fax [phone]    Physical Therapy Progress Note/Daily Treatment Note  Date:  2021    Patient Name:  King Hernandez    :  1955  MRN: [id number]  Restrictions/Precautions:    Physician Information:  Referring Practitioner: Dr. Nitish Rice  Medical/Treatment Diagnosis Information:  · Diagnosis: M54.5 (ICD-10-CM) - Lumbar spine pain; M48.062 (ICD-10-CM) - Spinal stenosis of lumbar region with neurogenic claudication; M54.16 (ICD-10-CM) - Right lumbar radiculitis  · Treatment Diagnosis: M54.5 Lumbar spine pain; M48.062  Spinal stenosis of lumbar region with neurogenic claudication; M54.16 Right lumbar radiculitis  [x] Conservative / [] Surgical - DOS:  Therapy Diagnosis/Practice Pattern:  Practice Pattern F: Spinal Disorders  Insurance/Certification information:  PT Insurance Information: /[employer]  Plan of care signed: [] YES  [] NO  Number of Comorbidities:  []0     []1-2    [x]3+  Date of Patient follow up with Physician:     Is this a Progress Report:     [x]  Yes  [x]  No        If Yes:  Date Range for reporting period:  Beginning 2021    Ending 6/15/2021    Progress report will be due (10 Rx or 30 days whichever is less): 733      Recertification will be due (POC Duration  / 90 days whichever is less): 2021      Latex Allergy:  [x]NO      []YES  Preferred Language for Healthcare:   [x]English       []other:    Visit # Insurance Allowable   6 [facility]  auth []no        []yes:       SUBJECTIVE: States after last session she went home and did a lot of bush cutting and after that has been having a lot of right sided pain low back/hip area. Tried heat and ice but not much help. Today is first day feeling a little better.     OBJECTIVE:    Observation:   Palpation:     Test used Initial score Current Score   Pain Summary VAS 0-5    Functional questionnaire Oswestry 24% 12/50 20% 10/50   ROM Lumbar Flexion Pain free     Lumbar Ext 80% P! On R 100% painfree    Lumbar SB L/R 95% P! R>L Painfree    Lumbar rotation L/R 85% P! R>L Pain free 90%   Strength Hip ext 4 4+    ABD 4 4+    TrA       RESTRICTIONS/PRECAUTIONS: none0    Exercises/Interventions:     Therapeutic Ex sets/reps Notes HEP   Prone hip ext knee bent  Prone press up 20 x        LTR  BKFO  SKTC  Supine nerve glides with DF  SB DKTC  SB LTR 15 x  15 x  3 x 10\"  10 x  15 x  15 x        X   Bridges  Marching with band  Clamshells   2 x 12  2 x 12   OVL  OVL  OVL X   Standing hip ext OVL  Standing hip flex OVL  LBW OVL  X   Mini squats 20 xCues for posterior shift X   CC walkouts FWD/BKWD/lateral 8 x 25#20#10#    Heel raises  2x10     Heel raises R eccentric     Eccentric sit to stand     Sit to stand      HS curl  40#    [facility] ABD/ext           Pt education:basic nutrition and resistance training, provided information for own research  15 min     Manual Intervention       Joint mobs grade 1-2 5 min     STM lumbar paraspinals, piriformis/glutes, HS 5 min                                         NMR re-education                                       Therapeutic Exercise and NMR EXR  [x] (82906) Provided verbal/tactile cueing for activities related to strengthening, flexibility, endurance, ROM  for improvements in proximal hip and core control with self care, mobility, lifting and ambulation.  [] (97751) Provided verbal/tactile cueing for activities related to improving balance, coordination, kinesthetic sense, posture, motor skill, proprioception  to assist with core control in self care, mobility, lifting, and ambulation.      Therapeutic Activities:    [] (54297 or 95021) Provided verbal/tactile cueing for activities related to improving balance, coordination, kinesthetic sense, posture, motor skill, proprioception and motor activation to allow for proper function  with self care and ADLs  [] (77929) Provided training and instruction to the patient for proper core and proximal hip recruitment and positioning with ambulation re-education     Home Exercise Program:    [x] (00704) Reviewed/Progressed HEP activities related to strengthening, flexibility, endurance, ROM of core, proximal hip and LE for functional self-care, mobility, lifting and ambulation   [] (19448) Reviewed/Progressed HEP activities related to improving balance, coordination, kinesthetic sense, posture, motor skill, proprioception of core, proximal hip and LE for self care, mobility, lifting, and ambulation      Manual Treatments:  PROM / STM / Oscillations-Mobs:  G-I, II, III, IV (PA's, Inf., Post.)  [x] (95039) Provided manual therapy to mobilize proximal hip and LS spine soft tissue/joints for the purpose of modulating pain, promoting relaxation,  increasing ROM, reducing/eliminating soft tissue swelling/inflammation/restriction, improving soft tissue extensibility and allowing for proper ROM for normal function with self care, mobility, lifting and ambulation. Modalities:      [] GR/ESU 15 min    [] GR 15 min  [] ESU     [] CP    [] MHP    [] declined  Charges:  Timed Code Treatment Minutes: 45   Total Treatment Minutes: 45     [] EVAL (LOW) 88889 (typically 20 minutes face-to-face)  [] EVAL (MOD) 71093 (typically 30 minutes face-to-face)  [] EVAL (HIGH) 41788 (typically 45 minutes face-to-face)  [] RE-EVAL     [x] FT(51329) x 2   [] IONTO  [] NMR (43516) x   [] VASO  [x] Manual (02513) x 1     [] Other:  [] TA x      [] Mech Traction (60892)  [] ES(attended) (52314)      [] ES (un) (04635):     Goals:Patient stated goal: Pain free and normal walking   [] Progressing: [x] Met: [] Not Met: [] Adjusted     Therapist goals for Patient:   Short Term Goals: To be achieved in: 2 weeks  1. Independent in HEP and progression per patient tolerance, in order to prevent re-injury. [] Progressing: [x] Met: [] Not Met: [] Adjusted   2.  Patient will have a decrease in pain to facilitate improvement in movement, function, and ADLs as indicated by Functional Deficits. [] Progressing: [x] Met: [] Not Met: [] Adjusted     Long Term Goals: To be achieved in: 12 weeks  1. Disability index score of 12% or less for the oswestry  to assist with reaching prior level of function. [x] Progressing: [] Met: [] Not Met: [] Adjusted   2. Patient will demonstrate increased AROM to WNL, good LS mobility, good hip ROM to allow for proper joint functioning as indicated by patients Functional Deficits. [] Progressing: [x] Met: [] Not Met: [] Adjusted   3. Patient will demonstrate an increase in Strength to good proximal hip and core activation to allow for proper functional mobility as indicated by patients Functional Deficits. [x] Progressing: [] Met: [] Not Met: [] Adjusted   4. Patient will return to walking 2 + miles without increased symptoms or restriction. [] Progressing: [x] Met: [] Not Met: [] Adjusted   5. Patient will demonstrate appropriate mechanics with bending forward to pick grand child up from ground without cue for compensation and glute engagement. [] Progressing: [x] Met: [] Not Met: [] Adjusted       Overall Progression Towards Functional goals/ Treatment Progress Update:  [x] Patient is progressing as expected towards functional goals listed. [] Progression is slowed due to complexities/Impairments listed. [] Progression has been slowed due to co-morbidities. [] Plan just implemented, too soon to assess goals progression <30days   [] Goals require adjustment due to lack of progress  [] Patient is not progressing as expected and requires additional follow up with physician  [] Other    Prognosis for POC: [x] Good [] Fair  [] Poor      Patient requires continued skilled intervention: [x] Yes  [] No      ASSESSMENT:  Pt tolerated therex progression well today without any issues.  Lengthy discussion on healthy lifestyle modifications - additional resources

## 2021-07-15 ENCOUNTER — APPOINTMENT (OUTPATIENT)
Dept: PHYSICAL THERAPY | Age: 66
End: 2021-07-15
Payer: MEDICARE

## 2021-07-20 ENCOUNTER — HOSPITAL ENCOUNTER (OUTPATIENT)
Dept: PHYSICAL THERAPY | Age: 66
Setting detail: THERAPIES SERIES
Discharge: HOME | End: 2021-07-20
Payer: MEDICARE

## 2021-07-20 NOTE — FLOWSHEET NOTE
[facility],  [address]        Physical Therapy  Cancellation/No-show Note  Patient Name:  Azael uHi  :  1955   Date:  2021  Cancelled visits to date: 2  No-shows to date: 0    For today's appointment patient:  [x]  Cancelled  []  Rescheduled appointment  []  No-show     Reason given by patient:  []  Patient ill  []  Conflicting appointment  []  No transportation    []  Conflict with work  []  No reason given  []  Other:     Comments:  Having back spasms    Electronically signed by:  Andi Irving, PT

## 2021-07-23 ENCOUNTER — HOSPITAL ENCOUNTER (OUTPATIENT)
Dept: PHYSICAL THERAPY | Age: 66
Setting detail: THERAPIES SERIES
Discharge: HOME | End: 2021-07-23
Payer: MEDICARE

## 2021-07-23 PROCEDURE — 97110 THERAPEUTIC EXERCISES: CPT

## 2021-07-23 PROCEDURE — 97140 MANUAL THERAPY 1/> REGIONS: CPT

## 2021-07-23 NOTE — PLAN OF CARE
[facility],  [address] [place]  Phone: [phone]  Fax [phone]    Physical Therapy Progress Note/Daily Treatment Note  Date:  2021    Patient Name:  Bertha Rodney    :  1955  MRN: [id number]  Restrictions/Precautions:    Physician Information:  Referring Practitioner: Dr. Viridiana Castillo  Medical/Treatment Diagnosis Information:  · Diagnosis: M54.5 (ICD-10-CM) - Lumbar spine pain; M48.062 (ICD-10-CM) - Spinal stenosis of lumbar region with neurogenic claudication; M54.16 (ICD-10-CM) - Right lumbar radiculitis  · Treatment Diagnosis: M54.5 Lumbar spine pain; M48.062  Spinal stenosis of lumbar region with neurogenic claudication; M54.16 Right lumbar radiculitis  [x] Conservative / [] Surgical - DOS:  Therapy Diagnosis/Practice Pattern:  Practice Pattern F: Spinal Disorders  Insurance/Certification information:  PT Insurance Information: /AARP  Plan of care signed: [] YES  [] NO  Number of Comorbidities:  []0     []1-2    [x]3+  Date of Patient follow up with Physician:     Is this a Progress Report:     [x]  Yes  []  No        If Yes:  Date Range for reporting period:  Beginning 6/15/2021    Ending 2021    Progress report will be due (10 Rx or 30 days whichever is less): 1918      Recertification will be due (POC Duration  / 90 days whichever is less): 2021      Latex Allergy:  [x]NO      []YES  Preferred Language for Healthcare:   [x]English       []other:    Visit # Insurance Allowable   12 [facility]  auth []no        []yes:       SUBJECTIVE: Pt reports had five days worth of really bad spasms in upper back. Low back and hip have been doing pretty good. OBJECTIVE:    Observation:   Palpation:     Test used Initial score Current Score   Pain Summary VAS 0-5    Functional questionnaire Oswestry 24% 12/50 20% 10/50   ROM Lumbar Flexion Pain free     Lumbar Ext 80% P!  On R 100% painfree    Lumbar SB L/R 95% P! R>L Painfree    Lumbar rotation L/R 85% P! R>L Pain free 90%   Strength Hip ext 4 4+    ABD 4 4+    TrA       RESTRICTIONS/PRECAUTIONS: none0    Exercises/Interventions:     Therapeutic Ex sets/reps Notes HEP   Prone hip ext knee bent  Prone press up 20 x        LTR  BKFO  SKTC  Supine nerve glides with DF  SB DKTC  SB LTR   15 x  15 x        X   Bridges SB  Marching with band  Clamshells 2 x 12   X   Standing hip ext OVL  Standing hip flex OVL  LBW  15 x  15 x5 laps at table X   Mini squats 30 xCues for posterior shift X   CC walkouts FWD/BKWD/lateral 8 x 25#20#10#    Heel raises  30x     Sit to stand  2 x10          Manual Intervention       Joint mobs grade 1-2 5 min     STM lumbar paraspinals, piriformis/glutes, HS 5 min                                         NMR re-education                                       Therapeutic Exercise and NMR EXR  [x] (40732) Provided verbal/tactile cueing for activities related to strengthening, flexibility, endurance, ROM  for improvements in proximal hip and core control with self care, mobility, lifting and ambulation.  [] (52158) Provided verbal/tactile cueing for activities related to improving balance, coordination, kinesthetic sense, posture, motor skill, proprioception  to assist with core control in self care, mobility, lifting, and ambulation.      Therapeutic Activities:    [] (71056 or 90052) Provided verbal/tactile cueing for activities related to improving balance, coordination, kinesthetic sense, posture, motor skill, proprioception and motor activation to allow for proper function  with self care and ADLs  [] (77018) Provided training and instruction to the patient for proper core and proximal hip recruitment and positioning with ambulation re-education     Home Exercise Program:    [x] (35404) Reviewed/Progressed HEP activities related to strengthening, flexibility, endurance, ROM of core, proximal hip and LE for functional self-care, mobility, lifting and ambulation   [] (57051) Reviewed/Progressed HEP activities related to improving balance, coordination, kinesthetic sense, posture, motor skill, proprioception of core, proximal hip and LE for self care, mobility, lifting, and ambulation      Manual Treatments:  PROM / STM / Oscillations-Mobs:  G-I, II, III, IV (PA's, Inf., Post.)  [x] (35102) Provided manual therapy to mobilize proximal hip and LS spine soft tissue/joints for the purpose of modulating pain, promoting relaxation,  increasing ROM, reducing/eliminating soft tissue swelling/inflammation/restriction, improving soft tissue extensibility and allowing for proper ROM for normal function with self care, mobility, lifting and ambulation. Modalities:      [] GR/ESU 15 min    [] GR 15 min  [] ESU     [] CP    [] MHP    [] declined  Charges:  Timed Code Treatment Minutes: 40   Total Treatment Minutes: 40     [] EVAL (LOW) 79807 (typically 20 minutes face-to-face)  [] EVAL (MOD) 69037 (typically 30 minutes face-to-face)  [] EVAL (HIGH) 29708 (typically 45 minutes face-to-face)  [] RE-EVAL     [x] KX(98972) x 2   [] IONTO  [] NMR (15224) x   [] VASO  [x] Manual (15182) x 1     [] Other:  [] TA x      [] Mech Traction (08181)  [] ES(attended) (98419)      [] ES (un) (31693):     Goals:Patient stated goal: Pain free and normal walking   [] Progressing: [x] Met: [] Not Met: [] Adjusted     Therapist goals for Patient:   Short Term Goals: To be achieved in: 2 weeks  1. Independent in HEP and progression per patient tolerance, in order to prevent re-injury. [] Progressing: [x] Met: [] Not Met: [] Adjusted   2. Patient will have a decrease in pain to facilitate improvement in movement, function, and ADLs as indicated by Functional Deficits. [] Progressing: [x] Met: [] Not Met: [] Adjusted     Long Term Goals: To be achieved in: 12 weeks  1. Disability index score of 12% or less for the oswestry  to assist with reaching prior level of function.    [x] Progressing: [] Met: [] Not Met: [] Adjusted   2. Patient will demonstrate increased AROM to WNL, good LS mobility, good hip ROM to allow for proper joint functioning as indicated by patients Functional Deficits. [] Progressing: [x] Met: [] Not Met: [] Adjusted   3. Patient will demonstrate an increase in Strength to good proximal hip and core activation to allow for proper functional mobility as indicated by patients Functional Deficits. [x] Progressing: [] Met: [] Not Met: [] Adjusted   4. Patient will return to walking 2 + miles without increased symptoms or restriction. [] Progressing: [x] Met: [] Not Met: [] Adjusted   5. Patient will demonstrate appropriate mechanics with bending forward to pick grand child up from ground without cue for compensation and glute engagement. [] Progressing: [x] Met: [] Not Met: [] Adjusted       Overall Progression Towards Functional goals/ Treatment Progress Update:  [x] Patient is progressing as expected towards functional goals listed. [] Progression is slowed due to complexities/Impairments listed. [] Progression has been slowed due to co-morbidities. [] Plan just implemented, too soon to assess goals progression <30days   [] Goals require adjustment due to lack of progress  [] Patient is not progressing as expected and requires additional follow up with physician  [] Other    Prognosis for POC: [x] Good [] Fair  [] Poor      Patient requires continued skilled intervention: [x] Yes  [] No      ASSESSMENT:  Pt tolerated therex progression well today without any issues. Lengthy discussion on healthy lifestyle modifications - additional resources provided for own research before implementation. Pt will benefit from continued skilled PT to increase core stability and strength, functional mobility to return to PLOF and walking.     Treatment/Activity Tolerance:  [x] Patient tolerated treatment well [] Patient limited by fatigue  [] Patient limited by pain  [] Patient limited by other medical complications  [] Other:       PLAN: See eval  [x] Continue per plan of care [] Alter current plan (see comments above)  [] Plan of care initiated [] Hold pending MD visit [] Discharge      Electronically signed by:  Ar Hernandez, PT , DPT [id number]; Elder Gannon, SPT  Therapist was present, directed the patient's care, made skilled judgement, and was responsible for assessment and treatment of the patient. Note: If patient does not return for scheduled/ recommended follow up visits, this note will serve as a discharge from care along with most recent update on progress. Single Mechanical/Accidental Fall

## 2021-08-04 ENCOUNTER — HOSPITAL ENCOUNTER (OUTPATIENT)
Dept: PHYSICAL THERAPY | Age: 66
Setting detail: THERAPIES SERIES
Discharge: HOME | End: 2021-08-04
Payer: MEDICARE

## 2021-08-04 PROCEDURE — 97140 MANUAL THERAPY 1/> REGIONS: CPT

## 2021-08-04 PROCEDURE — 97110 THERAPEUTIC EXERCISES: CPT

## 2021-08-04 NOTE — FLOWSHEET NOTE
[facility], [address] [place]  Phone: [phone]  Fax [phone]    Physical Therapy Progress Note/Daily Treatment Note  Date:  2021    Patient Name:  Gaby Chiang    :  1955  MRN: [id number]  Restrictions/Precautions:    Physician Information:  Referring Practitioner: Dr. Sherryle Champ  Medical/Treatment Diagnosis Information:  · Diagnosis: M54.5 (ICD-10-CM) - Lumbar spine pain; M48.062 (ICD-10-CM) - Spinal stenosis of lumbar region with neurogenic claudication; M54.16 (ICD-10-CM) - Right lumbar radiculitis  · Treatment Diagnosis: M54.5 Lumbar spine pain; M48.062  Spinal stenosis of lumbar region with neurogenic claudication; M54.16 Right lumbar radiculitis  [x] Conservative / [] Surgical - DOS:  Therapy Diagnosis/Practice Pattern:  Practice Pattern F: Spinal Disorders  Insurance/Certification information:  PT Insurance Information: /AARP  Plan of care signed: [] YES  [] NO  Number of Comorbidities:  []0     []1-2    [x]3+  Date of Patient follow up with Physician:     Is this a Progress Report:     []  Yes  [x]  No        If Yes:  Date Range for reporting period:  Beginning 6/15/2021    Ending 2021    Progress report will be due (10 Rx or 30 days whichever is less):       Recertification will be due (POC Duration  / 90 days whichever is less): 2021      Latex Allergy:  [x]NO      []YES  Preferred Language for Healthcare:   [x]English       []other:    Visit # Insurance Allowable   15 [facility]  auth []no        []yes:       SUBJECTIVE: Pt reports she has been having back spasms, but they have been better since her physician prescribed muscle relaxers. OBJECTIVE:    Observation:   Palpation:     Test used Initial score Current Score   Pain Summary VAS 0-5    Functional questionnaire Oswestry 24% 12/50 20% 10/50   ROM Lumbar Flexion Pain free     Lumbar Ext 80% P!  On R 100% painfree    Lumbar SB L/R 95% P! R>L Painfree    Lumbar rotation L/R 85% P! R>L Pain free 90%   Strength Hip ext 4 4+    ABD 4 4+    TrA       RESTRICTIONS/PRECAUTIONS: none0    Exercises/Interventions:     Therapeutic Ex sets/reps Notes HEP   Prone hip ext knee bent  Prone press up 20 x        LTR  BKFO  SKTC  Supine nerve glides with DF  SB DKTC  SB LTR   15 x  15 x        X   Bridges SB  Marching with band  Clamshells 2 x 12   X   Standing hip ext OVL  Standing hip flex OVL  LBW  15 x  15 x5 laps at table X   Mini squats Cues for posterior shift X   CC walkouts FWD/BKWD/lateral 25#20#10#    Heel raises  30x     Sit to stand  2 x10          Manual Intervention       Joint mobs grade 1-2 5 min     STM lumbar paraspinals, piriformis/glutes, HS 5 min                                         NMR re-education                                       Therapeutic Exercise and NMR EXR  [x] (27940) Provided verbal/tactile cueing for activities related to strengthening, flexibility, endurance, ROM  for improvements in proximal hip and core control with self care, mobility, lifting and ambulation.  [] (39978) Provided verbal/tactile cueing for activities related to improving balance, coordination, kinesthetic sense, posture, motor skill, proprioception  to assist with core control in self care, mobility, lifting, and ambulation.      Therapeutic Activities:    [] (25133 or 56008) Provided verbal/tactile cueing for activities related to improving balance, coordination, kinesthetic sense, posture, motor skill, proprioception and motor activation to allow for proper function  with self care and ADLs  [] (31571) Provided training and instruction to the patient for proper core and proximal hip recruitment and positioning with ambulation re-education     Home Exercise Program:    [x] (53118) Reviewed/Progressed HEP activities related to strengthening, flexibility, endurance, ROM of core, proximal hip and LE for functional self-care, mobility, lifting and ambulation   [] (74125) Reviewed/Progressed HEP activities related to improving balance, coordination, kinesthetic sense, posture, motor skill, proprioception of core, proximal hip and LE for self care, mobility, lifting, and ambulation      Manual Treatments:  PROM / STM / Oscillations-Mobs:  G-I, II, III, IV (PA's, Inf., Post.)  [x] (37569) Provided manual therapy to mobilize proximal hip and LS spine soft tissue/joints for the purpose of modulating pain, promoting relaxation,  increasing ROM, reducing/eliminating soft tissue swelling/inflammation/restriction, improving soft tissue extensibility and allowing for proper ROM for normal function with self care, mobility, lifting and ambulation. Modalities:      [] GR/ESU 15 min    [] GR 15 min  [] ESU     [] CP    [] MHP    [] declined  Charges:  Timed Code Treatment Minutes: 40   Total Treatment Minutes: 40     [] EVAL (LOW) 18132 (typically 20 minutes face-to-face)  [] EVAL (MOD) 91714 (typically 30 minutes face-to-face)  [] EVAL (HIGH) 21807 (typically 45 minutes face-to-face)  [] RE-EVAL     [x] KO(75807) x 2   [] IONTO  [] NMR (37292) x   [] VASO  [x] Manual (92467) x 1     [] Other:  [] TA x      [] Mech Traction (46441)  [] ES(attended) (38455)      [] ES (un) (78282):     Goals:Patient stated goal: Pain free and normal walking   [] Progressing: [x] Met: [] Not Met: [] Adjusted     Therapist goals for Patient:   Short Term Goals: To be achieved in: 2 weeks  1. Independent in HEP and progression per patient tolerance, in order to prevent re-injury. [] Progressing: [x] Met: [] Not Met: [] Adjusted   2. Patient will have a decrease in pain to facilitate improvement in movement, function, and ADLs as indicated by Functional Deficits. [] Progressing: [x] Met: [] Not Met: [] Adjusted     Long Term Goals: To be achieved in: 12 weeks  1. Disability index score of 12% or less for the oswestry  to assist with reaching prior level of function.    [x] Progressing: [] Met: [] Not Met: [] Adjusted   2. Patient will demonstrate increased AROM to WNL, good LS mobility, good hip ROM to allow for proper joint functioning as indicated by patients Functional Deficits. [] Progressing: [x] Met: [] Not Met: [] Adjusted   3. Patient will demonstrate an increase in Strength to good proximal hip and core activation to allow for proper functional mobility as indicated by patients Functional Deficits. [x] Progressing: [] Met: [] Not Met: [] Adjusted   4. Patient will return to walking 2 + miles without increased symptoms or restriction. [] Progressing: [x] Met: [] Not Met: [] Adjusted   5. Patient will demonstrate appropriate mechanics with bending forward to pick grand child up from ground without cue for compensation and glute engagement. [] Progressing: [x] Met: [] Not Met: [] Adjusted       Overall Progression Towards Functional goals/ Treatment Progress Update:  [x] Patient is progressing as expected towards functional goals listed. [] Progression is slowed due to complexities/Impairments listed. [] Progression has been slowed due to co-morbidities. [] Plan just implemented, too soon to assess goals progression <30days   [] Goals require adjustment due to lack of progress  [] Patient is not progressing as expected and requires additional follow up with physician  [] Other    Prognosis for POC: [x] Good [] Fair  [] Poor      Patient requires continued skilled intervention: [x] Yes  [] No      ASSESSMENT:  Pt tolerated therex progression well today without any complaints of increased pain. She states her back spasms have diminished since being prescribed a muscle relaxer and now is primarily having pain in her hamstrings. Pt will benefit from continued skilled PT to increase core stability and strength, functional mobility to return to PLOF and walking.     Treatment/Activity Tolerance:  [x] Patient tolerated treatment well [] Patient limited by fatigue  [] Patient limited by pain  [] Patient limited by other medical complications  [] Other:       PLAN: See eval  [x] Continue per plan of care [] Alter current plan (see comments above)  [] Plan of care initiated [] Hold pending MD visit [] Discharge      Electronically signed by:  Jet Cotton, PT , DPT [id number]; Dianne Zabala, SPT  Therapist was present, directed the patient's care, made skilled judgement, and was responsible for assessment and treatment of the patient. Note: If patient does not return for scheduled/ recommended follow up visits, this note will serve as a discharge from care along with most recent update on progress.

## 2021-08-11 ENCOUNTER — HOSPITAL ENCOUNTER (OUTPATIENT)
Dept: PHYSICAL THERAPY | Age: 66
Setting detail: THERAPIES SERIES
Discharge: HOME | End: 2021-08-11
Payer: MEDICARE

## 2021-08-11 PROCEDURE — 97110 THERAPEUTIC EXERCISES: CPT

## 2021-08-11 PROCEDURE — 97140 MANUAL THERAPY 1/> REGIONS: CPT

## 2021-08-11 NOTE — FLOWSHEET NOTE
[facility], [address]  Phone: [phone]  Fax [phone]    Physical Therapy Progress Note/Daily Treatment Note  Date:  2021    Patient Name:  Luke Freitas    :  1955  MRN: [id number]  Restrictions/Precautions:    Physician Information:  Referring Practitioner: Dr. Vicente Fairbanks  Medical/Treatment Diagnosis Information:  · Diagnosis: M54.5 (ICD-10-CM) - Lumbar spine pain; M48.062 (ICD-10-CM) - Spinal stenosis of lumbar region with neurogenic claudication; M54.16 (ICD-10-CM) - Right lumbar radiculitis  · Treatment Diagnosis: M54.5 Lumbar spine pain; M48.062  Spinal stenosis of lumbar region with neurogenic claudication; M54.16 Right lumbar radiculitis  [x] Conservative / [] Surgical - DOS:  Therapy Diagnosis/Practice Pattern:  Practice Pattern F: Spinal Disorders  Insurance/Certification information:  PT Insurance Information: /[facility]  Plan of care signed: [] YES  [] NO  Number of Comorbidities:  []0     []1-2    [x]3+  Date of Patient follow up with Physician:     Is this a Progress Report:     []  Yes  [x]  No        If Yes:  Date Range for reporting period:  Beginning 6/15/2021    Ending 2021    Progress report will be due (10 Rx or 30 days whichever is less):       Recertification will be due (POC Duration  / 90 days whichever is less): 2021      Latex Allergy:  [x]NO      []YES  Preferred Language for Healthcare:   [x]English       []other:    Visit # Insurance Allowable   15 [facility]  auth []no        []yes:       SUBJECTIVE: Pt reports her back spasms have gone away but is now dealing with some hamstring pain that is targeted toward the lateral portion of both of her legs.   OBJECTIVE:    Observation:   Palpation:     Test used Initial score Current Score   Pain Summary VAS 0-5    Functional questionnaire Oswestry 24% 12/50 20% 10/50   ROM Lumbar Flexion Pain free     Lumbar Ext 80% P! On R 100% painfree    Lumbar SB L/R 95% P! R>L Painfree    Lumbar rotation L/R 85% P! R>L Pain free 90%   Strength Hip ext 4 4+    ABD 4 4+    TrA       RESTRICTIONS/PRECAUTIONS: none0    Exercises/Interventions:     Therapeutic Ex sets/reps Notes HEP   Prone hip ext knee bent  Prone press up 20 x        LTR  BKFO  SKTC  Supine nerve glides with DF  SB DKTC  SB LTR     15 x        X   Bridges w/march Marching with band  Clamshells 2 x 12   X   Standing hip ext OVL  Standing hip flex OVL  LBW  15 x  15 x5 laps at table X   Mini squats Cues for posterior shift X   CC walkouts FWD/BKWD/lateral 25#20#10#    Heel raises  30x     Sit to stand  2 x10    Pt education on progression of PT and possible discharge to independent [facility] 3 min     Manual Intervention       Joint mobs grade 1-2      STM lumbar paraspinals, piriformis/glutes, HS 8 min                                         NMR re-education                                       Therapeutic Exercise and NMR EXR  [x] (22879) Provided verbal/tactile cueing for activities related to strengthening, flexibility, endurance, ROM  for improvements in proximal hip and core control with self care, mobility, lifting and ambulation.  [] (09270) Provided verbal/tactile cueing for activities related to improving balance, coordination, kinesthetic sense, posture, motor skill, proprioception  to assist with core control in self care, mobility, lifting, and ambulation.      Therapeutic Activities:    [] (73330 or 77493) Provided verbal/tactile cueing for activities related to improving balance, coordination, kinesthetic sense, posture, motor skill, proprioception and motor activation to allow for proper function  with self care and ADLs  [] (17566) Provided training and instruction to the patient for proper core and proximal hip recruitment and positioning with ambulation re-education     Home Exercise Program:    [x] (29793) Reviewed/Progressed HEP activities related to strengthening, flexibility, endurance, ROM of core, proximal hip and LE for functional self-care, mobility, lifting and ambulation   [] (08486) Reviewed/Progressed HEP activities related to improving balance, coordination, kinesthetic sense, posture, motor skill, proprioception of core, proximal hip and LE for self care, mobility, lifting, and ambulation      Manual Treatments:  PROM / STM / Oscillations-Mobs:  G-I, II, III, IV (PA's, Inf., Post.)  [x] (58561) Provided manual therapy to mobilize proximal hip and LS spine soft tissue/joints for the purpose of modulating pain, promoting relaxation,  increasing ROM, reducing/eliminating soft tissue swelling/inflammation/restriction, improving soft tissue extensibility and allowing for proper ROM for normal function with self care, mobility, lifting and ambulation. Modalities:      [] GR/ESU 15 min    [] GR 15 min  [] ESU     [] CP    [] MHP    [] declined  Charges:  Timed Code Treatment Minutes: 38   Total Treatment Minutes: 38     [] EVAL (LOW) 67409 (typically 20 minutes face-to-face)  [] EVAL (MOD) 13094 (typically 30 minutes face-to-face)  [] EVAL (HIGH) 96890 (typically 45 minutes face-to-face)  [] RE-EVAL     [x] UR(22266) x 2   [] IONTO  [] NMR (17316) x   [] VASO  [x] Manual (68450) x 1     [] Other:  [] TA x      [] Mech Traction (01637)  [] ES(attended) (09520)      [] ES (un) (87954):     Goals:Patient stated goal: Pain free and normal walking   [] Progressing: [x] Met: [] Not Met: [] Adjusted     Therapist goals for Patient:   Short Term Goals: To be achieved in: 2 weeks  1. Independent in HEP and progression per patient tolerance, in order to prevent re-injury. [] Progressing: [x] Met: [] Not Met: [] Adjusted   2. Patient will have a decrease in pain to facilitate improvement in movement, function, and ADLs as indicated by Functional Deficits. [] Progressing: [x] Met: [] Not Met: [] Adjusted     Long Term Goals: To be achieved in: 12 weeks  1. Disability index score of 12% or less for the oswestry  to assist with reaching prior level of function. [x] Progressing: [] Met: [] Not Met: [] Adjusted   2. Patient will demonstrate increased AROM to WNL, good LS mobility, good hip ROM to allow for proper joint functioning as indicated by patients Functional Deficits. [] Progressing: [x] Met: [] Not Met: [] Adjusted   3. Patient will demonstrate an increase in Strength to good proximal hip and core activation to allow for proper functional mobility as indicated by patients Functional Deficits. [x] Progressing: [] Met: [] Not Met: [] Adjusted   4. Patient will return to walking 2 + miles without increased symptoms or restriction. [] Progressing: [x] Met: [] Not Met: [] Adjusted   5. Patient will demonstrate appropriate mechanics with bending forward to pick grand child up from ground without cue for compensation and glute engagement. [] Progressing: [x] Met: [] Not Met: [] Adjusted       Overall Progression Towards Functional goals/ Treatment Progress Update:  [x] Patient is progressing as expected towards functional goals listed. [] Progression is slowed due to complexities/Impairments listed. [] Progression has been slowed due to co-morbidities. [] Plan just implemented, too soon to assess goals progression <30days   [] Goals require adjustment due to lack of progress  [] Patient is not progressing as expected and requires additional follow up with physician  [] Other    Prognosis for POC: [x] Good [] Fair  [] Poor      Patient requires continued skilled intervention: [x] Yes  [] No      ASSESSMENT:  Pt tolerated therex progression well today without any complaints of increased pain. She states her back spasm have continue to be not be in issue, but states her hamstrings seems to be bothering her more.    Pt will benefit from continued skilled PT to increase core stability and strength, functional mobility to return to OF and walking. Treatment/Activity Tolerance:  [x] Patient tolerated treatment well [] Patient limited by fatigue  [] Patient limited by pain  [] Patient limited by other medical complications  [] Other:       PLAN: See eval  [x] Continue per plan of care [] Alter current plan (see comments above)  [] Plan of care initiated [] Hold pending MD visit [] Discharge      Electronically signed by:  Jet Cotton, PT , DPT [id number]; Dianne Zabala, SPT  Therapist was present, directed the patient's care, made skilled judgement, and was responsible for assessment and treatment of the patient. Note: If patient does not return for scheduled/ recommended follow up visits, this note will serve as a discharge from care along with most recent update on progress.

## 2021-08-18 ENCOUNTER — HOSPITAL ENCOUNTER (OUTPATIENT)
Dept: PHYSICAL THERAPY | Age: 66
Setting detail: THERAPIES SERIES
Discharge: HOME | End: 2021-08-18
Payer: MEDICARE

## 2021-08-18 PROCEDURE — 97112 NEUROMUSCULAR REEDUCATION: CPT

## 2021-08-18 PROCEDURE — 97110 THERAPEUTIC EXERCISES: CPT

## 2021-08-18 PROCEDURE — 97140 MANUAL THERAPY 1/> REGIONS: CPT

## 2021-08-18 NOTE — PLAN OF CARE
[facility], [address] [place] [address]  Phone: [phone]  Fax [phone]  Physical Therapy Re-Certification Plan of Care/MD UPDATE      Dear Referring Practitioner: Dr. Nitish Rice,    We had the pleasure of treating the following patient for physical therapy services at [address] [place] [address]. A summary of our findings can be found in the updated assessment below. This includes our plan of care. If you have any questions or concerns regarding these findings, please do not hesitate to contact me at the office phone number checked above. Thank you for the referral.     Physician Signature:________________________________Date:__________________  By signing above (or electronic signature), therapists plan is approved by physician     Date Range Of Visits: 2021 - 2021  Total Visits to Date: 15  Overall Response to Treatment:   []Patient is responding well to treatment and improvement is noted with regards  to goals   []Patient should continue to improve in reasonable time if they continue HEP   []Patient has plateaued and is no longer responding to skilled PT intervention    []Patient is getting worse and would benefit from return to referring MD   []Patient unable to adhere to initial POC   [x]Other: Pt's complaints of back pain have been resolved, but is now complaining of pain in both HS that is increasing her disability score.         Physical Therapy Progress Note/Daily Treatment Note  Date:  2021    Patient Name:  King Hernandez    :  1955  MRN: [id number]  Restrictions/Precautions:    Physician Information:  Referring Practitioner: Dr. Nitish Rice  Medical/Treatment Diagnosis Information:  · Diagnosis: M54.5 (ICD-10-CM) - Lumbar spine pain; M48.062 (ICD-10-CM) - Spinal stenosis of lumbar region with neurogenic claudication; M54.16 (ICD-10-CM) - Right lumbar radiculitis  · Treatment Diagnosis: M54.5 Lumbar spine pain; M48.062  Spinal stenosis of lumbar region with neurogenic claudication; M54.16 Right lumbar radiculitis  [x] Conservative / [] Surgical - DOS:  Therapy Diagnosis/Practice Pattern:  Practice Pattern F: Spinal Disorders  Insurance/Certification information:  [facility]  Plan of care signed: [] YES  [] NO  Number of Comorbidities:  []0     []1-2    [x]3+  Date of Patient follow up with Physician:     Is this a Progress Report:     [x]  Yes  []  No        If Yes:  Date Range for reporting period:  Beginning 7/23/2021   Ending 8/18/2021    Progress report will be due (10 Rx or 30 days whichever is less): 4/93/1969      Recertification will be due (POC Duration  / 90 days whichever is less): 11/10/2021      Latex Allergy:  [x]NO      []YES  Preferred Language for Healthcare:   [x]English       []other:    Visit # Insurance Allowable   13 [facility]  auth []no        []yes:       SUBJECTIVE: Pt reports she felt really good about 3 days. States she painted a long time yesterday and didn't have any issues. States she had some pain in both her legs this morning  OBJECTIVE:    Observation:   Palpation:     Test used Initial score Current Score   Pain Summary VAS 0-5 2-8   Functional questionnaire Oswestry 24% 12/50 26% 13/50   ROM Lumbar Flexion Pain free Pain free    Lumbar Ext 80% P! On R 100% painfree    Lumbar SB L/R 95% P! R>L Painfree    Lumbar rotation L/R 85% P! R>L Pain free 90%   Strength Hip ext 4 3+    ABD 4 3+ ! P/ no ! P on R    TrA       RESTRICTIONS/PRECAUTIONS: none0    Exercises/Interventions:     Therapeutic Ex sets/reps Notes HEP   Prone hip ext knee bent  Prone press up  Prone HS curl 20 x   2x10     2#    LTR  BKFO  SKTC  Supine nerve glides with DF  SB DKTC  SB LTR     15 x        X   Bridges   Marching with band  Clamshells 2 x 12   X   Standing hip ext OVL  Standing hip flex OVL  LBW  15 x  15 x5 laps at table X   Mini squats Cues for posterior shift X   CC walkouts FWD/BKWD/lateral 25#20#10#    Heel raises  30x     Stair negotiation x10     Narrow balance on airex 3x30\"              Sit to stand  2 x10    Pt education on progression of PT and possible use of AD for increased activity tolerance 10 min     Manual Intervention       Joint mobs grade 1-2      STM lumbar paraspinals, piriformis/glutes, HS 8 min                                         NMR re-education                                       Therapeutic Exercise and NMR EXR  [x] (74811) Provided verbal/tactile cueing for activities related to strengthening, flexibility, endurance, ROM  for improvements in proximal hip and core control with self care, mobility, lifting and ambulation.  [] (26187) Provided verbal/tactile cueing for activities related to improving balance, coordination, kinesthetic sense, posture, motor skill, proprioception  to assist with core control in self care, mobility, lifting, and ambulation.      Therapeutic Activities:    [] (71514 or 44650) Provided verbal/tactile cueing for activities related to improving balance, coordination, kinesthetic sense, posture, motor skill, proprioception and motor activation to allow for proper function  with self care and ADLs  [] (13128) Provided training and instruction to the patient for proper core and proximal hip recruitment and positioning with ambulation re-education     Home Exercise Program:    [x] (02238) Reviewed/Progressed HEP activities related to strengthening, flexibility, endurance, ROM of core, proximal hip and LE for functional self-care, mobility, lifting and ambulation   [] (57967) Reviewed/Progressed HEP activities related to improving balance, coordination, kinesthetic sense, posture, motor skill, proprioception of core, proximal hip and LE for self care, mobility, lifting, and ambulation      Manual Treatments:  PROM / STM / Oscillations-Mobs:  G-I, II, III, IV (PA's, Inf., Post.)  [x] (31189) Provided manual therapy to mobilize proximal hip and LS spine soft tissue/joints for the purpose of modulating pain, promoting relaxation,  increasing ROM, reducing/eliminating soft tissue swelling/inflammation/restriction, improving soft tissue extensibility and allowing for proper ROM for normal function with self care, mobility, lifting and ambulation. Modalities:      [] GR/ESU 15 min    [] GR 15 min  [] ESU     [] CP    [] MHP    [] declined  Charges:  Timed Code Treatment Minutes: 55   Total Treatment Minutes: 55     [] EVAL (LOW) 03583 (typically 20 minutes face-to-face)  [] EVAL (MOD) 59431 (typically 30 minutes face-to-face)  [] EVAL (HIGH) 36805 (typically 45 minutes face-to-face)  [] RE-EVAL     [x] [place](04666) x 2   [] IONTO  [x] NMR (69395) x 1  [] VASO  [x] Manual (32503) x 1     [] Other:  [] TA x      [] Mech Traction (16576)  [] ES(attended) (02060)      [] ES (un) (03004):     Goals:Patient stated goal: Pain free and normal walking   [] Progressing: [x] Met: [] Not Met: [] Adjusted     Therapist goals for Patient:   Short Term Goals: To be achieved in: 2 weeks  1. Independent in HEP and progression per patient tolerance, in order to prevent re-injury. [] Progressing: [x] Met: [] Not Met: [] Adjusted   2. Patient will have a decrease in pain to facilitate improvement in movement, function, and ADLs as indicated by Functional Deficits. [] Progressing: [x] Met: [] Not Met: [] Adjusted     Long Term Goals: To be achieved in: 12 weeks  1. Disability index score of 12% or less for the oswestry  to assist with reaching prior level of function. [x] Progressing: [] Met: [] Not Met: [] Adjusted   2. Patient will demonstrate increased AROM to WNL, good LS mobility, good hip ROM to allow for proper joint functioning as indicated by patients Functional Deficits. [] Progressing: [x] Met: [] Not Met: [] Adjusted   3.  Patient will demonstrate an increase in Strength to good proximal hip and core activation to allow for proper functional mobility as indicated by patients Functional Deficits. [x] Progressing: [] Met: [] Not Met: [] Adjusted   4. Patient will return to walking 2 + miles without increased symptoms or restriction. [] Progressing: [x] Met: [] Not Met: [] Adjusted   5. Patient will demonstrate appropriate mechanics with bending forward to pick grand child up from ground without cue for compensation and glute engagement. [] Progressing: [x] Met: [] Not Met: [] Adjusted       Overall Progression Towards Functional goals/ Treatment Progress Update:  [x] Patient is progressing as expected towards functional goals listed. [] Progression is slowed due to complexities/Impairments listed. [] Progression has been slowed due to co-morbidities. [] Plan just implemented, too soon to assess goals progression <30days   [] Goals require adjustment due to lack of progress  [] Patient is not progressing as expected and requires additional follow up with physician  [] Other    Prognosis for POC: [x] Good [] Fair  [] Poor      Patient requires continued skilled intervention: [x] Yes  [] No      ASSESSMENT:  Pt tolerated therex progression well today without any complaints of increased pain. She states her back has not been bothering her as she was able to tolerate a full 3 hour painting session with no complaints of pain. At this time her hamstrings bilaterally seem to be causing her the most disability as she describes her pain upon waking as 8/10 when she attempts to get up from after a full nights rest.  She states that this occurs whenever her legs have been straight for a prolonged period of time, or when she is sitting on a chair where it directly compresses her hamstrings. Also, discussed the possible use of an assistive device to increase her overall activity tolerance as she expressed some concern with going to [employer] football games this season.   Pt will benefit from continued skilled PT to increase core stability and strength, functional

## 2021-08-26 ENCOUNTER — HOSPITAL ENCOUNTER (OUTPATIENT)
Dept: PHYSICAL THERAPY | Age: 66
Setting detail: THERAPIES SERIES
Discharge: HOME | End: 2021-08-26
Payer: MEDICARE

## 2021-08-26 PROCEDURE — 97110 THERAPEUTIC EXERCISES: CPT

## 2021-08-26 PROCEDURE — 97140 MANUAL THERAPY 1/> REGIONS: CPT

## 2021-08-26 NOTE — FLOWSHEET NOTE
L/R 85% P! R>L Pain free 90%   Strength Hip ext 4 3+    ABD 4 3+ ! P/ no ! P on R    TrA       RESTRICTIONS/PRECAUTIONS: none0    Exercises/Interventions:     Therapeutic Ex sets/reps Notes HEP   Prone hip ext knee bent  Prone press up  Prone HS curl 20 x   2x10     2#    LTR  BKFO  SKTC  Supine nerve glides with DF  SB DKTC  SB LTR     15 x        X   Bridges   Marching with band  Clamshells 2 x 12   X   Standing hip ext OVL  Standing hip flex OVL  LBW  15 x  15 x5 laps at table X   Mini squats Cues for posterior shift X   CC walkouts FWD/BKWD/lateral 25#20#10#    Heel raises  30x     HS curl machine 2 x 10 20#         Sit to stand  2 x10    Pt education on progression of PT and possible use of AD for increased activity tolerance 10 min     Manual Intervention       Joint mobs grade 1-2      STM lumbar paraspinals, piriformis/glutes, HS 8 min                                         NMR re-education                                       Therapeutic Exercise and NMR EXR  [x] (24034) Provided verbal/tactile cueing for activities related to strengthening, flexibility, endurance, ROM  for improvements in proximal hip and core control with self care, mobility, lifting and ambulation.  [] (15408) Provided verbal/tactile cueing for activities related to improving balance, coordination, kinesthetic sense, posture, motor skill, proprioception  to assist with core control in self care, mobility, lifting, and ambulation.      Therapeutic Activities:    [] (06236 or 75411) Provided verbal/tactile cueing for activities related to improving balance, coordination, kinesthetic sense, posture, motor skill, proprioception and motor activation to allow for proper function  with self care and ADLs  [] (22859) Provided training and instruction to the patient for proper core and proximal hip recruitment and positioning with ambulation re-education     Home Exercise Program:    [x] (62602) Reviewed/Progressed HEP activities related to strengthening, flexibility, endurance, ROM of core, proximal hip and LE for functional self-care, mobility, lifting and ambulation   [] (47276) Reviewed/Progressed HEP activities related to improving balance, coordination, kinesthetic sense, posture, motor skill, proprioception of core, proximal hip and LE for self care, mobility, lifting, and ambulation      Manual Treatments:  PROM / STM / Oscillations-Mobs:  G-I, II, III, IV (PA's, Inf., Post.)  [x] (51777) Provided manual therapy to mobilize proximal hip and LS spine soft tissue/joints for the purpose of modulating pain, promoting relaxation,  increasing ROM, reducing/eliminating soft tissue swelling/inflammation/restriction, improving soft tissue extensibility and allowing for proper ROM for normal function with self care, mobility, lifting and ambulation. Modalities:      [] GR/ESU 15 min    [] GR 15 min  [] ESU     [] CP    [] MHP    [] declined  Charges:  Timed Code Treatment Minutes: 45   Total Treatment Minutes: 45     [] EVAL (LOW) 82633 (typically 20 minutes face-to-face)  [] EVAL (MOD) 60226 (typically 30 minutes face-to-face)  [] EVAL (HIGH) 75311 (typically 45 minutes face-to-face)  [] RE-EVAL     [x] GM(19966) x 2   [] IONTO  [] NMR (14856) x   [] VASO  [x] Manual (54998) x 1     [] Other:  [] TA x      [] Mech Traction (58446)  [] ES(attended) (96458)      [] ES (un) (29434):     Goals:Patient stated goal: Pain free and normal walking   [] Progressing: [x] Met: [] Not Met: [] Adjusted     Therapist goals for Patient:   Short Term Goals: To be achieved in: 2 weeks  1. Independent in HEP and progression per patient tolerance, in order to prevent re-injury. [] Progressing: [x] Met: [] Not Met: [] Adjusted   2. Patient will have a decrease in pain to facilitate improvement in movement, function, and ADLs as indicated by Functional Deficits. [] Progressing: [x] Met: [] Not Met: [] Adjusted     Long Term Goals: To be achieved in: 12 weeks  1. Disability index score of 12% or less for the oswestry  to assist with reaching prior level of function. [x] Progressing: [] Met: [] Not Met: [] Adjusted   2. Patient will demonstrate increased AROM to WNL, good LS mobility, good hip ROM to allow for proper joint functioning as indicated by patients Functional Deficits. [] Progressing: [x] Met: [] Not Met: [] Adjusted   3. Patient will demonstrate an increase in Strength to good proximal hip and core activation to allow for proper functional mobility as indicated by patients Functional Deficits. [x] Progressing: [] Met: [] Not Met: [] Adjusted   4. Patient will return to walking 2 + miles without increased symptoms or restriction. [] Progressing: [x] Met: [] Not Met: [] Adjusted   5. Patient will demonstrate appropriate mechanics with bending forward to pick grand child up from ground without cue for compensation and glute engagement. [] Progressing: [x] Met: [] Not Met: [] Adjusted       Overall Progression Towards Functional goals/ Treatment Progress Update:  [x] Patient is progressing as expected towards functional goals listed. [] Progression is slowed due to complexities/Impairments listed. [] Progression has been slowed due to co-morbidities. [] Plan just implemented, too soon to assess goals progression <30days   [] Goals require adjustment due to lack of progress  [] Patient is not progressing as expected and requires additional follow up with physician  [] Other    Prognosis for POC: [x] Good [] Fair  [] Poor      Patient requires continued skilled intervention: [x] Yes  [] No      ASSESSMENT:  Pt tolerated therex progression well today without any complaints of increased pain. She states her back has not been bothering her as she was able to tolerate a full 3 hour painting session with no complaints of pain.   At this time her hamstrings bilaterally seem to be causing her the most disability as she describes her pain upon waking as 8/10 when she attempts to get up from after a full nights rest.  She states that this occurs whenever her legs have been straight for a prolonged period of time, or when she is sitting on a chair where it directly compresses her hamstrings. Also, discussed the possible use of an assistive device to increase her overall activity tolerance as she expressed some concern with going to [employer] football games this season. Pt will benefit from continued skilled PT to increase core stability and strength, functional mobility to return to PLOF and walking. Treatment/Activity Tolerance:  [x] Patient tolerated treatment well [] Patient limited by fatigue  [] Patient limited by pain  [] Patient limited by other medical complications  [] Other:       PLAN: 1 x week up to 12 weeks 11/10/2021  [x] Continue per plan of care [] Alter current plan (see comments above)  [] Plan of care initiated [] Hold pending MD visit [] Discharge      Electronically signed by:  Kristine Boyer PT , DPT [id number]; Chandan Thompson SPT  Therapist was present, directed the patient's care, made skilled judgement, and was responsible for assessment and treatment of the patient. Note: If patient does not return for scheduled/ recommended follow up visits, this note will serve as a discharge from care along with most recent update on progress.

## 2021-09-01 ENCOUNTER — TELEPHONE (OUTPATIENT)
Dept: ORTHOPEDIC SURGERY | Age: 66
End: 2021-09-01

## 2021-09-01 ENCOUNTER — HOSPITAL ENCOUNTER (OUTPATIENT)
Dept: PHYSICAL THERAPY | Age: 66
Setting detail: THERAPIES SERIES
Discharge: HOME | End: 2021-09-01
Payer: MEDICARE

## 2021-09-01 PROCEDURE — 97140 MANUAL THERAPY 1/> REGIONS: CPT

## 2021-09-01 PROCEDURE — 97110 THERAPEUTIC EXERCISES: CPT

## 2021-09-01 NOTE — FLOWSHEET NOTE
[facility], [address]  Phone: [phone]  Fax [phone]    Physical Therapy Progress Note/Daily Treatment Note  Date:  2021    Patient Name:  Kassandra Up    :  1955  MRN: [id number]  Restrictions/Precautions:    Physician Information:  Referring Practitioner: Dr. Angeles Perez  Medical/Treatment Diagnosis Information:  · Diagnosis: M54.5 (ICD-10-CM) - Lumbar spine pain; M48.062 (ICD-10-CM) - Spinal stenosis of lumbar region with neurogenic claudication; M54.16 (ICD-10-CM) - Right lumbar radiculitis  · Treatment Diagnosis: M54.5 Lumbar spine pain; M48.062  Spinal stenosis of lumbar region with neurogenic claudication; M54.16 Right lumbar radiculitis  [x] Conservative / [] Surgical - DOS:  Therapy Diagnosis/Practice Pattern:  Practice Pattern F: Spinal Disorders  Insurance/Certification information:  PT Insurance Information: /[employer]  Plan of care signed: [] YES  [] NO  Number of Comorbidities:  []0     []1-2    [x]3+  Date of Patient follow up with Physician:     Is this a Progress Report:     [x]  Yes  []  No        If Yes:  Date Range for reporting period:  Beginning 2021   Ending 2021    Progress report will be due (10 Rx or 30 days whichever is less):       Recertification will be due (POC Duration  / 90 days whichever is less): 11/10/2021      Latex Allergy:  [x]NO      []YES  Preferred Language for Healthcare:   [x]English       []other:    Visit # Insurance Allowable   12   auth []no        []yes:       SUBJECTIVE: Pt reports she was doing pretty good overall past few days but today is having some increase in symptoms.  Still not walking as far as she wants    OBJECTIVE:    Observation:   Palpation:     Test used Initial score Current Score   Pain Summary VAS 0-5 2-8   Functional questionnaire Oswestry 24%  26%    ROM Lumbar Flexion Pain free Pain free    Lumbar Ext 80% P! On R 100% painfree    Lumbar SB L/R 95% P! R>L Painfree    Lumbar rotation L/R 85% P! R>L Pain free 90%   Strength Hip ext 4 3+    ABD 4 3+ ! P/ no ! P on R    TrA       RESTRICTIONS/PRECAUTIONS: none0    Exercises/Interventions:     Therapeutic Ex sets/reps Notes HEP   Prone hip ext knee bent  Prone press up  Prone HS curl 20 x   2x10     2#    LTR  BKFO  SKTC  Supine nerve glides with DF  SB DKTC  SB LTR     15 x        X   Bridges   Marching with band  Clamshells 2 x 12   X   Standing hip ext OVL  Standing hip flex OVL  LBW   X   Mini squats Cues for posterior shift X   CC walkouts FWD/BKWD/lateral 25#20#10#    Heel raises  30x     HS curl machine 2 x 10 25#         Sit to stand  2 x10    Pt education on progression of PT and possible use of AD for increased activity tolerance n     Manual Intervention       Joint mobs grade 1-2      STM lumbar paraspinals, piriformis/glutes, HS 12 min SIJ R                                        NMR re-education                                       Therapeutic Exercise and NMR EXR  [x] (63039) Provided verbal/tactile cueing for activities related to strengthening, flexibility, endurance, ROM  for improvements in proximal hip and core control with self care, mobility, lifting and ambulation.  [] (88473) Provided verbal/tactile cueing for activities related to improving balance, coordination, kinesthetic sense, posture, motor skill, proprioception  to assist with core control in self care, mobility, lifting, and ambulation.      Therapeutic Activities:    [] (39134 or 69638) Provided verbal/tactile cueing for activities related to improving balance, coordination, kinesthetic sense, posture, motor skill, proprioception and motor activation to allow for proper function  with self care and ADLs  [] (34543) Provided training and instruction to the patient for proper core and proximal hip recruitment and positioning with ambulation re-education     Home Exercise Program: Adjusted     Long Term Goals: To be achieved in: 12 weeks  1. Disability index score of 12% or less for the oswestry  to assist with reaching prior level of function. [x] Progressing: [] Met: [] Not Met: [] Adjusted   2. Patient will demonstrate increased AROM to WNL, good LS mobility, good hip ROM to allow for proper joint functioning as indicated by patients Functional Deficits. [] Progressing: [x] Met: [] Not Met: [] Adjusted   3. Patient will demonstrate an increase in Strength to good proximal hip and core activation to allow for proper functional mobility as indicated by patients Functional Deficits. [x] Progressing: [] Met: [] Not Met: [] Adjusted   4. Patient will return to walking 2 + miles without increased symptoms or restriction. [] Progressing: [x] Met: [] Not Met: [] Adjusted   5. Patient will demonstrate appropriate mechanics with bending forward to pick grand child up from ground without cue for compensation and glute engagement. [] Progressing: [x] Met: [] Not Met: [] Adjusted       Overall Progression Towards Functional goals/ Treatment Progress Update:  [x] Patient is progressing as expected towards functional goals listed. [] Progression is slowed due to complexities/Impairments listed. [] Progression has been slowed due to co-morbidities. [] Plan just implemented, too soon to assess goals progression <30days   [] Goals require adjustment due to lack of progress  [] Patient is not progressing as expected and requires additional follow up with physician  [] Other    Prognosis for POC: [x] Good [] Fair  [] Poor      Patient requires continued skilled intervention: [x] Yes  [] No      ASSESSMENT:  Pt tolerated therex progression well today without any complaints of increased pain. She states her back has not been bothering her as she was able to tolerate a full 3 hour painting session with no complaints of pain.   At this time her hamstrings bilaterally seem to be causing her the most disability as she describes her pain upon waking as 8/10 when she attempts to get up from after a full nights rest.  She states that this occurs whenever her legs have been straight for a prolonged period of time, or when she is sitting on a chair where it directly compresses her hamstrings. Also, discussed the possible use of an assistive device to increase her overall activity tolerance as she expressed some concern with going to [employer] football games this season. Pt will benefit from continued skilled PT to increase core stability and strength, functional mobility to return to PLOF and walking. Treatment/Activity Tolerance:  [x] Patient tolerated treatment well [] Patient limited by fatigue  [] Patient limited by pain  [] Patient limited by other medical complications  [] Other:       PLAN: 1 x week up to 12 weeks 11/10/2021  [x] Continue per plan of care [] Alter current plan (see comments above)  [] Plan of care initiated [] Hold pending MD visit [] Discharge      Electronically signed by:  Charisma Velazquez PT , DPT [id number]; Rosana Armstrong, [facility]  Therapist was present, directed the patient's care, made skilled judgement, and was responsible for assessment and treatment of the patient. Note: If patient does not return for scheduled/ recommended follow up visits, this note will serve as a discharge from care along with most recent update on progress.

## 2021-09-08 ENCOUNTER — HOSPITAL ENCOUNTER (OUTPATIENT)
Dept: PHYSICAL THERAPY | Age: 66
Setting detail: THERAPIES SERIES
Discharge: HOME | End: 2021-09-08
Payer: MEDICARE

## 2021-09-08 PROCEDURE — 97112 NEUROMUSCULAR REEDUCATION: CPT

## 2021-09-08 PROCEDURE — 97140 MANUAL THERAPY 1/> REGIONS: CPT

## 2021-09-08 PROCEDURE — 97110 THERAPEUTIC EXERCISES: CPT

## 2021-09-08 NOTE — FLOWSHEET NOTE
80% P! On R 100% painfree    Lumbar SB L/R 95% P! R>L Painfree    Lumbar rotation L/R 85% P! R>L Pain free 90%   Strength Hip ext 4 3+    ABD 4 3+ ! P/ no ! P on R    TrA       RESTRICTIONS/PRECAUTIONS: none    Exercises/Interventions:     Therapeutic Ex sets/reps Notes HEP   Prone hip ext knee bent  Prone press up  Prone HS curl 20 x   2x10     2#    LTR  BKFO  SKTC  Supine nerve glides with DF  SB DKTC  SB LTR     15 x        X   Bridges   Marching with band  Clamshells  LAQ with HS curl seated 2 x 15  2 x 12  2 x 1220 x greenGreen  Green  Green  X   Standing hip ext green  Standing hip flex green  LBW green 15 x  15 x5 laps at table X   Mini squats 30 xCues for posterior shift X   CC walkouts FWD/BKWD/lateral 25#20#10#    Heel raises  30x     HS curl machine 2 x 10 25#         Sit to stand  2 x10    Bike 10 min     Manual Intervention       Joint mobs grade 1-2      STM lumbar paraspinals, piriformis/glutes, HS 12 min SIJ R                                        NMR re-education                                       Therapeutic Exercise and NMR EXR  [x] (59679) Provided verbal/tactile cueing for activities related to strengthening, flexibility, endurance, ROM  for improvements in proximal hip and core control with self care, mobility, lifting and ambulation.  [] (56394) Provided verbal/tactile cueing for activities related to improving balance, coordination, kinesthetic sense, posture, motor skill, proprioception  to assist with core control in self care, mobility, lifting, and ambulation.      Therapeutic Activities:    [] (86268 or 66596) Provided verbal/tactile cueing for activities related to improving balance, coordination, kinesthetic sense, posture, motor skill, proprioception and motor activation to allow for proper function  with self care and ADLs  [] (56887) Provided training and instruction to the patient for proper core and proximal hip recruitment and positioning with ambulation re-education     Home Exercise Program:    [x] (95628) Reviewed/Progressed HEP activities related to strengthening, flexibility, endurance, ROM of core, proximal hip and LE for functional self-care, mobility, lifting and ambulation   [] (46240) Reviewed/Progressed HEP activities related to improving balance, coordination, kinesthetic sense, posture, motor skill, proprioception of core, proximal hip and LE for self care, mobility, lifting, and ambulation      Manual Treatments:  PROM / STM / Oscillations-Mobs:  G-I, II, III, IV (PA's, Inf., Post.)  [x] (82922) Provided manual therapy to mobilize proximal hip and LS spine soft tissue/joints for the purpose of modulating pain, promoting relaxation,  increasing ROM, reducing/eliminating soft tissue swelling/inflammation/restriction, improving soft tissue extensibility and allowing for proper ROM for normal function with self care, mobility, lifting and ambulation. Modalities:      [] GR/ESU 15 min    [] GR 15 min  [] ESU     [] CP    [] MHP    [] declined  Charges:  Timed Code Treatment Minutes: 53   Total Treatment Minutes: 53     [] EVAL (LOW) 11108 (typically 20 minutes face-to-face)  [] EVAL (MOD) 50501 (typically 30 minutes face-to-face)  [] EVAL (HIGH) 57795 (typically 45 minutes face-to-face)  [] RE-EVAL     [x] HO(90696) x 2   [] IONTO  [x] NMR (73420) x 1  [] VASO  [x] Manual (51572) x 1     [] Other:  [] TA x      [] Mech Traction (63691)  [] ES(attended) (59514)      [] ES (un) (45515):     Goals:Patient stated goal: Pain free and normal walking   [] Progressing: [x] Met: [] Not Met: [] Adjusted     Therapist goals for Patient:   Short Term Goals: To be achieved in: 2 weeks  1. Independent in HEP and progression per patient tolerance, in order to prevent re-injury. [] Progressing: [x] Met: [] Not Met: [] Adjusted   2. Patient will have a decrease in pain to facilitate improvement in movement, function, and ADLs as indicated by Functional Deficits.   [] Progressing: [x] Met: [] Not Met: [] Adjusted     Long Term Goals: To be achieved in: 12 weeks  1. Disability index score of 12% or less for the oswestry  to assist with reaching prior level of function. [x] Progressing: [] Met: [] Not Met: [] Adjusted   2. Patient will demonstrate increased AROM to WNL, good LS mobility, good hip ROM to allow for proper joint functioning as indicated by patients Functional Deficits. [] Progressing: [x] Met: [] Not Met: [] Adjusted   3. Patient will demonstrate an increase in Strength to good proximal hip and core activation to allow for proper functional mobility as indicated by patients Functional Deficits. [x] Progressing: [] Met: [] Not Met: [] Adjusted   4. Patient will return to walking 2 + miles without increased symptoms or restriction. [] Progressing: [x] Met: [] Not Met: [] Adjusted   5. Patient will demonstrate appropriate mechanics with bending forward to pick grand child up from ground without cue for compensation and glute engagement. [] Progressing: [x] Met: [] Not Met: [] Adjusted       Overall Progression Towards Functional goals/ Treatment Progress Update:  [x] Patient is progressing as expected towards functional goals listed. [] Progression is slowed due to complexities/Impairments listed. [] Progression has been slowed due to co-morbidities. [] Plan just implemented, too soon to assess goals progression <30days   [] Goals require adjustment due to lack of progress  [] Patient is not progressing as expected and requires additional follow up with physician  [] Other    Prognosis for POC: [x] Good [] Fair  [] Poor      Patient requires continued skilled intervention: [x] Yes  [] No      ASSESSMENT:  Pt tolerated therex progression well today without any complaints of increased pain. She states her back has not been bothering her as she was able to tolerate a full 3 hour painting session with no complaints of pain.   At this time her hamstrings bilaterally seem to be causing her the most disability as she describes her pain upon waking as 8/10 when she attempts to get up from after a full nights rest.  She states that this occurs whenever her legs have been straight for a prolonged period of time, or when she is sitting on a chair where it directly compresses her hamstrings. Also, discussed the possible use of an assistive device to increase her overall activity tolerance as she expressed some concern with going to [employer] football games this season. Pt will benefit from continued skilled PT to increase core stability and strength, functional mobility to return to PLOF and walking. Treatment/Activity Tolerance:  [x] Patient tolerated treatment well [] Patient limited by fatigue  [] Patient limited by pain  [] Patient limited by other medical complications  [] Other:       PLAN: 1 x week up to 12 weeks 11/10/2021  [x] Continue per plan of care [] Alter current plan (see comments above)  [] Plan of care initiated [] Hold pending MD visit [] Discharge      Electronically signed by:  Chioma Franco, PT , DPT [id number]      Note: If patient does not return for scheduled/ recommended follow up visits, this note will serve as a discharge from care along with most recent update on progress.

## 2021-09-15 ENCOUNTER — HOSPITAL ENCOUNTER (OUTPATIENT)
Dept: PHYSICAL THERAPY | Age: 66
Setting detail: THERAPIES SERIES
Discharge: HOME | End: 2021-09-15
Payer: MEDICARE

## 2021-09-15 PROCEDURE — 97110 THERAPEUTIC EXERCISES: CPT

## 2021-09-15 PROCEDURE — 97112 NEUROMUSCULAR REEDUCATION: CPT

## 2021-09-15 PROCEDURE — 97140 MANUAL THERAPY 1/> REGIONS: CPT

## 2021-09-15 NOTE — FLOWSHEET NOTE
[facility], [address] [place]  Phone: [phone]  Fax [phone]    Physical Therapy Progress Note/Daily Treatment Note  Date:  9/15/2021    Patient Name:  Jasmeet Guajardo    :  1955  MRN: [id number]  Restrictions/Precautions:    Physician Information:  Referring Practitioner: Dr. Makeda Guerrero  Medical/Treatment Diagnosis Information:  · Diagnosis: M54.5 (ICD-10-CM) - Lumbar spine pain; M48.062 (ICD-10-CM) - Spinal stenosis of lumbar region with neurogenic claudication; M54.16 (ICD-10-CM) - Right lumbar radiculitis  · Treatment Diagnosis: M54.5 Lumbar spine pain; M48.062  Spinal stenosis of lumbar region with neurogenic claudication; M54.16 Right lumbar radiculitis  [x] Conservative / [] Surgical - DOS:  Therapy Diagnosis/Practice Pattern:  Practice Pattern F: Spinal Disorders  Insurance/Certification information:  PT Insurance Information: /[employer]  Plan of care signed: [] YES  [] NO  Number of Comorbidities:  []0     []1-2    [x]3+  Date of Patient follow up with Physician:     Is this a Progress Report:     [x]  Yes  []  No        If Yes:  Date Range for reporting period:  Beginning 2021   Ending 2021    Progress report will be due (10 Rx or 30 days whichever is less):       Recertification will be due (POC Duration  / 90 days whichever is less): 11/10/2021      Latex Allergy:  [x]NO      []YES  Preferred Language for Healthcare:   [x]English       []other:    Visit # Insurance Allowable   [facility]  auth []no        []yes:       SUBJECTIVE: Pt reports she felt pretty good after last session. Goes to [place] for a few weeks next week. OBJECTIVE:    Observation:   Palpation:     Test used Initial score Current Score   Pain Summary VAS 0-5 2-8   Functional questionnaire Oswestry 24%  26%    ROM Lumbar Flexion Pain free Pain free    Lumbar Ext 80% P!  On R 100% painfree    Lumbar SB L/R 95% P! R>L Painfree    Lumbar rotation L/R 85% P! R>L Pain free 90%   Strength Hip ext 4 3+    ABD 4 3+ ! P/ no ! P on R    TrA       RESTRICTIONS/PRECAUTIONS: none    Exercises/Interventions:     Therapeutic Ex sets/reps Notes HEP   Prone hip ext knee bent  Prone press up  Prone HS curl 20 x   2x10     2#    LTR  BKFO  SKTC  Supine nerve glides with DF  SB DKTC  SB LTR     15 x        X   Bridges   Marching with band  Clamshells  LAQ with HS curl seated 2 x 15  2 x 12  2 x 1220 x greenGreen  Green  Green  X   Standing hip ext green  Standing hip flex green  LBW green  X   Mini squats 30 xCues for posterior shift X   CC walkouts FWD/BKWD/lateral 25#20#10#    Heel raises  30x     HS curl machine 2 x 10 25#         Sit to stand  2 x10    Bike 10 min     Manual Intervention       Joint mobs grade 1-2      STM lumbar paraspinals, piriformis/glutes, HS 12 min SIJ R                                        NMR re-education                                       Therapeutic Exercise and NMR EXR  [x] (90912) Provided verbal/tactile cueing for activities related to strengthening, flexibility, endurance, ROM  for improvements in proximal hip and core control with self care, mobility, lifting and ambulation.  [] (16469) Provided verbal/tactile cueing for activities related to improving balance, coordination, kinesthetic sense, posture, motor skill, proprioception  to assist with core control in self care, mobility, lifting, and ambulation.      Therapeutic Activities:    [] (04854 or 39579) Provided verbal/tactile cueing for activities related to improving balance, coordination, kinesthetic sense, posture, motor skill, proprioception and motor activation to allow for proper function  with self care and ADLs  [] (75990) Provided training and instruction to the patient for proper core and proximal hip recruitment and positioning with ambulation re-education     Home Exercise Program:    [x] (76916) Reviewed/Progressed HEP activities related to strengthening, flexibility, endurance, ROM of core, proximal hip and LE for functional self-care, mobility, lifting and ambulation   [] (88333) Reviewed/Progressed HEP activities related to improving balance, coordination, kinesthetic sense, posture, motor skill, proprioception of core, proximal hip and LE for self care, mobility, lifting, and ambulation      Manual Treatments:  PROM / STM / Oscillations-Mobs:  G-I, II, III, IV (PA's, Inf., Post.)  [x] (57977) Provided manual therapy to mobilize proximal hip and LS spine soft tissue/joints for the purpose of modulating pain, promoting relaxation,  increasing ROM, reducing/eliminating soft tissue swelling/inflammation/restriction, improving soft tissue extensibility and allowing for proper ROM for normal function with self care, mobility, lifting and ambulation. Modalities:      [] GR/ESU 15 min    [] GR 15 min  [] ESU     [] CP    [] MHP    [] declined  Charges:  Timed Code Treatment Minutes: 40   Total Treatment Minutes: 40     [] EVAL (LOW) 25197 (typically 20 minutes face-to-face)  [] EVAL (MOD) 04623 (typically 30 minutes face-to-face)  [] EVAL (HIGH) 40369 (typically 45 minutes face-to-face)  [] RE-EVAL     [x] SY(49758) x 1  [] IONTO  [x] NMR (43886) x 1  [] VASO  [x] Manual (92723) x 1     [] Other:  [] TA x      [] Mech Traction (88850)  [] ES(attended) (28428)      [] ES (un) (86830):     Goals:Patient stated goal: Pain free and normal walking   [] Progressing: [x] Met: [] Not Met: [] Adjusted     Therapist goals for Patient:   Short Term Goals: To be achieved in: 2 weeks  1. Independent in HEP and progression per patient tolerance, in order to prevent re-injury. [] Progressing: [x] Met: [] Not Met: [] Adjusted   2. Patient will have a decrease in pain to facilitate improvement in movement, function, and ADLs as indicated by Functional Deficits. [] Progressing: [x] Met: [] Not Met: [] Adjusted     Long Term Goals:  To be achieved in: 12 weeks  1. Disability index score of 12% or less for the oswestry  to assist with reaching prior level of function. [x] Progressing: [] Met: [] Not Met: [] Adjusted   2. Patient will demonstrate increased AROM to WNL, good LS mobility, good hip ROM to allow for proper joint functioning as indicated by patients Functional Deficits. [] Progressing: [x] Met: [] Not Met: [] Adjusted   3. Patient will demonstrate an increase in Strength to good proximal hip and core activation to allow for proper functional mobility as indicated by patients Functional Deficits. [x] Progressing: [] Met: [] Not Met: [] Adjusted   4. Patient will return to walking 2 + miles without increased symptoms or restriction. [] Progressing: [x] Met: [] Not Met: [] Adjusted   5. Patient will demonstrate appropriate mechanics with bending forward to pick grand child up from ground without cue for compensation and glute engagement. [] Progressing: [x] Met: [] Not Met: [] Adjusted       Overall Progression Towards Functional goals/ Treatment Progress Update:  [x] Patient is progressing as expected towards functional goals listed. [] Progression is slowed due to complexities/Impairments listed. [] Progression has been slowed due to co-morbidities. [] Plan just implemented, too soon to assess goals progression <30days   [] Goals require adjustment due to lack of progress  [] Patient is not progressing as expected and requires additional follow up with physician  [] Other    Prognosis for POC: [x] Good [] Fair  [] Poor      Patient requires continued skilled intervention: [x] Yes  [] No      ASSESSMENT:  Pt tolerated therex progression well today without any complaints of increased pain. She states her back has not been bothering her as she was able to tolerate a full 3 hour painting session with no complaints of pain.   At this time her hamstrings bilaterally seem to be causing her the most disability as she describes her pain upon waking as 8/10 when she attempts to get up from after a full nights rest.  She states that this occurs whenever her legs have been straight for a prolonged period of time, or when she is sitting on a chair where it directly compresses her hamstrings. Also, discussed the possible use of an assistive device to increase her overall activity tolerance as she expressed some concern with going to [employer] football games this season. Pt will benefit from continued skilled PT to increase core stability and strength, functional mobility to return to PLOF and walking. Treatment/Activity Tolerance:  [x] Patient tolerated treatment well [] Patient limited by fatigue  [] Patient limited by pain  [] Patient limited by other medical complications  [] Other:       PLAN: 1 x week up to 12 weeks 11/10/2021  [x] Continue per plan of care [] Alter current plan (see comments above)  [] Plan of care initiated [] Hold pending MD visit [] Discharge      Electronically signed by:  Maverick Ramirez PT , DPT [id number]      Note: If patient does not return for scheduled/ recommended follow up visits, this note will serve as a discharge from care along with most recent update on progress.

## 2021-10-13 ENCOUNTER — HOSPITAL ENCOUNTER (OUTPATIENT)
Dept: PHYSICAL THERAPY | Age: 66
Setting detail: THERAPIES SERIES
Discharge: HOME | End: 2021-10-13
Payer: MEDICARE

## 2021-10-13 NOTE — FLOWSHEET NOTE
[facility],  [address]        Physical Therapy  Cancellation/No-show Note  Patient Name:  Carola Wilkinson  :  1955   Date:  10/13/2021  Cancelled visits to date: 2  No-shows to date: 0    For today's appointment patient:  [x]  Cancelled  []  Rescheduled appointment  []  No-show     Reason given by patient:  []  Patient ill  []  Conflicting appointment  []  No transportation    []  Conflict with work  []  No reason given  [x]  Other:     Comments:  Back and hip hurt    Electronically signed by:  Forest Patricia PT

## 2021-11-03 ENCOUNTER — HOSPITAL ENCOUNTER (OUTPATIENT)
Dept: PHYSICAL THERAPY | Age: 66
Setting detail: THERAPIES SERIES
Discharge: HOME | End: 2021-11-03
Payer: MEDICARE

## 2021-11-03 PROCEDURE — 97164 PT RE-EVAL EST PLAN CARE: CPT

## 2021-11-03 PROCEDURE — 97110 THERAPEUTIC EXERCISES: CPT

## 2021-11-03 NOTE — DISCHARGE SUMMARY
[facility], [address]  Phone: [phone]  Fax [phone]    Physical Therapy Progress Note/Daily Treatment Note  Date:  11/3/2021    Patient Name:  Alex Diez    :  1955  MRN: [id number]  Restrictions/Precautions:    Physician Information:  Referring Practitioner: Dr. Christina Wayne  Medical/Treatment Diagnosis Information:  · Diagnosis: M54.5 (ICD-10-CM) - Lumbar spine pain; M48.062 (ICD-10-CM) - Spinal stenosis of lumbar region with neurogenic claudication; M54.16 (ICD-10-CM) - Right lumbar radiculitis  · Treatment Diagnosis: M54.5 Lumbar spine pain; M48.062  Spinal stenosis of lumbar region with neurogenic claudication; M54.16 Right lumbar radiculitis  [x] Conservative / [] Surgical - DOS:  Therapy Diagnosis/Practice Pattern:  Practice Pattern F: Spinal Disorders  Insurance/Certification information:  [facility]  Plan of care signed: [] YES  [] NO  Number of Comorbidities:  []0     []1-2    [x]3+  Date of Patient follow up with Physician:     Is this a Progress Report:     [x]  Yes  []  No        If Yes:  Date Range for reporting period:  Beginning 2021   Ending 2021    Progress report will be due (10 Rx or 30 days whichever is less):       Recertification will be due (POC Duration  / 90 days whichever is less): 11/10/2021      Latex Allergy:  [x]NO      []YES  Preferred Language for Healthcare:   [x]English       []other:    Visit # Insurance Allowable    [facility]  auth []no        []yes:       SUBJECTIVE: Pt reports she has had a lot going on recently. Was out of town, threw back out,  had surgery, threw back out again. Feels the throwing out her back was with twisting motions and then with sitting on couch and leaning forward. Legs have been feeling better. Feels she is walking better too at this point.  Not as bad of pain in the morning, feels leaning kinesthetic sense, posture, motor skill, proprioception  to assist with core control in self care, mobility, lifting, and ambulation. Therapeutic Activities:    [] (59366 or 08929) Provided verbal/tactile cueing for activities related to improving balance, coordination, kinesthetic sense, posture, motor skill, proprioception and motor activation to allow for proper function  with self care and ADLs  [] (63860) Provided training and instruction to the patient for proper core and proximal hip recruitment and positioning with ambulation re-education     Home Exercise Program:    [x] (92093) Reviewed/Progressed HEP activities related to strengthening, flexibility, endurance, ROM of core, proximal hip and LE for functional self-care, mobility, lifting and ambulation   [] (98370) Reviewed/Progressed HEP activities related to improving balance, coordination, kinesthetic sense, posture, motor skill, proprioception of core, proximal hip and LE for self care, mobility, lifting, and ambulation      Manual Treatments:  PROM / STM / Oscillations-Mobs:  G-I, II, III, IV (PA's, Inf., Post.)  [x] (46000) Provided manual therapy to mobilize proximal hip and LS spine soft tissue/joints for the purpose of modulating pain, promoting relaxation,  increasing ROM, reducing/eliminating soft tissue swelling/inflammation/restriction, improving soft tissue extensibility and allowing for proper ROM for normal function with self care, mobility, lifting and ambulation.      Modalities:      [] GR/ESU 15 min    [] GR 15 min  [] ESU     [] CP    [] MHP    [] declined  Charges:  Timed Code Treatment Minutes: 40   Total Treatment Minutes: 40     [] EVAL (LOW) 39810 (typically 20 minutes face-to-face)  [] EVAL (MOD) 10128 (typically 30 minutes face-to-face)  [] EVAL (HIGH) 04984 (typically 45 minutes face-to-face)  [x] RE-EVAL     [x] NB(51091) x 2  [] IONTO  [] NMR (41877) x 1  [] VASO  [] Manual (97180) x 1     [] Other:  [] TA x      [] [facility] Traction (22953)  [] ES(attended) (86207)      [] ES (un) (50486):     Goals:Patient stated goal: Pain free and normal walking   [] Progressing: [x] Met: [] Not Met: [] Adjusted     Therapist goals for Patient:   Short Term Goals: To be achieved in: 2 weeks  1. Independent in HEP and progression per patient tolerance, in order to prevent re-injury. [] Progressing: [x] Met: [] Not Met: [] Adjusted   2. Patient will have a decrease in pain to facilitate improvement in movement, function, and ADLs as indicated by Functional Deficits. [] Progressing: [x] Met: [] Not Met: [] Adjusted     Long Term Goals: To be achieved in: 12 weeks  1. Disability index score of 12% or less for the oswestry  to assist with reaching prior level of function. [x] Progressing: [] Met: [] Not Met: [] Adjusted   2. Patient will demonstrate increased AROM to WNL, good LS mobility, good hip ROM to allow for proper joint functioning as indicated by patients Functional Deficits. [] Progressing: [x] Met: [] Not Met: [] Adjusted   3. Patient will demonstrate an increase in Strength to good proximal hip and core activation to allow for proper functional mobility as indicated by patients Functional Deficits. [x] Progressing: [] Met: [] Not Met: [] Adjusted   4. Patient will return to walking 2 + miles without increased symptoms or restriction. [] Progressing: [x] Met: [] Not Met: [] Adjusted   5. Patient will demonstrate appropriate mechanics with bending forward to pick grand child up from ground without cue for compensation and glute engagement. [] Progressing: [x] Met: [] Not Met: [] Adjusted       Overall Progression Towards Functional goals/ Treatment Progress Update:  [x] Patient is progressing as expected towards functional goals listed. [] Progression is slowed due to complexities/Impairments listed. [] Progression has been slowed due to co-morbidities.   [] Plan just implemented, too soon to assess goals progression <30days   [] Goals require adjustment due to lack of progress  [] Patient is not progressing as expected and requires additional follow up with physician  [] Other    Prognosis for POC: [x] Good [] Fair  [] Poor      Patient requires continued skilled intervention: [x] Yes  [] No      ASSESSMENT:  Pt tolerated therex progression well today without any complaints of increased pain. She states her back has not been bothering her as she was able to tolerate a full 3 hour painting session with no complaints of pain. At this time her hamstrings bilaterally seem to be causing her the most disability as she describes her pain upon waking as 8/10 when she attempts to get up from after a full nights rest.  She states that this occurs whenever her legs have been straight for a prolonged period of time, or when she is sitting on a chair where it directly compresses her hamstrings. Also, discussed the possible use of an assistive device to increase her overall activity tolerance as she expressed some concern with going to [employer] football games this season. Pt will benefit from continued skilled PT to increase core stability and strength, functional mobility to return to PLOF and walking. Treatment/Activity Tolerance:  [x] Patient tolerated treatment well [] Patient limited by fatigue  [] Patient limited by pain  [] Patient limited by other medical complications  [] Other:       PLAN: 1 x week up to 12 weeks 11/10/2021  [x] Continue per plan of care [] Alter current plan (see comments above)  [] Plan of care initiated [] Hold pending MD visit [] Discharge      Electronically signed by:  Parul Randhawa, PT , DPT [id number]      Note: If patient does not return for scheduled/ recommended follow up visits, this note will serve as a discharge from care along with most recent update on progress.

## 2021-11-04 DIAGNOSIS — I10 ESSENTIAL HYPERTENSION: ICD-10-CM

## 2021-11-05 RX ORDER — LISINOPRIL 40 MG/1
40 TABLET ORAL DAILY
Qty: 90 TABLET | Refills: 1 | Status: SHIPPED | OUTPATIENT
Start: 2021-11-05 | End: 2022-04-25

## 2021-11-10 ENCOUNTER — APPOINTMENT (OUTPATIENT)
Dept: PHYSICAL THERAPY | Age: 66
End: 2021-11-10
Payer: MEDICARE

## 2021-11-17 ENCOUNTER — TELEPHONE (OUTPATIENT)
Dept: FAMILY MEDICINE CLINIC | Age: 66
End: 2021-11-17

## 2021-11-17 ENCOUNTER — APPOINTMENT (OUTPATIENT)
Dept: PHYSICAL THERAPY | Age: 66
End: 2021-11-17
Payer: MEDICARE

## 2021-11-17 NOTE — TELEPHONE ENCOUNTER
----- Message from Tete Valencia sent at 11/17/2021  1:34 PM EST -----  Subject: Message to Provider    QUESTIONS  Information for Provider? Pt called and stated that she would like for the   Dr to call her regarding the metformin that she prescribed for her. Pt   states that she has a few questions that she needs to ask her. Please call   the pt back. ---------------------------------------------------------------------------  --------------  [employer] INFO  What is the best way for the office to contact you? OK to leave message on   voicemail  Preferred Call Back Phone Number? [phone]  ---------------------------------------------------------------------------  --------------  SCRIPT ANSWERS  Relationship to Patient?  Self

## 2021-11-17 NOTE — TELEPHONE ENCOUNTER
Pt states that she did not  the metformin in May when it was Rx'd. She has not done anything to change her ways and now states that she feels she needs to start making changes and \"get this under control\". I advised her to contact the pharmacy to see if they still had the script on file where she could p/u. She will do that, but states she would not start taking until Monday due to possible GI upset and already had plans this weekend. I also informed pt that Phylicia Feng would want to do an appointment, I just did not know when. It could be now or it could be in 2-4 weeks or so. Please contact pt with appointment information for when dejah will want to see her. Pt also stated that she did not want to test her BS daily. Pt is aware that she will hear from willie or dejah Thursday.

## 2021-12-07 ENCOUNTER — OFFICE VISIT (OUTPATIENT)
Dept: FAMILY MEDICINE CLINIC | Age: 66
End: 2021-12-07
Payer: MEDICARE

## 2021-12-07 VITALS
HEART RATE: 119 BPM | BODY MASS INDEX: 33.45 KG/M2 | OXYGEN SATURATION: 97 % | DIASTOLIC BLOOD PRESSURE: 82 MMHG | SYSTOLIC BLOOD PRESSURE: 160 MMHG | WEIGHT: 220 LBS

## 2021-12-07 DIAGNOSIS — I10 ESSENTIAL HYPERTENSION: ICD-10-CM

## 2021-12-07 DIAGNOSIS — E11.65 TYPE 2 DIABETES MELLITUS WITH HYPERGLYCEMIA, WITHOUT LONG-TERM CURRENT USE OF INSULIN (HCC): Primary | ICD-10-CM

## 2021-12-07 PROBLEM — Z91.199 NONCOMPLIANCE BY REFUSING INTERVENTION OR SUPPORT: Status: RESOLVED | Noted: 2017-11-20 | Resolved: 2021-12-07

## 2021-12-07 LAB — HBA1C MFR BLD: 9.2 %

## 2021-12-07 PROCEDURE — G8400 PT W/DXA NO RESULTS DOC: HCPCS | Performed by: NURSE PRACTITIONER

## 2021-12-07 PROCEDURE — 3017F COLORECTAL CA SCREEN DOC REV: CPT | Performed by: NURSE PRACTITIONER

## 2021-12-07 PROCEDURE — G8417 CALC BMI ABV UP PARAM F/U: HCPCS | Performed by: NURSE PRACTITIONER

## 2021-12-07 PROCEDURE — G8427 DOCREV CUR MEDS BY ELIG CLIN: HCPCS | Performed by: NURSE PRACTITIONER

## 2021-12-07 PROCEDURE — 99214 OFFICE O/P EST MOD 30 MIN: CPT | Performed by: NURSE PRACTITIONER

## 2021-12-07 PROCEDURE — 4040F PNEUMOC VAC/ADMIN/RCVD: CPT | Performed by: NURSE PRACTITIONER

## 2021-12-07 PROCEDURE — 2022F DILAT RTA XM EVC RTNOPTHY: CPT | Performed by: NURSE PRACTITIONER

## 2021-12-07 PROCEDURE — 1123F ACP DISCUSS/DSCN MKR DOCD: CPT | Performed by: NURSE PRACTITIONER

## 2021-12-07 PROCEDURE — 1036F TOBACCO NON-USER: CPT | Performed by: NURSE PRACTITIONER

## 2021-12-07 PROCEDURE — 83036 HEMOGLOBIN GLYCOSYLATED A1C: CPT | Performed by: NURSE PRACTITIONER

## 2021-12-07 PROCEDURE — 3046F HEMOGLOBIN A1C LEVEL >9.0%: CPT | Performed by: NURSE PRACTITIONER

## 2021-12-07 PROCEDURE — 1090F PRES/ABSN URINE INCON ASSESS: CPT | Performed by: NURSE PRACTITIONER

## 2021-12-07 PROCEDURE — G8484 FLU IMMUNIZE NO ADMIN: HCPCS | Performed by: NURSE PRACTITIONER

## 2021-12-07 RX ORDER — FLUCONAZOLE 150 MG/1
TABLET ORAL
Qty: 6 TABLET | Refills: 3 | Status: SHIPPED | OUTPATIENT
Start: 2021-12-07

## 2021-12-07 RX ORDER — BLOOD SUGAR DIAGNOSTIC
1 STRIP MISCELLANEOUS 2 TIMES DAILY
Qty: 100 EACH | Refills: 3 | Status: SHIPPED | OUTPATIENT
Start: 2021-12-07 | End: 2022-08-31

## 2021-12-07 RX ORDER — BLOOD-GLUCOSE METER
1 KIT MISCELLANEOUS DAILY
Qty: 1 KIT | Refills: 0 | Status: SHIPPED | OUTPATIENT
Start: 2021-12-07 | End: 2022-08-31

## 2021-12-07 RX ORDER — HYDROCHLOROTHIAZIDE 12.5 MG/1
12.5 CAPSULE, GELATIN COATED ORAL DAILY
Qty: 30 CAPSULE | Refills: 3 | Status: SHIPPED | OUTPATIENT
Start: 2021-12-07 | End: 2022-08-31

## 2021-12-07 RX ORDER — IBUPROFEN 600 MG/1
TABLET ORAL
COMMUNITY
Start: 2021-10-02

## 2021-12-07 RX ORDER — LANCETS 28 GAUGE
1 EACH MISCELLANEOUS 2 TIMES DAILY
Qty: 100 EACH | Refills: 3 | Status: SHIPPED | OUTPATIENT
Start: 2021-12-07 | End: 2022-08-31

## 2021-12-07 ASSESSMENT — ENCOUNTER SYMPTOMS
SHORTNESS OF BREATH: 0
WHEEZING: 0

## 2021-12-07 NOTE — PROGRESS NOTES
Patient: Saad Peck is a 74 y.o. female who presents today with the following Chief Complaint(s):  Chief Complaint   Patient presents with    Diabetes         HPI   HTN:BP is elevated- will add HCTZ 12.5 mg to lisinopril 40mg   DM: A1C is elevated more- did not start metformin last year- will start metformin and farxiga. Current Outpatient Medications   Medication Sig Dispense Refill    hydroCHLOROthiazide (MICROZIDE) 12.5 MG capsule Take 1 capsule by mouth daily 30 capsule 3    metFORMIN (GLUCOPHAGE) 500 MG tablet Take 1 tablet by mouth 2 times daily (with meals) 60 tablet 5    dapagliflozin (FARXIGA) 10 MG tablet Take 1 tablet by mouth every morning 90 tablet 1    glucose monitoring (FREESTYLE FREEDOM) kit 1 kit by Does not apply route daily 1 kit 0    FreeStyle Lancets MISC 1 each by Does not apply route 2 times daily 100 each 3    blood glucose test strips (EXACTECH TEST) strip 1 each by In Vitro route 2 times daily As needed. 100 each 3    lisinopril (PRINIVIL;ZESTRIL) 40 MG tablet TAKE 1 TABLET BY MOUTH  DAILY 90 tablet 1    ibuprofen (ADVIL;MOTRIN) 600 MG tablet        No current facility-administered medications for this visit. Patient's past medical history, surgical history, family history, medications,  andallergies  were all reviewed and updated as appropriate today. Review of Systems   Constitutional: Negative for chills and fever. Eyes: Negative for visual disturbance. Respiratory: Negative for shortness of breath and wheezing. Cardiovascular: Negative for chest pain and palpitations. Genitourinary: Negative for dysuria, frequency and urgency. Neurological: Negative for numbness. Physical Exam  Vitals and nursing note reviewed. Constitutional:       Appearance: Normal appearance. She is well-developed. HENT:      Head: Normocephalic and atraumatic.       Right Ear: External ear normal.      Left Ear: External ear normal.      Nose: Nose normal. Mouth/Throat:      Pharynx: No oropharyngeal exudate or posterior oropharyngeal erythema. Eyes:      Conjunctiva/sclera: Conjunctivae normal.   Cardiovascular:      Rate and Rhythm: Normal rate and regular rhythm. Heart sounds: Normal heart sounds. No murmur heard. Pulmonary:      Effort: Pulmonary effort is normal. No respiratory distress. Breath sounds: Normal breath sounds. No wheezing or rales. Musculoskeletal:         General: Normal range of motion. Cervical back: Normal range of motion and neck supple. Lymphadenopathy:      Cervical: No cervical adenopathy. Skin:     General: Skin is warm and dry. Neurological:      General: No focal deficit present. Mental Status: She is alert and oriented to person, place, and time. Deep Tendon Reflexes: Reflexes are normal and symmetric. Psychiatric:         Mood and Affect: Mood normal.         Behavior: Behavior normal.         Thought Content: Thought content normal.         Judgment: Judgment normal.       Vitals:    12/07/21 1328   BP: (!) 160/82   Pulse:    SpO2:        Assessment:  Encounter Diagnoses   Name Primary?  Essential hypertension     Type 2 diabetes mellitus with hyperglycemia, without long-term current use of insulin ([facility] Yes       Plan:  1. Type 2 diabetes mellitus without complication, without long-term current use of insulin ([facility])  Gave diet information and discussed it with patient  - POCT glycosylated hemoglobin (Hb A1C) 9.2 today  - metFORMIN (GLUCOPHAGE) 500 MG tablet; Take 1 tablet by mouth 2 times daily (with meals)  Dispense: 60 tablet; Refill: 5  - dapagliflozin (FARXIGA) 10 MG tablet; Take 1 tablet by mouth every morning  Dispense: 90 tablet; Refill: 1  Sent in testing supplies  Needs to check fasting(first thing in the morning) and 2 hours after a meal- log given to patient to fill in and bring back to next visit in 1 month   Will be in [place] until mid-January  2.  Essential hypertension  Add to lisinopril 40mg   - hydroCHLOROthiazide (MICROZIDE) 12.5 MG capsule; Take 1 capsule by mouth daily  Dispense: 30 capsule; Refill: 3        No follow-ups on file.

## 2022-04-23 DIAGNOSIS — I10 ESSENTIAL HYPERTENSION: ICD-10-CM

## 2022-04-25 RX ORDER — LISINOPRIL 40 MG/1
40 TABLET ORAL DAILY
Qty: 90 TABLET | Refills: 3 | Status: SHIPPED | OUTPATIENT
Start: 2022-04-25

## 2022-05-04 ENCOUNTER — TELEPHONE (OUTPATIENT)
Dept: ORTHOPEDIC SURGERY | Age: 67
End: 2022-05-04

## 2022-05-04 NOTE — TELEPHONE ENCOUNTER
Subjective  Chief Complaint   Patient presents with   • Establish Care     ?  HPI: Mr. Michele Bello is a 39 year old man presenting with     #GERD: Well controlled with omeprazole. Patient takes on a prn basis and requesting a refill. States that he has tried OTC zantac before with inadequate relief.     #Nicotine dependence: Patient is interested in quitting. Has tried the patch before unsuccessfully, however does not think he used the patch long enough. Also, he was smoking while he was using the patch and thinks this sabotaged the effort.     #ADHD: Patient has a history of ADHD in childhood; took adderall when younger. Does not think he wants to take medication at this time, but wondering if there is any cognitive therapy that could benefit him.     ROS: A complete 10 point review of systems was done and is negative except for pertinent positives listed above in HPI.       Recent PHQ 2/9 Score    PHQ 2:  Date PHQ 2 Score   3/16/2018 1       PHQ 9:     ?  PMH/PSH:  Past Medical History:   Diagnosis Date   • ADHD     childhood   • Closed right ankle fracture 1998   • Esophageal reflux 2006   • Hemorrhoid        Past Surgical History:   Procedure Laterality Date   • Can't find surg/proc      NONE       Medications:  Current Outpatient Prescriptions   Medication Sig Dispense Refill   • Omeprazole (PRILOSEC PO)        No current facility-administered medications for this visit.      ?  Allergies:  ALLERGIES:  No Known Allergies  ?  FH:  Family History   Problem Relation Age of Onset   • Cancer, Lung Father    • Diabetes Mother 63   • Hypertension Mother    • Pneumonia Mother    • Diabetes Brother      ?  SH:  Social History     Social History   • Marital status:      Spouse name: N/A   • Number of children: 2   • Years of education: 14     Occupational History   •  [employer]     Social History Main Topics   • Smoking status: Current Every Day Smoker     Packs/day: 0.10      The patient has made an appt and she has other questions and is wondering if she could get a call back.   Please call Years: 15.00     Types: Cigarettes   • Smokeless tobacco: Never Used      Comment: 2-3 cigarettes per day   • Alcohol use 0.0 oz/week      Comment: 0-2/day   • Drug use: No   • Sexual activity: Not on file     Other Topics Concern   • Special Diet No   • Exercise Yes     [facility], cardio, strength training     Social History Narrative   • No narrative on file     ?  ________________________________________________________________  Objective  ?  Physical Exam  Vitals  Vitals:    03/16/18 1605 03/16/18 1630   BP: 120/90 140/90   Pulse: 81    Resp: 16    SpO2: 98%    Weight: 98.3 kg    Height: 5' 9\" (1.753 m)      General: Well-developed, well-nourished, no apparent distres  HEENT: Normocephalic, atraumatic, anicteric sclerae, pupils equal/round/reactive to light, oropharynx clear with no exudate, tympanic membranes intact  Neck: Symmetric, no visible masses, no thyromegaly  Cardiac: Regular rate and rhythm, normal S1/S2, no murmurs/rubs/gallops, normal JVP  Pulm: Normal respiratory effort without the use of accessory muscles, clear to auscultation bilaterally, no wheezes/rales/rhonchi  Abd: +bowel sounds, soft, nontender/non-distended, no hepatosplenomegaly  Extremities: No cyanosis, clubbing, or edema  Skin: No rash or induration appreciated, warm and well perfused  Msk: no midline spinal TTP, no joint effusions grossly appreciated  Neuro: Moving all extremities well, CN II-XII grossly intact, normal gait  Psychiatric: A&Ox3, appropriate mood  ?  Labs/Imaging    Creatinine  Creatinine   Date Value Ref Range Status   10/11/1999 1.2 0.8 - 1.5 mg/dL Final       Cholesterol  CHOLESTEROL (mg/dL)   Date Value   10/11/1999 165     HDL (mg/dL)   Date Value   10/11/1999 47     No results found for: TRIGLYCERIDE  No results found for: CALCLDL    Liver panel  Lab Results   Component Value Date    ALBUMIN 4.7 10/11/1999    BILIRUBIN 0.7 10/11/1999    AST 31 10/11/1999    ALKPT 56 10/11/1999    TOTPROTEIN 7.3 10/11/1999        Hemoglobin A1C  No results found for: HGBA1C    PSA  No results found for: PSA    ________________________________________________________________  Assessment/Plan  Gastroesophageal reflux disease, esophagitis presence not specified  Well controlled on prn PPI.  - reviewed dietary triggers for GERD; patient could cut down on caffeine, alcohol, nicotine  - avoid overeating, eating close to bedtime  - continue omeprazole; reviewed long term risks with PPI vs H2 blocker use; patient not interested in H2 blocker at this time  - recommended annual creatinine check    Cigarette nicotine dependence without complication  - nicotine cessation counseling  - nicoderm patch trial    Elevated blood pressure reading without diagnosis of hypertension  - discussed elevated BP; patient plans to quit smoking and prioritize routine exercise in effort to bring BP down  - avoid excessive salt  - repeat BP check after lifestyle modification    H/o ADHD  - will call [facility] to clarify if cognitive therapy exists for ADHD (vs simply medication management)    Preventative Health  Heart: lipid check now (patient wants to check with insurance to find out cost), bp as above  AAA: age 63  Cancer screening:       Prostate - not on finasteride; discussed option of screening now - patient considering       Colon - age 48       Lung cancer - consider age 53  Immunizations: patient declines flu; tdap prescription provided    Outside providers?  none    Return in about 3 months (around 6/16/2018) for BP check as preferred by patient   ?  Medication list reviewed with patient. Changes updated and documented.   The significant potential side effects of the medications have been discussed.  Any questions answered.   Medical history updated.

## 2022-05-11 ENCOUNTER — OFFICE VISIT (OUTPATIENT)
Dept: ORTHOPEDIC SURGERY | Age: 67
End: 2022-05-11
Payer: MEDICARE

## 2022-05-11 VITALS — HEIGHT: 68 IN | BODY MASS INDEX: 31.07 KG/M2 | WEIGHT: 205 LBS

## 2022-05-11 DIAGNOSIS — M54.16 RIGHT LUMBAR RADICULITIS: ICD-10-CM

## 2022-05-11 DIAGNOSIS — M54.50 LUMBAR SPINE PAIN: Primary | ICD-10-CM

## 2022-05-11 DIAGNOSIS — M25.562 CHRONIC PAIN OF LEFT KNEE: ICD-10-CM

## 2022-05-11 DIAGNOSIS — G89.29 CHRONIC PAIN OF LEFT KNEE: ICD-10-CM

## 2022-05-11 DIAGNOSIS — M48.062 SPINAL STENOSIS OF LUMBAR REGION WITH NEUROGENIC CLAUDICATION: ICD-10-CM

## 2022-05-11 PROCEDURE — 1090F PRES/ABSN URINE INCON ASSESS: CPT | Performed by: FAMILY MEDICINE

## 2022-05-11 PROCEDURE — 1036F TOBACCO NON-USER: CPT | Performed by: FAMILY MEDICINE

## 2022-05-11 PROCEDURE — 1123F ACP DISCUSS/DSCN MKR DOCD: CPT | Performed by: FAMILY MEDICINE

## 2022-05-11 PROCEDURE — 4040F PNEUMOC VAC/ADMIN/RCVD: CPT | Performed by: FAMILY MEDICINE

## 2022-05-11 PROCEDURE — 99214 OFFICE O/P EST MOD 30 MIN: CPT | Performed by: FAMILY MEDICINE

## 2022-05-11 PROCEDURE — G8417 CALC BMI ABV UP PARAM F/U: HCPCS | Performed by: FAMILY MEDICINE

## 2022-05-11 PROCEDURE — G8427 DOCREV CUR MEDS BY ELIG CLIN: HCPCS | Performed by: FAMILY MEDICINE

## 2022-05-11 PROCEDURE — L1812 KO ELASTIC W/JOINTS PRE OTS: HCPCS | Performed by: FAMILY MEDICINE

## 2022-05-11 PROCEDURE — 3017F COLORECTAL CA SCREEN DOC REV: CPT | Performed by: FAMILY MEDICINE

## 2022-05-11 PROCEDURE — G8400 PT W/DXA NO RESULTS DOC: HCPCS | Performed by: FAMILY MEDICINE

## 2022-05-11 RX ORDER — METHYLPREDNISOLONE 4 MG/1
TABLET ORAL
Qty: 21 KIT | Refills: 0 | Status: SHIPPED | OUTPATIENT
Start: 2022-05-11

## 2022-05-11 RX ORDER — METFORMIN HYDROCHLORIDE 500 MG/1
TABLET, EXTENDED RELEASE ORAL
COMMUNITY
Start: 2022-05-07 | End: 2022-08-31

## 2022-05-11 RX ORDER — IBUPROFEN 800 MG/1
800 TABLET ORAL EVERY 8 HOURS PRN
Qty: 90 TABLET | Refills: 3 | Status: SHIPPED | OUTPATIENT
Start: 2022-05-11 | End: 2022-09-08

## 2022-05-11 NOTE — PROGRESS NOTES
Chief Complaint  Lower Back Pain (CK LUMBAR)      Reevaluation ongoing mechanical low back pain with recurrent left leg pain and history of radiculopathy    History of Present Illness:  Kat Arzola is a 69 y.o. female who is a very pleasant white female type II diabetic with last A1c 7.1 is a very nice patient of Cynthia Mcdowell CNP who is being seen today for evaluation of progressive pain mildly to the lower back with achy symptoms and limited ability to walk distances in her legs bilaterally. She states that she has been having episodic achiness to her legs with subjective weakness for about 3 years but states that over the past years become more pronounced that if she walks more than 100 yards she will have to stop secondary to some achiness to her legs. If she rests more than a minute or 2 it does improve to the point where she is able to resume her activities. There is no history of injury or trauma prior to becoming symptomatic although her mother did require a laminectomy at L4-5 secondary to radiculopathy. On questioning her last year she did have an episode of radiculitis which seem to be more right-sided in the L4 distribution but improved with relative rest.  She does not utilize any type of bracing and does deny neurogenic bowel or bladder symptoms. She does have some discomfort with prolonged sitting as well and has had occasional nighttime discomfort. She was seen for foot and ankle symptoms on the right last year which responded fairly well to rehabilitation but she had been having some posterior thigh and calf pain at that point as well. She has not had recent imaging. She seems to do better with Advil as opposed to the diclofenac. She does deny neurogenic bowel or bladder symptoms. Occasional nighttime discomfort noted. She is being seen today for orthopedic and sports consultation with imaging of her spine.     We initially saw Glory Hart for her back on 5/3/2021 just prior to her leaving for vacation to [place]. We had recommended that she get an updated MRI of her lumbar spine given her degrees of injury but she did temporarily improve with her Medrol pack and had quite a bit of physical therapy 19 sessions last year with some improvement of her symptoms. She has noticed since roughly February 2022 she is having recurrence of pain and was having pain radiating into the lower extremities but not near those degree that they were last year as she has been good about keeping up with her exercises. When she called to make this appointment a couple of weeks ago she was having considerable mechanical back pain worse on the left than right. She is known to have underlying lumbar spondylolisthesis and probably does have some chronic episodic radicular findings but has not yet had MRI of her lumbar spine. She did try to start back on her anti-inflammatories but seems to do better with ibuprofen and has been using her warm-and-form brace. She does deny neurogenic bowel or bladder symptoms and is having more axial pain left than right as opposed to high-grade radicular symptoms currently. Medical History  Patient's medications, allergies, past medical, surgical, social and family histories were reviewed and updated as appropriate. Review of Systems  Pertinent items are noted in HPI  Review of systems reviewed from Patient History Form dated on 5/3/2021 and available in the patient's chart under the Media tab. Vital Signs  There were no vitals filed for this visit. General Exam:     Constitutional: Patient is adequately groomed with no evidence of malnutrition  DTRs: Deep tendon reflexes are intact  Mental Status: The patient is oriented to time, place and person. The patient's mood and affect are appropriate. Lymphatic: The lymphatic examination bilaterally reveals all areas to be without enlargement or induration. Vascular: Examination reveals no swelling or calf tenderness. Peripheral pulses are palpable and 2+. Neurological: The patient has good coordination. There is no weakness or sensory deficit. Lumbar/Sacral Spine Examination  Inspection: There is no high-grade disc deformities or substantial scoliosis. Mild kyphosis noted. No palpable swelling or spasm. Palpation: She does have clinical tenderness over the right greater than left lumbar paraspinals and tenderness along lower lumbar facets. She does have some very mild gluteal tenderness right greater than left. Rang of Motion: She can forward flex to about 50. She does have painful extension which produces pain to the posterior thighs right slightly worse than left proximally. Strength: There is not appear to be focal lower extremity motor deficits. Special Tests: Pain with lumbar extension as noted above. Her straight leg raising does not appear to be overwhelmingly positive today. Screening hip testing relatively benign. Skin: There are no rashes, ulcerations or lesions. Distal motor sensory and vascular exams intact. Gait: Mild antalgia. Reflexes:  Symmetrically preserved but 3 out of 4 at the knees 4-4 at the ankles. Additional Comments:     Additional Examinations:  Right Lower Extremity: Examination of the right lower extremity does not show any tenderness, deformity or injury. Range of motion is unremarkable. There is no gross instability. There are no rashes, ulcerations or lesions. Strength and tone are normal.  Left Lower Extremity: Examination of the left lower extremity does not show any tenderness, deformity or injury. Range of motion is unremarkable. There is no gross instability. There are no rashes, ulcerations or lesions.   Strength and tone are normal.      Diagnostic Test Findings: AP and lateral lumbar spine films were reviewed from 5/3/2021 and does show multilevel lumbar degenerative disc changes with endplate spurring proximally with a grade 1 approaching grade 2 spondylolisthesis of L4-5. Assessment: #1. Chronic episodically symptomatic mechanical low back pain with lumbar degenerative disc disease and grade 1-2 L4-5 lumbar spondylolisthesis with likely spinal stenosis and history of right greater than left L5 radiculitis    Impression:  Encounter Diagnoses   Name Primary?  Lumbar spine pain Yes    Spinal stenosis of lumbar region with neurogenic claudication     Right lumbar radiculitis     Chronic pain of left knee        Office Procedures:  Orders Placed This Encounter   Procedures    MRI LUMBAR SPINE WO CONTRAST     Standing Status:   Future     Standing Expiration Date:   5/11/2023     Scheduling Instructions:      [address]      [address]      [phone]      Fax # [phone]     Order Specific Question:   Reason for exam:     Answer:   EVALUATE DDD, SPONDYLOTHESIS, R/O HNP L4-L5/FORAMINAL NARROWING     Order Specific Question:   What is the sedation requirement? Answer:   None    [address]     Referral Priority:   Routine     Referral Type:   Eval and Treat     Referral Reason:   Specialty Services Required     Referred to Provider:   Denys Denny PA-C     Requested Specialty:   Physician Assistant     Number of Visits Requested:   1    Breg Economy Hinged Knee WrapAround Brace     Patient was prescribed a Breg Economy Hinged Wrap Around Knee Brace. The left knee will require stabilization / immobilization from this semi-rigid / rigid orthosis to improve their function. The orthosis will assist in protecting the affected area, provide functional support and facilitate healing. The patient was educated and fit by a healthcare professional with expert knowledge and specialization in brace application while under the direct supervision of the treating physician.   Verbal and written instructions for the use of and application of this item were provided. They were instructed to contact the office immediately should the brace result in increased pain, decreased sensation, increased swelling or worsening of the condition. Treatment Plan:  Treatment options were discussed with Luis Reynoso. We did once again review of plain films and exam findings. She does have a significant listhesis at L4-5 and is complaining primarily of symptoms most suggestive of spinal stenosis of limited ability to walk distances and inclines. We had previously placed her in a warm-and-form belt and sent her once again for an MRI to evaluate for central and foraminal narrowing. We did place her in a Medrol Dosepak and she will monitor for transient hyperglycemia as she is now on metformin to control her sugars and we will change her to ibuprofen 800 mg 1 pill 3 times daily. We did give her Valium 10 mg to take 1 hour prior to her MRI as she does have a history of anxiety. I would like for her to continue with her therapy exercises that she had fairly extensive rehab for 19 sessions last year. I would like for her to follow-up with Inderj[facility] Scott post imaging given her plain film findings and suspected findings on her MRI. She is also been having some problems with her knees particular on the left and we will see her back specifically for this. As she does have an upcoming wedding shower, we did give her a brace for her left knee and we will see her back in the next week for formal evaluation of this. She will contact us sooner with questions or concerns. This dictation was performed with a verbal recognition program (DRAGON) and it was checked for errors. It is possible that there are still dictated errors within this office note. If so, please bring any errors to my attention for an addendum. All efforts were made to ensure that this office note is accurate.

## 2022-05-11 NOTE — PATIENT INSTRUCTIONS
BEGIN WEARING WARM AND FORM BACK BRACE    If you're currently taking an anti-inflammatory such as advil, aleve, ibuprofen, diclofenac, naproxen, meloxicam, celebrex, or nabumetone, please stop. Take Medrol first for 6 days. This is a steroid pack. Flip the package over to the foil side and the directions will tell you to start with 6 pills the first day, 5 pills the second day, etc. Please do not take any other anti-inflammatories with the medrol dose octavio as this can upset your stomach. If something else is needed, you may take extra strength tylenol.      Once you are finished with the medrol, then you may re-start or start your anti-inflammatory: IBUPROFEN 800 MG

## 2022-05-12 ENCOUNTER — TELEPHONE (OUTPATIENT)
Dept: FAMILY MEDICINE CLINIC | Age: 67
End: 2022-05-12

## 2022-05-12 DIAGNOSIS — E11.9 TYPE 2 DIABETES MELLITUS WITHOUT COMPLICATION, WITHOUT LONG-TERM CURRENT USE OF INSULIN (HCC): Primary | ICD-10-CM

## 2022-05-12 NOTE — TELEPHONE ENCOUNTER
Patient is requesting a order to have her A1C checked. She was advised an appointment.  She requested a message to go back to just have a lab test. Advise      Last seen 12/7/21

## 2022-08-25 ENCOUNTER — TELEPHONE (OUTPATIENT)
Dept: ORTHOPEDIC SURGERY | Age: 67
End: 2022-08-25

## 2022-08-25 NOTE — TELEPHONE ENCOUNTER
SPOKE TO PATIENT. SHE WAS UNABLE TO GET HER MRI SCHEDULED FOR HER LUMBAR SPINE BECAUSE SHE GOT COVID, THEN GOT SUPER SICK, ETC AND WAS DOWN FOR A WHILE. SHE THEN WENT TO [place] FOR A DURATION ONCE SHE WAS FEELING BETTER. SHE CONTINUES TO HAVE RIGHT SIDED LEG PAIN. SHE SAYS THE STEROID PACK DID HELP WITH THE BACK PAIN. HER KNEES ARE GIVING HER TROUBLE, LEFT MORE THAN RIGHT. WHEN DR. Kita Babinski HAD SEEN HER IN MAY FOR HER BACK, HE HAD SAID HE WANTED TO SEE HER BACK TO ADDRESS HER KNEES SPECIFICALLY AND THEN SHE GOT SICK. I EXPLAINED WE WOULD START WITH XRAYS OF HER KNEE AT HER APPT I MADE FOR NEXT WEEK. I RECOMMENDED RE-STARTING HER IBUPROFEN 800 MG 3X/DAY THAT DR. BEAULIEU HAD PRESCRIBED BACK IN MAY, ICE, HEAT AS TOLERATED, COMPRESSION, ETC. UNTIL HER APPT NEXT WEEK. PATIENT WAS IN AGREEMENT WITH PLAN. I ALSO SUGGESTED SCHEDULING HER LUMBAR MRI THAT DR. BEAULIEU HAD ORDERED BACK IN MAY SO WE HAVE MORE INFORMATION TO HELP WITH HER RIGHT LEG PAIN.

## 2022-08-25 NOTE — TELEPHONE ENCOUNTER
General Question     Subject: PT IS REQUESTING A RETURN CALL SO SHE CAN DISCUSS WHAT IS GOING ON WITH HER KNEE/BACK.   Patient and /or Facility Request: [address] [place] [address] Number: [phone]

## 2022-08-31 ENCOUNTER — OFFICE VISIT (OUTPATIENT)
Dept: ORTHOPEDIC SURGERY | Age: 67
End: 2022-08-31
Payer: MEDICARE

## 2022-08-31 VITALS — BODY MASS INDEX: 29.55 KG/M2 | WEIGHT: 195 LBS | HEIGHT: 68 IN

## 2022-08-31 DIAGNOSIS — M54.50 LUMBAR SPINE PAIN: ICD-10-CM

## 2022-08-31 DIAGNOSIS — M25.561 RIGHT KNEE PAIN, UNSPECIFIED CHRONICITY: Primary | ICD-10-CM

## 2022-08-31 DIAGNOSIS — M17.12 PRIMARY OSTEOARTHRITIS OF LEFT KNEE: ICD-10-CM

## 2022-08-31 DIAGNOSIS — M25.562 LEFT KNEE PAIN, UNSPECIFIED CHRONICITY: ICD-10-CM

## 2022-08-31 DIAGNOSIS — M54.16 RIGHT LUMBAR RADICULITIS: ICD-10-CM

## 2022-08-31 DIAGNOSIS — M22.42 CHONDROMALACIA OF LEFT PATELLA: ICD-10-CM

## 2022-08-31 DIAGNOSIS — M48.062 SPINAL STENOSIS OF LUMBAR REGION WITH NEUROGENIC CLAUDICATION: ICD-10-CM

## 2022-08-31 PROCEDURE — G8428 CUR MEDS NOT DOCUMENT: HCPCS | Performed by: FAMILY MEDICINE

## 2022-08-31 PROCEDURE — L1812 KO ELASTIC W/JOINTS PRE OTS: HCPCS | Performed by: FAMILY MEDICINE

## 2022-08-31 PROCEDURE — 3017F COLORECTAL CA SCREEN DOC REV: CPT | Performed by: FAMILY MEDICINE

## 2022-08-31 PROCEDURE — 99214 OFFICE O/P EST MOD 30 MIN: CPT | Performed by: FAMILY MEDICINE

## 2022-08-31 PROCEDURE — G8417 CALC BMI ABV UP PARAM F/U: HCPCS | Performed by: FAMILY MEDICINE

## 2022-08-31 PROCEDURE — G8400 PT W/DXA NO RESULTS DOC: HCPCS | Performed by: FAMILY MEDICINE

## 2022-08-31 PROCEDURE — 1090F PRES/ABSN URINE INCON ASSESS: CPT | Performed by: FAMILY MEDICINE

## 2022-08-31 PROCEDURE — 1123F ACP DISCUSS/DSCN MKR DOCD: CPT | Performed by: FAMILY MEDICINE

## 2022-08-31 PROCEDURE — 1036F TOBACCO NON-USER: CPT | Performed by: FAMILY MEDICINE

## 2022-08-31 RX ORDER — IBUPROFEN 800 MG/1
800 TABLET ORAL EVERY 8 HOURS PRN
Qty: 90 TABLET | Refills: 3 | Status: SHIPPED | OUTPATIENT
Start: 2022-08-31 | End: 2022-09-30

## 2022-08-31 NOTE — PROGRESS NOTES
Chief Complaint  Knee Pain (Bilateral knee pain)    Initial consultation persistent symptomatic left and right knee pain with difficulty walking distances and pivoting with occasional left knee subtle buckling    Reevaluation ongoing mechanical low back pain with recurrent left leg pain and history of radiculopathy    History of Present Illness:  Clarence Silvestre is a 69 y.o. female who is a very pleasant white female type II diabetic with last A1c 7.1 is a very nice patient of [facility] who is being seen today for evaluation of progressive pain mildly to the lower back with achy symptoms and limited ability to walk distances in her legs bilaterally. She states that she has been having episodic achiness to her legs with subjective weakness for about 3 years but states that over the past years become more pronounced that if she walks more than 100 yards she will have to stop secondary to some achiness to her legs. If she rests more than a minute or 2 it does improve to the point where she is able to resume her activities. There is no history of injury or trauma prior to becoming symptomatic although her mother did require a laminectomy at L4-5 secondary to radiculopathy. On questioning her last year she did have an episode of radiculitis which seem to be more right-sided in the L4 distribution but improved with relative rest.  She does not utilize any type of bracing and does deny neurogenic bowel or bladder symptoms. She does have some discomfort with prolonged sitting as well and has had occasional nighttime discomfort. She was seen for foot and ankle symptoms on the right last year which responded fairly well to rehabilitation but she had been having some posterior thigh and calf pain at that point as well. She has not had recent imaging. She seems to do better with Advil as opposed to the diclofenac. She does deny neurogenic bowel or bladder symptoms. Occasional nighttime discomfort noted.   She is being seen today for orthopedic and sports consultation with imaging of her spine. We initially saw Malena Nails for her back on 5/3/2021 just prior to her leaving for vacation to [place]. We had recommended that she get an updated MRI of her lumbar spine given her degrees of injury but she did temporarily improve with her Medrol pack and had quite a bit of physical therapy 19 sessions last year with some improvement of her symptoms. She has noticed since roughly February 2022 she is having recurrence of pain and was having pain radiating into the lower extremities but not near those degree that they were last year as she has been good about keeping up with her exercises. When she called to make this appointment a couple of weeks ago she was having considerable mechanical back pain worse on the left than right. She is known to have underlying lumbar spondylolisthesis and probably does have some chronic episodic radicular findings but has not yet had MRI of her lumbar spine. She did try to start back on her anti-inflammatories but seems to do better with ibuprofen and has been using her warm-and-form brace. She does deny neurogenic bowel or bladder symptoms and is having more axial pain left than right as opposed to high-grade radicular symptoms currently. We last saw Malena Nails in the office on 5/11/2022 when all of her treatment at that point had to do with her mechanical back pain and did have a significant listhesis at L4-5 on her plain films and was complaining of symptoms most suggestive of spinal stenosis and possibly a low-grade right L5 radiculopathy. She did improve with conservative treatment but we had discussed MRI imaging and had gotten approval for an MRI scan with the plans of having her see Rhonda Mahmood for additional treatment recommendations.   She did improve following extensive therapy at 19 sessions and apparently did have other medical issues going on for she never really did have her MRI or follow-up with Maty Moreno. She was doing well until they drove 16 hours to [place] early in the summer and since that time she has been having some gluteal discomfort more so on the right with some extension down the right leg in the L5 distribution without substantial groin pain. She still has her spinal stenosis symptoms and is continued with her ibuprofen 3 times daily and does occasionally utilize her brace and has been only fairly consistent with her lumbar exercise and stretching program.  Her greater complaint today is of discomfort to her left knee which is been an ongoing problem over the last several years. She does have an obvious varus deformity to the left knee and does have really only mild pain at 1-2 out of 10 with positional changes or if she walks distances or climb stairs and does have crepitation and popping. She has had some occasional pseudo buckling but no definitive locking or catching and has never really had dedicated treatment or bracing to the left knee. She is being seen today for evaluation of her back as well as right lateral hip and leg discomfort and new films of her left knee. Pain Assessment  Location of Pain: Knee  Location Modifiers: Left, Right  Severity of Pain: 6  Quality of Pain: Dull  Frequency of Pain: Intermittent  Aggravating Factors: Walking, Stairs  Limiting Behavior: Yes  Relieving Factors: Nsaids  Result of Injury: No  Work-Related Injury: No  Are there other pain locations you wish to document?: No       Medical History  Patient's medications, allergies, past medical, surgical, social and family histories were reviewed and updated as appropriate. Review of Systems  Pertinent items are noted in HPI  Review of systems reviewed from Patient History Form dated on 5/3/2021 and available in the patient's chart under the Media tab. Vital Signs  There were no vitals filed for this visit.     General Exam:     Constitutional: Patient is adequately groomed with no evidence of malnutrition  DTRs: Deep tendon reflexes are intact  Mental Status: The patient is oriented to time, place and person. The patient's mood and affect are appropriate. Lymphatic: The lymphatic examination bilaterally reveals all areas to be without enlargement or induration. Vascular: Examination reveals no swelling or calf tenderness. Peripheral pulses are palpable and 2+. Neurological: The patient has good coordination. There is no weakness or sensory deficit. Lumbar/Sacral Spine Examination  Inspection: There is no high-grade disc deformities or substantial scoliosis. Mild kyphosis noted. No palpable swelling or spasm. Palpation: She does have recurrent clinical tenderness over the right greater than left lumbar paraspinals and tenderness along lower lumbar facets. She does have some very mild gluteal tenderness right greater than left. Rang of Motion: She can forward flex to about 50. She does have only mildly painful extension which produces pain to the posterior thighs right slightly worse than left proximally and also discomfort into her right lateral hip and upper thigh. .      Strength: There is not appear to be focal lower extremity motor deficits. Special Tests: Pain with lumbar extension as noted above. Her straight leg raising does not appear to be overwhelmingly positive today. Screening hip testing relatively benign. Skin: There are no rashes, ulcerations or lesions. Distal motor sensory and vascular exams intact. Gait: Mild antalgia. Reflexes:  Symmetrically preserved but 3 out of 4 at the knees 4-4 at the ankles. Additional Comments: Evaluation of her left knee does reveal obvious varus deformity. She does have a trace knee joint effusion and tenderness over the medial and lateral patellofemoral facet diffuse medial joint line tenderness noted. She is stiff in the terminal 10 degrees of extension with flexion limited to about 110 today. Hamstrings are tight. Strength 4-5 with flexion and extension. Pain with patellar grind testing and some discomfort with Abdelrahman's remarkable for crepitation but no high-grade clicks and no obvious instability noted. Additional Examinations:  Right Lower Extremity: Examination of the right lower extremity does not show any tenderness, deformity or injury. Range of motion is unremarkable. There is no gross instability. There are no rashes, ulcerations or lesions. Strength and tone are normal.  Left Lower Extremity: Examination of the left lower extremity does not show any tenderness, deformity or injury. Range of motion is unremarkable. There is no gross instability. There are no rashes, ulcerations or lesions. Strength and tone are normal.      Diagnostic Test Findings: AP and lateral lumbar spine films were reviewed from 5/3/2021 and does show multilevel lumbar degenerative disc changes with endplate spurring proximally with a grade 1 approaching grade 2 spondylolisthesis of L4-5. Left knee AP and PA weightbearing sunrise and lateral films were obtained today and does show advanced bone-on-bone changes to the medial compartment of her left knee with subchondral sclerosis with significant patellofemoral bone spurring and tilt. Assessment: #1. Chronic episodically symptomatic mechanical low back pain with lumbar degenerative disc disease and grade 1-2 L4-5 lumbar spondylolisthesis with likely spinal stenosis and history of right greater than left L5 radiculitis  #2. Chronic symptomatic left knee advanced medial compartment osteoarthritis of bone-on-bone changes medial compartment with patellofemoral arthropathy and difficulty walking distances and climbing stairs    Impression:  Encounter Diagnoses   Name Primary?     Right knee pain, unspecified chronicity Yes    Left knee pain, unspecified chronicity     Primary osteoarthritis of left knee     Chondromalacia of left patella     Lumbar spine pain     Spinal stenosis of lumbar region with neurogenic claudication     Right lumbar radiculitis        Office Procedures:  Orders Placed This Encounter   Procedures    XR KNEE LEFT (MIN 4 VIEWS)     Standing Status:   Future     Number of Occurrences:   1     Standing Expiration Date:   8/31/2023    Ambulatory referral to Physical Therapy     Referral Priority:   Routine     Referral Type:   Eval and Treat     Referral Reason:   Specialty Services Required     Requested Specialty:   Physical Therapist     Number of Visits Requested:   1    Breg Economy Hinged Knee WrapAround Brace     Patient was prescribed a Breg Economy Hinged Wrap Around Knee Brace. The left knee will require stabilization / immobilization from this semi-rigid / rigid orthosis to improve their function. The orthosis will assist in protecting the affected area, provide functional support and facilitate healing. The patient was educated and fit by a healthcare professional with expert knowledge and specialization in brace application while under the direct supervision of the treating physician. Verbal and written instructions for the use of and application of this item were provided. They were instructed to contact the office immediately should the brace result in increased pain, decreased sensation, increased swelling or worsening of the condition. Treatment Plan:  Treatment options were discussed with [facility]. We did once again review of updated plain films and exam findings. She does have a significant listhesis at L4-5 and is complaining primarily of symptoms most suggestive of spinal stenosis of limited ability to walk distances and inclines and with initial treatment she did have a transient improvement but following a 16-hour car drive to [place] for vacation she is now having worsening pain suggestive of possible recurrent left L5 radiculopathy.   I encouraged her to get back into physical therapy for reemphasis of her home-based exercise program for spine and will continue with her warm-and-form belt. She never had her MRI performed, I would like for her to have a lumbar spine MRI to evaluate for spinal stenosis for both central and foraminal narrowing. She also does have advanced left knee osteoarthritis bone-on-bone changes medial compartment and fairly prominent spurring to the patellofemoral joint. She was placed in a warm-and-form belt. She will continue with her ibuprofen 800 mg 3 times daily. We may very well consider viscosupplementation for her left knee as she does plan on going to [place] around [place] time. It does not sound as if her primary care physician wants her to be on oral steroids or have injectable steroids and therefore we may consider viscosupplementation or potentially Alyssa Gama. I would like for her to follow directly with Judi Mistry post lumbar MRI and we will see her back in a week or 2 to recheck her knee and will restart therapy for VMO strengthening and hamstring flexibility. She will contact us in the interim with questions or concerns. .      This dictation was performed with a verbal recognition program (DRAGON) and it was checked for errors. It is possible that there are still dictated errors within this office note. If so, please bring any errors to my attention for an addendum. All efforts were made to ensure that this office note is accurate.

## 2022-09-07 ENCOUNTER — HOSPITAL ENCOUNTER (OUTPATIENT)
Dept: PHYSICAL THERAPY | Age: 67
Setting detail: THERAPIES SERIES
Discharge: HOME | End: 2022-09-07
Payer: MEDICARE

## 2022-09-07 PROCEDURE — 97161 PT EVAL LOW COMPLEX 20 MIN: CPT

## 2022-09-07 PROCEDURE — 97140 MANUAL THERAPY 1/> REGIONS: CPT

## 2022-09-07 PROCEDURE — 97110 THERAPEUTIC EXERCISES: CPT

## 2022-09-07 NOTE — FLOWSHEET NOTE
Mr Shantal Berumen was seated in the waiting room and stated he could not breath well. I did a portable o2 saturation and it was 99% on room air and his heart rate was 70. [facility],  [address]  Phone: [phone]  Fax [phone]    Physical Therapy Daily Treatment Note  Date:  2022    Patient Name:  Martha Vasquez    :  1955  MRN: [id number]  Restrictions/Precautions:    Physician Information:   Dr. Lisbeth Olmedo  Medical/Treatment Diagnosis Information:  Diagnosis: M17.12 Primary osteoarthritis of left knee;  Treatment Diagnosis: M25.562 Left knee pain; M54.50 Lumbar spine pain   [x] Conservative / [] Surgical - DOS:  Therapy Diagnosis/Practice Pattern:  Practice Pattern A: Primary Prevention  Insurance/Certification information:  PT Insurance Information: /[facility]  Plan of care signed: [] YES  [] NO  Number of Comorbidities:  []0     []1-2    [x]3+  Date of Patient follow up with Physician:     Is this a Progress Report:     []  Yes  [x]  No        If Yes:  Date Range for reporting period:  Beginning 2022  Ending     Progress report will be due (10 Rx or 30 days whichever is less):        Recertification will be due (POC Duration  / 90 days whichever is less): 2022      Latex Allergy:  [x]NO      []YES  Preferred Language for Healthcare:   [x]English       []other:    Visit # Insurance Allowable   1 BMN  auth []no        []yes:     SUBJECTIVE:  See eval    OBJECTIVE: See eval  Observation:  Palpation:    Test used Initial score Current Score   VAS     FOTO     flexion     extension     quad     ABD     flexion          RESTRICTIONS/PRECAUTIONS:     Exercises/Interventions:     Therapeutic Ex/NMR  Sets/reps Notes   HS floss 15 x 5\"    Piriformis stretch 3 x 15\"    LTR 15 x    3 way clam  Bridge with band 10 x  10 x    LAQ with HS band 10 x                                                 Pt ed anatomy, surgery, PT progression, prognosis 20 min TKR, pros/cons   Manual Intervention     PROM, gentle STM to R hip flexor, bilateral glutes/piriformis 8 min Access Code: [place]  URL: [email or web address]  Date: 09/07/2022  Prepared by: Zohra Vilchis    Exercises  Hamstring floss - 1-2 x daily - 7 x weekly - 10 reps  Supine Piriformis Stretch with Foot on Ground - 1-2 x daily - 7 x weekly - 3 reps - 30 hold  Hooklying Clamshell with Resistance - 1-2 x daily - 7 x weekly - 2-3 sets - 10 reps  Hooklying Isometric Clamshell - 1-2 x daily - 7 x weekly - 2-3 sets - 10 reps  Supine Bridge with Resistance Band - 1-2 x daily - 7 x weekly - 2-3 sets - 10 reps  Seated Knee Extension with Resistance - 1-2 x daily - 7 x weekly - 2-3 sets - 10 reps      Therapeutic Exercise and NMR EXR  [x] (75840) Provided verbal/tactile cueing for activities related to strengthening, flexibility, endurance, ROM for improvements in LE, proximal hip, and core control with self care, mobility, lifting, ambulation.  [] (27970) Provided verbal/tactile cueing for activities related to improving balance, coordination, kinesthetic sense, posture, motor skill, proprioception  to assist with LE, proximal hip, and core control in self care, mobility, lifting, ambulation and eccentric single leg control.      NMR and Therapeutic Activities:    [x] (32050 or 44501) Provided verbal/tactile cueing for activities related to improving balance, coordination, kinesthetic sense, posture, motor skill, proprioception and motor activation to allow for proper function of core, proximal hip and LE with self care and ADLs  [] (62093) Gait Re-education- Provided training and instruction to the patient for proper LE, core and proximal hip recruitment and positioning and eccentric body weight control with ambulation re-education including up and down stairs     Home Exercise Program:    [x] (21106) Reviewed/Progressed HEP activities related to strengthening, flexibility, endurance, ROM of core, proximal hip and LE for functional self-care, mobility, lifting and ambulation/stair navigation   [] (01156)Reviewed/Progressed HEP activities related to improving balance, coordination, kinesthetic sense, posture, motor skill, proprioception of core, proximal hip and LE for self care, mobility, lifting, and ambulation/stair navigation      Manual Treatments:  PROM / STM / Oscillations-Mobs:  G-I, II, III, IV (PA's, Inf., Post.)  [x] (13435) Provided manual therapy to mobilize LE, proximal hip and/or LS spine soft tissue/joints for the purpose of modulating pain, promoting relaxation,  increasing ROM, reducing/eliminating soft tissue swelling/inflammation/restriction, improving soft tissue extensibility and allowing for proper ROM for normal function with self care, mobility, lifting and ambulation. Modalities:       [] GR/ESU 15 min    [] GR 15 min  [] ESU     [] CP    [] MHP    [] declined     Charges:  Timed Code Treatment Minutes: 40   Total Treatment Minutes: 60     [x] EVAL (LOW) 50290 (typically 20 minutes face-to-face)  [] EVAL (MOD) 84267 (typically 30 minutes face-to-face)  [] EVAL (HIGH) 84211 (typically 45 minutes face-to-face)  [] RE-EVAL     [x] RS(37974) x 2    [] IONTO  [] NMR (27376) x     [] VASO  [x] Manual (87922) x 1      [] Other:  [] TA x      [] Mech Traction (97813)  [] ES(attended) (87404)      [] ES (un) (48617):     GOALS: Patient stated goal: Reduce pain in back and knee  [] Progressing: [] Met: [] Not Met: [] Adjusted    Therapist goals for Patient:   Short Term Goals: To be achieved in: 2 weeks  1. Independent in HEP and progression per patient tolerance, in order to prevent re-injury. [] Progressing: [] Met: [] Not Met: [] Adjusted  2. Patient will have a decrease in pain to facilitate improvement in movement, function, and ADLs as indicated by Functional Deficits. [] Progressing: [] Met: [] Not Met: [] Adjusted    Long Term Goals: To be achieved in: 12 weeks  1.  Disability index score of 66 or greater on FOTO outcome measure to assist with reaching prior level of function. [] Progressing: [] Met: [] Not Met: [] Adjusted  2. Patient will demonstrate increased AROM to 0-130 pain free to allow for proper joint functioning as indicated by patients Functional Deficits. [] Progressing: [] Met: [] Not Met: [] Adjusted  3. Patient will demonstrate an increase in Strength to good proximal hip strength and control, at least 4+/5 throughout LE to allow for proper functional mobility as indicated by patients Functional Deficits. [] Progressing: [] Met: [] Not Met: [] Adjusted  4. 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 [] Progressing: [] Met: [] Not Met: [] Adjusted  5. Patient will be able to ascend/descend stairs with reciprocal pattern for return to normal ADLs   [] Progressing: [] Met: [] Not Met: [] Adjusted      Overall Progression Towards Functional goals/ Treatment Progress Update:  [] Patient is progressing as expected towards functional goals listed. [] Progression is slowed due to complexities/Impairments listed. [] Progression has been slowed due to co-morbidities.   [x] Plan just implemented, too soon to assess goals progression <30days   [] Goals require adjustment due to lack of progress  [] Patient is not progressing as expected and requires additional follow up with physician  [] Other    Prognosis for POC: [x] Good [] Fair  [] Poor      Patient requires continued skilled intervention: [x] Yes  [] No      ASSESSMENT:  See eval    Treatment/Activity Tolerance:  [x] Patient tolerated treatment well [] Patient limited by fatigue  [] Patient limited by pain  [] Patient limited by other medical complications  [] Other:       PLAN: See eval  [] Continue per plan of care [] Alter current plan (see comments above)  [x] Plan of care initiated [] Hold pending MD visit [] Discharge      Electronically signed by:  Martinez Hopkins, PT , DPT [id number]    Note: If patient does not return for scheduled/ recommended follow up visits, this note will serve as a discharge from care along with most recent update on progress.

## 2022-09-07 NOTE — PLAN OF CARE
[place] [address] [facility], [facility]  [address] [place] [address]  Phone: [phone]  Fax [phone]     [facility]    Dear  Dr. Lucy Navarro,    We had the pleasure of evaluating the following patient for physical therapy services at [address] [place] [address]. A summary of our findings can be found in the initial assessment below. This includes our plan of care. If you have any questions or concerns regarding these findings, please do not hesitate to contact me at the office phone number checked above. Thank you for the referral.       Physician Signature:_______________________________Date:__________________  By signing above (or electronic signature), therapists plan is approved by physician    Patient: Leonard Rush   : 1955   MRN: [id number]  Referring Physician:  Dr. Lucy Navarro     Evaluation Date: 2022      Medical Diagnosis Information:  Diagnosis: M17.12 Primary osteoarthritis of left knee;   Treatment Diagnosis: M25.562 Left knee pain; M54.50 Lumbar spine pain                                         Insurance information: PT Insurance Information: /[facility]     Precautions/ Contra-indications: none  Latex Allergy:  [x]NO      []YES  Preferred Language for Healthcare:   [x]English       []Other:    C-SSRS Triggered by Intake questionnaire (Past 2 wk assessment):   [x] No, Questionnaire did not trigger screening.   [] Yes, Patient intake triggered further evaluation      [] C-SSRS Screening completed  [] PCP notified via Plan of Care  [] Emergency services notified     SUBJECTIVE: Patient stated complaint: Is continuing to have pain in right upper thigh on the front. Went to [facility] back in July, 16 hour car ride, had back spasms pretty much the whole time. Had muscle relax or that helped. Has had anterior thigh pain on right for 6 weeks and hasn't gone away. Does have occasional weakness in right leg.  Has lost 20 lbs since last year (diet). Relevant Medical History:seen in PT for similar issue last year  Functional Disability Index: FOTO 52/100  expected 66    LEFS 44/80    Pain Scale: 3-5/10  Easing factors: rest, avoidance, changing positions   Provocative factors: transition from sit/stand      Type: [x]Constant   []Intermittent  []Radiating []Localized []other:     Numbness/Tingling: denies    Occupation/School:retired    Living Status/Prior Level of Function: Independent with ADLs and IADLs, walking, attending [employer] games    OBJECTIVE:     ROM LEFT RIGHT   Knee ext 0 P! 0   Knee Flex 125 P! 135   Strength  LEFT RIGHT   HIP Flexors 4 4+   HIP Abductors 4 4   HIP Ext 4 4   Knee EXT (quad) 4 4+          Reflexes/Sensation: NT   []Dermatomes/Myotomes intact    []Reflexes equal and normal bilaterally   []Other:    Joint mobility:    []Normal    [x]Hypo   []Hyper    Palpation: TTP throughout anterior thigh on R LE, L knee, bilateral piriformis/glutes    Functional Mobility/Transfers: independent, anterior shift with squat    Posture: anterior pelvic tilt, genu valgum in single leg stance    Bandages/Dressings/Incisions:na    Gait: (include devices/WB status) bowing L>R, trendelenburg, wide SHERRY, slight ER bilaterally    Orthopedic Special Tests: none                       [x] Patient history, allergies, meds reviewed. Medical chart reviewed. See intake form. Review Of Systems (ROS):  [x]Performed Review of systems (Integumentary, CardioPulmonary, Neurological) by intake and observation. Intake form has been scanned into medical record. Patient has been instructed to contact their primary care physician regarding ROS issues if not already being addressed at this time.       Co-morbidities/Complexities (which will affect course of rehabilitation):   []None           Arthritic conditions   []Rheumatoid arthritis (M05.9)  [x]Osteoarthritis (M19.91)   Cardiovascular conditions   [x]Hypertension (I10)  []Hyperlipidemia (E78.5)  []Angina pectoris (I20)  []Atherosclerosis (I70)   Musculoskeletal conditions   []Disc pathology   []Congenital spine pathologies   []Prior surgical intervention  []Osteoporosis (M81.8)  []Osteopenia (M85.8)   Endocrine conditions   []Hypothyroid (E03.9)  []Hyperthyroid Gastrointestinal conditions   []Constipation (D30.87)   Metabolic conditions   [x]Morbid obesity (E66.01)  [x]Diabetes type 1(E10.65) or 2 (E11.65)   []Neuropathy (G60.9)     Pulmonary conditions   []Asthma (J45)  []Coughing   []COPD (J44.9)   Psychological Disorders  []Anxiety (F41.9)  []Depression (F32.9)   []Other:   []Other:          Barriers to/and or personal factors that will affect rehab potential:              [x]Age  []Sex              [x]Motivation/Lack of Motivation                        [x]Co-Morbidities              []Cognitive Function, education/learning barriers              []Environmental, home barriers              []profession/work barriers  [x]past PT/medical experience  []other:  Justification:     ASSESSMENT:   Functional Impairments:     [x]Noted lumbar/proximal hip/LE joint hypomobility   [x]Decreased LE functional ROM   [x]Decreased core/proximal hip strength and neuromuscular control   [x]Decreased LE functional strength   [x]Reduced balance/proprioceptive control   []other:      Functional Activity Limitations (from functional questionnaire and intake)   [x]Reduced ability to tolerate prolonged functional positions   [x]Reduced ability or difficulty with changes of positions or transfers between positions   [x]Reduced ability to maintain good posture and demonstrate good body mechanics with sitting, bending, and lifting   [x]Reduced ability to sleep   [x] Reduced ability or tolerance with driving and/or computer work   [x]Reduced ability to perform lifting, carrying tasks   [x]Reduced ability to squat   [x]Reduced ability to forward bend   [x]Reduced ability to ambulate prolonged characteristics  [] unstable and unpredictable characteristics;   [x] Clinical decision making of [x] low, [] moderate, [] high complexity using standardized patient assessment instrument and/or measurable assessment of functional outcome. [x] EVAL (LOW) 93865 (typically 20 minutes face-to-face)  [] EVAL (MOD) 99618 (typically 30 minutes face-to-face)  [] EVAL (HIGH) 22858 (typically 45 minutes face-to-face)  [] RE-EVAL     PLAN:   Frequency/Duration:  1-2 days per week for 12 Weeks:  Interventions:  [x]  Therapeutic exercise including: strength training, ROM, for Lower extremity and core   [x]  NMR activation and proprioception for LE, Glutes and Core   [x]  Manual therapy as indicated for LE, Hip and spine to include: Dry Needling/IASTM, STM, PROM, Gr I-IV mobilizations, manipulation. [x] Modalities as needed that may include: thermal agents, E-stim, Biofeedback, US, iontophoresis as indicated  [x] Patient education on joint protection, postural re-education, activity modification, progression of HEP. GOALS:  Patient stated goal: Reduce pain in back and knee  [] Progressing: [] Met: [] Not Met: [] Adjusted    Therapist goals for Patient:   Short Term Goals: To be achieved in: 2 weeks  1. Independent in HEP and progression per patient tolerance, in order to prevent re-injury. [] Progressing: [] Met: [] Not Met: [] Adjusted  2. Patient will have a decrease in pain to facilitate improvement in movement, function, and ADLs as indicated by Functional Deficits. [] Progressing: [] Met: [] Not Met: [] Adjusted    Long Term Goals: To be achieved in: 12 weeks  1. Disability index score of 66 or greater on FOTO outcome measure to assist with reaching prior level of function. [] Progressing: [] Met: [] Not Met: [] Adjusted  2. Patient will demonstrate increased AROM to 0-130 pain free to allow for proper joint functioning as indicated by patients Functional Deficits.    [] Progressing: [] Met: [] Not Met: [] Adjusted  3. Patient will demonstrate an increase in Strength to good proximal hip strength and control, at least 4+/5 throughout LE to allow for proper functional mobility as indicated by patients Functional Deficits. [] Progressing: [] Met: [] Not Met: [] Adjusted  4. Patient will return to walking from parking lot to stadium for [employer] games without increased symptoms or restriction. [] Progressing: [] Met: [] Not Met: [] Adjusted  5.  Patient will be able to ascend/descend stairs with reciprocal pattern for return to normal ADLs   [] Progressing: [] Met: [] Not Met: [] Adjusted      Electronically signed by:  Main Patel, PT DPT, [id number]

## 2022-09-12 ENCOUNTER — OFFICE VISIT (OUTPATIENT)
Dept: ORTHOPEDIC SURGERY | Age: 67
End: 2022-09-12
Payer: MEDICARE

## 2022-09-12 VITALS — BODY MASS INDEX: 29.55 KG/M2 | WEIGHT: 195 LBS | HEIGHT: 68 IN

## 2022-09-12 DIAGNOSIS — M22.42 CHONDROMALACIA OF LEFT PATELLA: ICD-10-CM

## 2022-09-12 DIAGNOSIS — M17.12 PRIMARY OSTEOARTHRITIS OF LEFT KNEE: ICD-10-CM

## 2022-09-12 DIAGNOSIS — M25.562 LEFT KNEE PAIN, UNSPECIFIED CHRONICITY: Primary | ICD-10-CM

## 2022-09-12 PROCEDURE — 20610 DRAIN/INJ JOINT/BURSA W/O US: CPT | Performed by: FAMILY MEDICINE

## 2022-09-12 RX ORDER — HYALURONATE SODIUM 10 MG/ML
20 SYRINGE (ML) INTRAARTICULAR ONCE
Status: COMPLETED | OUTPATIENT
Start: 2022-09-12 | End: 2022-09-12

## 2022-09-12 RX ADMIN — Medication 20 MG: at 10:49

## 2022-09-12 NOTE — PROGRESS NOTES
Chief Complaint  Knee Pain (EUFLEXXA #1 LEFT KNEE/) and Back Pain    FU symptomatic left and right knee pain with difficulty walking distances and pivoting with occasional left knee subtle buckling with consideration of viscosupplementation. Reevaluation ongoing mechanical low back pain with recurrent left leg pain and history of radiculopathy    History of Present Illness:  Jese Vasquez is a 69 y.o. female who is a very pleasant white female type II diabetic with last A1c 7.1 is a very nice patient of Cielo Covarrubias CNP who is being seen today for evaluation of progressive pain mildly to the lower back with achy symptoms and limited ability to walk distances in her legs bilaterally. She states that she has been having episodic achiness to her legs with subjective weakness for about 3 years but states that over the past years become more pronounced that if she walks more than 100 yards she will have to stop secondary to some achiness to her legs. If she rests more than a minute or 2 it does improve to the point where she is able to resume her activities. There is no history of injury or trauma prior to becoming symptomatic although her mother did require a laminectomy at L4-5 secondary to radiculopathy. On questioning her last year she did have an episode of radiculitis which seem to be more right-sided in the L4 distribution but improved with relative rest.  She does not utilize any type of bracing and does deny neurogenic bowel or bladder symptoms. She does have some discomfort with prolonged sitting as well and has had occasional nighttime discomfort. She was seen for foot and ankle symptoms on the right last year which responded fairly well to rehabilitation but she had been having some posterior thigh and calf pain at that point as well. She has not had recent imaging. She seems to do better with Advil as opposed to the diclofenac. She does deny neurogenic bowel or bladder symptoms.   Occasional nighttime discomfort noted. She is being seen today for orthopedic and sports consultation with imaging of her spine. We initially saw Guille Espinosa for her back on 5/3/2021 just prior to her leaving for vacation to [place]. We had recommended that she get an updated MRI of her lumbar spine given her degrees of injury but she did temporarily improve with her Medrol pack and had quite a bit of physical therapy 19 sessions last year with some improvement of her symptoms. She has noticed since roughly February 2022 she is having recurrence of pain and was having pain radiating into the lower extremities but not near those degree that they were last year as she has been good about keeping up with her exercises. When she called to make this appointment a couple of weeks ago she was having considerable mechanical back pain worse on the left than right. She is known to have underlying lumbar spondylolisthesis and probably does have some chronic episodic radicular findings but has not yet had MRI of her lumbar spine. She did try to start back on her anti-inflammatories but seems to do better with ibuprofen and has been using her warm-and-form brace. She does deny neurogenic bowel or bladder symptoms and is having more axial pain left than right as opposed to high-grade radicular symptoms currently. We last saw Guille Espinosa in the office on 5/11/2022 when all of her treatment at that point had to do with her mechanical back pain and did have a significant listhesis at L4-5 on her plain films and was complaining of symptoms most suggestive of spinal stenosis and possibly a low-grade right L5 radiculopathy. She did improve with conservative treatment but we had discussed MRI imaging and had gotten approval for an MRI scan with the plans of having her see Alena Yoder for additional treatment recommendations.   She did improve following extensive therapy at 19 sessions and apparently did have other medical issues going on for she never really did have her MRI or follow-up with Bharathi Biswas. She was doing well until they drove 16 hours to [place] early in the summer and since that time she has been having some gluteal discomfort more so on the right with some extension down the right leg in the L5 distribution without substantial groin pain. She still has her spinal stenosis symptoms and is continued with her ibuprofen 3 times daily and does occasionally utilize her brace and has been only fairly consistent with her lumbar exercise and stretching program.  Her greater complaint today is of discomfort to her left knee which is been an ongoing problem over the last several years. She does have an obvious varus deformity to the left knee and does have really only mild pain at 1-2 out of 10 with positional changes or if she walks distances or climb stairs and does have crepitation and popping. She has had some occasional pseudo buckling but no definitive locking or catching and has never really had dedicated treatment or bracing to the left knee. She is being seen today for evaluation of her back as well as right lateral hip and leg discomfort and new films of her left knee. We last saw Bailey Nelson in the office on 8/31/2022 for her left greater right knee advanced osteoarthritis. She states her knee pain symptoms are doing much better at this point and she was able to get through the [facility] [employer] [facility] football game in which she was doing frequent walking with only mild discomfort. She would like to consider viscosupplementation to her left knee where she is known to have advanced medial compartment arthropathy and varus alignment. She has just charted physical therapy for her knees and back and does continue to have some pain in her lower back with some very occasional dysesthesias into the right leg in the L5 distribution but has yet to have had her updated MRI scan of her back.   She does continue with her ibuprofen 800 mg 3 times daily and is working on her stretching program.  She is only had 1 session of therapy thus far but is working on her home-based exercises. Denies neurogenic bowel or bladder symptoms. Medical History  Patient's medications, allergies, past medical, surgical, social and family histories were reviewed and updated as appropriate. Review of Systems  Pertinent items are noted in HPI  Review of systems reviewed from Patient History Form dated on 5/3/2021 and available in the patient's chart under the Media tab. Vital Signs  There were no vitals filed for this visit. General Exam:     Constitutional: Patient is adequately groomed with no evidence of malnutrition  DTRs: Deep tendon reflexes are intact  Mental Status: The patient is oriented to time, place and person. The patient's mood and affect are appropriate. Lymphatic: The lymphatic examination bilaterally reveals all areas to be without enlargement or induration. Vascular: Examination reveals no swelling or calf tenderness. Peripheral pulses are palpable and 2+. Neurological: The patient has good coordination. There is no weakness or sensory deficit. Lumbar/Sacral Spine Examination  Inspection: There is no high-grade disc deformities or substantial scoliosis. Mild kyphosis noted. No palpable swelling or spasm. Palpation: She does have recurrent clinical tenderness over the right greater than left lumbar paraspinals and tenderness along lower lumbar facets. She does have some very mild gluteal tenderness right greater than left. Rang of Motion: She can forward flex to about 50. She does have only mildly painful extension which produces pain to the posterior thighs right slightly worse than left proximally and also discomfort into her right lateral hip and upper thigh. .      Strength: There is not appear to be focal lower extremity motor deficits. Special Tests: Pain with lumbar extension as noted above.   Her straight leg raising does not appear to be overwhelmingly positive today. Screening hip testing relatively benign. Skin: There are no rashes, ulcerations or lesions. Distal motor sensory and vascular exams intact. Gait: Mild antalgia. Reflexes:  Symmetrically preserved but 3 out of 4 at the knees 4-4 at the ankles. Additional Comments: Evaluation of her left knee does reveal obvious varus deformity. She does have a trace knee joint effusion and tenderness over the medial and lateral patellofemoral facet less prominent diffuse medial joint line tenderness noted. She is stiff in the terminal 10 degrees of extension with flexion limited to about 110 today. Hamstrings are tight. Strength 4-5 with flexion and extension. Less prominent pain with patellar grind testing and some discomfort with Abdelrahman's remarkable for crepitation but no high-grade clicks and no obvious instability noted. Additional Examinations:  Right Lower Extremity: Examination of the right lower extremity does not show any tenderness, deformity or injury. Range of motion is unremarkable. There is no gross instability. There are no rashes, ulcerations or lesions. Strength and tone are normal.  Left Lower Extremity: Examination of the left lower extremity does not show any tenderness, deformity or injury. Range of motion is unremarkable. There is no gross instability. There are no rashes, ulcerations or lesions. Strength and tone are normal.      Diagnostic Test Findings: AP and lateral lumbar spine films were reviewed from 5/3/2021 and does show multilevel lumbar degenerative disc changes with endplate spurring proximally with a grade 1 approaching grade 2 spondylolisthesis of L4-5.     Left knee AP and PA weightbearing sunrise and lateral films were reviewed from 8/31/2022 and does show advanced bone-on-bone changes to the medial compartment of her left knee with subchondral sclerosis with significant patellofemoral bone spurring and tilt.    Assessment: #1. Chronic episodically symptomatic mechanical low back pain with lumbar degenerative disc disease and grade 1-2 L4-5 lumbar spondylolisthesis with likely spinal stenosis and history of right greater than left L5 radiculitis pending lumbar MRI  #2. Chronic symptomatic left knee advanced medial compartment osteoarthritis of bone-on-bone changes medial compartment with patellofemoral arthropathy and difficulty walking distances and climbing stairs with left knee Euflexxa No. 1    Impression:  Encounter Diagnoses   Name Primary? Left knee pain, unspecified chronicity Yes    Primary osteoarthritis of left knee     Chondromalacia of left patella        Office Procedures:  Orders Placed This Encounter   Procedures    [id number] - NM DRAIN/INJECT LARGE JOINT/BURSA       Treatment Plan:  Treatment options were discussed with [facility]. We did once again review of updated plain films and exam findings. She does have a significant listhesis at L4-5 and is complaining primarily of symptoms most suggestive of spinal stenosis of limited ability to walk distances and inclines and with initial treatment she did have a transient improvement but following a 16-hour car drive to [place] for vacation she is now having worsening pain suggestive of possible recurrent left L5 radiculopathy. She has gotten back into physical therapy for her back and her knee for reemphasis of her home-based exercise program for spine and will continue with her warm-and-form belt. She never had her MRI performed, I would like for her to have a lumbar spine MRI to evaluate for spinal stenosis for both central and foraminal narrowing. She also does have advanced left knee osteoarthritis bone-on-bone changes medial compartment and fairly prominent spurring to the patellofemoral joint. She will continue with her warm-and-form belt. She will continue with her ibuprofen 800 mg 3 times daily.   He is feeling somewhat better but after discussion of pros and cons of viscosupplementation, she did receive her first injection of Euflexxa to her left knee. This was performed using a standard prefilled 2 cc syringe. She will be seen back each in the next 2 weeks to finish up her Euflexxa series. Once again she is planning on going to [place] around [place] time. It also sounds as if her primary care physicians want her to try to avoid oral or injectable steroids if possible but once again we may consider Nini Wray prior to her leaving for [place] during the holidays. She was also strongly encouraged to actually get set up for her lumbar MRI and then follow-up with Rhonda Mahmood over the next week or 2 if possible. She will continue with therapy for her back and hip. She will contact us in the interim with questions or concerns       This dictation was performed with a verbal recognition program (DRAGON) and it was checked for errors. It is possible that there are still dictated errors within this office note. If so, please bring any errors to my attention for an addendum. All efforts were made to ensure that this office note is accurate.

## 2022-09-14 ENCOUNTER — HOSPITAL ENCOUNTER (OUTPATIENT)
Dept: PHYSICAL THERAPY | Age: 67
Setting detail: THERAPIES SERIES
Discharge: HOME | End: 2022-09-14
Payer: MEDICARE

## 2022-09-14 PROCEDURE — 97110 THERAPEUTIC EXERCISES: CPT

## 2022-09-14 PROCEDURE — 97140 MANUAL THERAPY 1/> REGIONS: CPT

## 2022-09-14 NOTE — FLOWSHEET NOTE
[facility],  [address] [place]  Phone: [phone]  Fax [phone]    Physical Therapy Daily Treatment Note  Date:  2022    Patient Name:  Katie Velazquez    :  1955  MRN: [id number]  Restrictions/Precautions:    Physician Information:   Dr. Krish Donohue  Medical/Treatment Diagnosis Information:  Diagnosis: M17.12 Primary osteoarthritis of left knee;  Treatment Diagnosis: M25.562 Left knee pain; M54.50 Lumbar spine pain   [x] Conservative / [] Surgical - DOS:  Therapy Diagnosis/Practice Pattern:  Practice Pattern A: Primary Prevention  Insurance/Certification information:  PT Insurance Information: /[facility]  Plan of care signed: [] YES  [] NO  Number of Comorbidities:  []0     []1-2    [x]3+  Date of Patient follow up with Physician:     Is this a Progress Report:     []  Yes  [x]  No        If Yes:  Date Range for reporting period:  Beginning 2022  Ending     Progress report will be due (10 Rx or 30 days whichever is less): 3558       Recertification will be due (POC Duration  / 90 days whichever is less): 2022      Latex Allergy:  [x]NO      []YES  Preferred Language for Healthcare:   [x]English       []other:    Visit # Insurance Allowable   2 BMN  auth []no        []yes:     SUBJECTIVE:  Pt reports no new issues, was a little sore from last session. Feels injection may be helping.     OBJECTIVE:   Observation:  Palpation:    Test used Initial score Current Score   VAS     FOTO     flexion     extension     quad     ABD     flexion          RESTRICTIONS/PRECAUTIONS:     Exercises/Interventions:     Therapeutic Ex/NMR  Sets/reps Notes   HS floss 15 x 5\"    Piriformis stretch 3 x 15\"    LTR 15 x    3 way clam  HL march  Bridge with band 20 x  20 x  20 x OVL  OVL  OVL   SLR 2 x 10    [place] 15 x  OVL   Heel raise 30 x    Side stepping wall 2 big laps                                  Pt ed anatomy, surgery, PT progression, prognosis 10 min TKR, pros/cons   Manual Intervention     PROM, gentle STM to R hip flexor, bilateral glutes/piriformis 8 min                     Access Code: Jack Pavon  URL: [email or web address]  Date: 09/07/2022  Prepared by: Main Patel    Exercises  Hamstring floss - 1-2 x daily - 7 x weekly - 10 reps  Supine Piriformis Stretch with Foot on Ground - 1-2 x daily - 7 x weekly - 3 reps - 30 hold  Hooklying Clamshell with Resistance - 1-2 x daily - 7 x weekly - 2-3 sets - 10 reps  Hooklying Isometric Clamshell - 1-2 x daily - 7 x weekly - 2-3 sets - 10 reps  Supine Bridge with Resistance Band - 1-2 x daily - 7 x weekly - 2-3 sets - 10 reps  Seated Knee Extension with Resistance - 1-2 x daily - 7 x weekly - 2-3 sets - 10 reps      Therapeutic Exercise and NMR EXR  [x] (54161) Provided verbal/tactile cueing for activities related to strengthening, flexibility, endurance, ROM for improvements in LE, proximal hip, and core control with self care, mobility, lifting, ambulation.  [] (47508) Provided verbal/tactile cueing for activities related to improving balance, coordination, kinesthetic sense, posture, motor skill, proprioception  to assist with LE, proximal hip, and core control in self care, mobility, lifting, ambulation and eccentric single leg control.      NMR and Therapeutic Activities:    [x] (73136 or 24760) Provided verbal/tactile cueing for activities related to improving balance, coordination, kinesthetic sense, posture, motor skill, proprioception and motor activation to allow for proper function of core, proximal hip and LE with self care and ADLs  [] (99668) Gait Re-education- Provided training and instruction to the patient for proper LE, core and proximal hip recruitment and positioning and eccentric body weight control with ambulation re-education including up and down stairs     Home Exercise Program:    [x] (39360) Reviewed/Progressed HEP activities related to strengthening, flexibility, endurance, ROM of core, proximal hip and LE for functional self-care, mobility, lifting and ambulation/stair navigation   [] (77735)Reviewed/Progressed HEP activities related to improving balance, coordination, kinesthetic sense, posture, motor skill, proprioception of core, proximal hip and LE for self care, mobility, lifting, and ambulation/stair navigation      Manual Treatments:  PROM / STM / Oscillations-Mobs:  G-I, II, III, IV (PA's, Inf., Post.)  [x] (37014) Provided manual therapy to mobilize LE, proximal hip and/or LS spine soft tissue/joints for the purpose of modulating pain, promoting relaxation,  increasing ROM, reducing/eliminating soft tissue swelling/inflammation/restriction, improving soft tissue extensibility and allowing for proper ROM for normal function with self care, mobility, lifting and ambulation. Modalities:       [] GR/ESU 15 min    [] GR 15 min  [] ESU     [] CP    [] MHP    [] declined     Charges:  Timed Code Treatment Minutes: 40   Total Treatment Minutes: 40      EVAL (LOW) 25434 (typically 20 minutes face-to-face)  [] EVAL (MOD) 20780 (typically 30 minutes face-to-face)  [] EVAL (HIGH) 49960 (typically 45 minutes face-to-face)  [] RE-EVAL     [x] HL(91634) x 2    [] IONTO  [] NMR (45867) x     [] VASO  [x] Manual (44442) x 1      [] Other:  [] TA x      [] Mech Traction (37572)  [] ES(attended) (81649)      [] ES (un) (31677):     GOALS: Patient stated goal: Reduce pain in back and knee  [] Progressing: [] Met: [] Not Met: [] Adjusted    Therapist goals for Patient:   Short Term Goals: To be achieved in: 2 weeks  1. Independent in HEP and progression per patient tolerance, in order to prevent re-injury. [] Progressing: [] Met: [] Not Met: [] Adjusted  2. Patient will have a decrease in pain to facilitate improvement in movement, function, and ADLs as indicated by Functional Deficits.   [] Progressing: [] Met: [] Not Met: [] Adjusted    Long Term Goals: To be achieved in: 12 weeks  1. Disability index score of 66 or greater on FOTO outcome measure to assist with reaching prior level of function. [] Progressing: [] Met: [] Not Met: [] Adjusted  2. Patient will demonstrate increased AROM to 0-130 pain free to allow for proper joint functioning as indicated by patients Functional Deficits. [] Progressing: [] Met: [] Not Met: [] Adjusted  3. Patient will demonstrate an increase in Strength to good proximal hip strength and control, at least 4+/5 throughout LE to allow for proper functional mobility as indicated by patients Functional Deficits. [] Progressing: [] Met: [] Not Met: [] Adjusted  4. Patient will return to walking from parking lot to stadium for [employer] games without increased symptoms or restriction. [] Progressing: [] Met: [] Not Met: [] Adjusted  5. Patient will be able to ascend/descend stairs with reciprocal pattern for return to normal ADLs   [] Progressing: [] Met: [] Not Met: [] Adjusted      Overall Progression Towards Functional goals/ Treatment Progress Update:  [] Patient is progressing as expected towards functional goals listed. [] Progression is slowed due to complexities/Impairments listed. [] Progression has been slowed due to co-morbidities.   [x] Plan just implemented, too soon to assess goals progression <30days   [] Goals require adjustment due to lack of progress  [] Patient is not progressing as expected and requires additional follow up with physician  [] Other    Prognosis for POC: [x] Good [] Fair  [] Poor      Patient requires continued skilled intervention: [x] Yes  [] No      ASSESSMENT:  See eval    Treatment/Activity Tolerance:  [x] Patient tolerated treatment well [] Patient limited by fatigue  [] Patient limited by pain  [] Patient limited by other medical complications  [] Other:       PLAN: See eval  [] Continue per plan of care [] Alter current plan (see comments above)  [x] Plan of care initiated [] Hold pending

## 2022-09-19 ENCOUNTER — OFFICE VISIT (OUTPATIENT)
Dept: ORTHOPEDIC SURGERY | Age: 67
End: 2022-09-19
Payer: MEDICARE

## 2022-09-19 DIAGNOSIS — M22.42 CHONDROMALACIA OF LEFT PATELLA: ICD-10-CM

## 2022-09-19 DIAGNOSIS — M17.12 PRIMARY OSTEOARTHRITIS OF LEFT KNEE: ICD-10-CM

## 2022-09-19 DIAGNOSIS — M25.562 LEFT KNEE PAIN, UNSPECIFIED CHRONICITY: Primary | ICD-10-CM

## 2022-09-19 PROCEDURE — 1123F ACP DISCUSS/DSCN MKR DOCD: CPT | Performed by: FAMILY MEDICINE

## 2022-09-19 PROCEDURE — 20610 DRAIN/INJ JOINT/BURSA W/O US: CPT | Performed by: FAMILY MEDICINE

## 2022-09-19 PROCEDURE — G8400 PT W/DXA NO RESULTS DOC: HCPCS | Performed by: FAMILY MEDICINE

## 2022-09-19 PROCEDURE — G8428 CUR MEDS NOT DOCUMENT: HCPCS | Performed by: FAMILY MEDICINE

## 2022-09-19 PROCEDURE — 99213 OFFICE O/P EST LOW 20 MIN: CPT | Performed by: FAMILY MEDICINE

## 2022-09-19 PROCEDURE — 1036F TOBACCO NON-USER: CPT | Performed by: FAMILY MEDICINE

## 2022-09-19 PROCEDURE — G8417 CALC BMI ABV UP PARAM F/U: HCPCS | Performed by: FAMILY MEDICINE

## 2022-09-19 PROCEDURE — 3017F COLORECTAL CA SCREEN DOC REV: CPT | Performed by: FAMILY MEDICINE

## 2022-09-19 PROCEDURE — 1090F PRES/ABSN URINE INCON ASSESS: CPT | Performed by: FAMILY MEDICINE

## 2022-09-19 RX ORDER — HYALURONATE SODIUM 10 MG/ML
20 SYRINGE (ML) INTRAARTICULAR ONCE
Status: COMPLETED | OUTPATIENT
Start: 2022-09-19 | End: 2022-09-19

## 2022-09-19 RX ADMIN — Medication 20 MG: at 10:58

## 2022-09-19 NOTE — PROGRESS NOTES
Chief Complaint  Knee Pain (EUFLEXXA #2 L KNEE) and Back Pain    FU symptomatic left and right knee pain with difficulty walking distances and pivoting with occasional left knee subtle buckling with consideration of viscosupplementation. Reevaluation ongoing mechanical low back pain with recurrent left leg pain and history of radiculopathy    History of Present Illness:  Yoli Mancuso is a 69 y.o. female who is a very pleasant white female type II diabetic with last A1c 7.1 is a very nice patient of Cedric Garcia CNP who is being seen today for evaluation of progressive pain mildly to the lower back with achy symptoms and limited ability to walk distances in her legs bilaterally. She states that she has been having episodic achiness to her legs with subjective weakness for about 3 years but states that over the past years become more pronounced that if she walks more than 100 yards she will have to stop secondary to some achiness to her legs. If she rests more than a minute or 2 it does improve to the point where she is able to resume her activities. There is no history of injury or trauma prior to becoming symptomatic although her mother did require a laminectomy at L4-5 secondary to radiculopathy. On questioning her last year she did have an episode of radiculitis which seem to be more right-sided in the L4 distribution but improved with relative rest.  She does not utilize any type of bracing and does deny neurogenic bowel or bladder symptoms. She does have some discomfort with prolonged sitting as well and has had occasional nighttime discomfort. She was seen for foot and ankle symptoms on the right last year which responded fairly well to rehabilitation but she had been having some posterior thigh and calf pain at that point as well. She has not had recent imaging. She seems to do better with Advil as opposed to the diclofenac. She does deny neurogenic bowel or bladder symptoms.   Occasional nighttime discomfort noted. She is being seen today for orthopedic and sports consultation with imaging of her spine. We initially saw Skylar Reddy for her back on 5/3/2021 just prior to her leaving for vacation to [place]. We had recommended that she get an updated MRI of her lumbar spine given her degrees of injury but she did temporarily improve with her Medrol pack and had quite a bit of physical therapy 19 sessions last year with some improvement of her symptoms. She has noticed since roughly February 2022 she is having recurrence of pain and was having pain radiating into the lower extremities but not near those degree that they were last year as she has been good about keeping up with her exercises. When she called to make this appointment a couple of weeks ago she was having considerable mechanical back pain worse on the left than right. She is known to have underlying lumbar spondylolisthesis and probably does have some chronic episodic radicular findings but has not yet had MRI of her lumbar spine. She did try to start back on her anti-inflammatories but seems to do better with ibuprofen and has been using her warm-and-form brace. She does deny neurogenic bowel or bladder symptoms and is having more axial pain left than right as opposed to high-grade radicular symptoms currently. We last saw Skylar Reddy in the office on 5/11/2022 when all of her treatment at that point had to do with her mechanical back pain and did have a significant listhesis at L4-5 on her plain films and was complaining of symptoms most suggestive of spinal stenosis and possibly a low-grade right L5 radiculopathy. She did improve with conservative treatment but we had discussed MRI imaging and had gotten approval for an MRI scan with the plans of having her see Johnathan Rincon for additional treatment recommendations.   She did improve following extensive therapy at 19 sessions and apparently did have other medical issues going on for she never really did have her MRI or follow-up with Maribel Meneses. She was doing well until they drove 16 hours to [place] early in the summer and since that time she has been having some gluteal discomfort more so on the right with some extension down the right leg in the L5 distribution without substantial groin pain. She still has her spinal stenosis symptoms and is continued with her ibuprofen 3 times daily and does occasionally utilize her brace and has been only fairly consistent with her lumbar exercise and stretching program.  Her greater complaint today is of discomfort to her left knee which is been an ongoing problem over the last several years. She does have an obvious varus deformity to the left knee and does have really only mild pain at 1-2 out of 10 with positional changes or if she walks distances or climb stairs and does have crepitation and popping. She has had some occasional pseudo buckling but no definitive locking or catching and has never really had dedicated treatment or bracing to the left knee. She is being seen today for evaluation of her back as well as right lateral hip and leg discomfort and new films of her left knee. We last saw Lino Beckham in the office on 8/31/2022 for her left greater right knee advanced osteoarthritis. She states her knee pain symptoms are doing much better at this point and she was able to get through the [facility] football game in which she was doing frequent walking with only mild discomfort. She would like to consider viscosupplementation to her left knee where she is known to have advanced medial compartment arthropathy and varus alignment. She has just charted physical therapy for her knees and back and does continue to have some pain in her lower back with some very occasional dysesthesias into the right leg in the L5 distribution but has yet to have had her updated MRI scan of her back.   She does continue with her ibuprofen 800 mg 3 times daily and is working on her stretching program.  She is only had 1 session of therapy thus far but is working on her home-based exercises. Denies neurogenic bowel or bladder symptoms. Visit was last seen in the office on 9/12/2022 and was started on viscosupplementation for her left and right knee advanced osteoarthritis. Overall she is doing much better at this point is here today for second Euflexxa injection. She has noticed that she continues to have crepitation and popping but is no longer having substantial pain with walking positional changes and with stair climbing is just started physical therapy for her knees and also for her back which is also doing much better. She has continue with her ibuprofen 800 mg 3 times daily does work on her core strengthening and stretching program.  She has had down to 2 sessions of therapy with improvements and effective resolution of her radicular symptoms. Less pain at night in her back. He has been diligent with her home-based exercise program for her knee and her back. Medical History  Patient's medications, allergies, past medical, surgical, social and family histories were reviewed and updated as appropriate. Review of Systems  Pertinent items are noted in HPI  Review of systems reviewed from Patient History Form dated on 5/3/2021 and available in the patient's chart under the Media tab. Vital Signs  There were no vitals filed for this visit. General Exam:     Constitutional: Patient is adequately groomed with no evidence of malnutrition  DTRs: Deep tendon reflexes are intact  Mental Status: The patient is oriented to time, place and person. The patient's mood and affect are appropriate. Lymphatic: The lymphatic examination bilaterally reveals all areas to be without enlargement or induration. Vascular: Examination reveals no swelling or calf tenderness. Peripheral pulses are palpable and 2+.   Neurological: The patient has good injury. Range of motion is unremarkable. There is no gross instability. There are no rashes, ulcerations or lesions. Strength and tone are normal.  Left Lower Extremity: Examination of the left lower extremity does not show any tenderness, deformity or injury. Range of motion is unremarkable. There is no gross instability. There are no rashes, ulcerations or lesions. Strength and tone are normal.      Diagnostic Test Findings: AP and lateral lumbar spine films were reviewed from 5/3/2021 and does show multilevel lumbar degenerative disc changes with endplate spurring proximally with a grade 1 approaching grade 2 spondylolisthesis of L4-5. Left knee AP and PA weightbearing sunrise and lateral films were reviewed from 8/31/2022 and does show advanced bone-on-bone changes to the medial compartment of her left knee with subchondral sclerosis with significant patellofemoral bone spurring and tilt. Assessment: #1. Chronic symptomatically improving mechanical low back pain with lumbar degenerative disc disease and grade 1-2 L4-5 lumbar spondylolisthesis with likely spinal stenosis and history of right greater than left L5 radiculitis pending lumbar MRI  #2. Chronic symptomatic left knee advanced medial compartment osteoarthritis of bone-on-bone changes medial compartment with patellofemoral arthropathy and difficulty walking distances and climbing stairs with left knee Euflexxa No. 2    Impression:  Encounter Diagnoses   Name Primary? Left knee pain, unspecified chronicity Yes    Primary osteoarthritis of left knee     Chondromalacia of left patella        Office Procedures:  Orders Placed This Encounter   Procedures    20610 - [place] DRAIN/INJECT LARGE JOINT/BURSA       Treatment Plan:  Treatment options were discussed with Ellie Reynoso. We did once again review of updated plain films and exam findings.   She does have a significant listhesis at L4-5 and is complaining primarily of symptoms most suggestive of spinal stenosis of limited ability to walk distances and inclines and with initial treatment she did have a transient improvement but following a 16-hour car drive to [place] for vacation and does seem to be improving with her previous pain suggestive of possible recurrent left L5 radiculopathy. She has gotten back into physical therapy for her back and her knee for reemphasis of her home-based exercise program for spine and will continue with her warm-and-form belt. She never had her MRI performed, I would like for her to have a lumbar spine MRI to evaluate for spinal stenosis for both central and foraminal narrowing. She also does have advanced left knee osteoarthritis bone-on-bone changes medial compartment and fairly prominent spurring to the patellofemoral joint. She will continue with her warm-and-form belt. She will continue with her ibuprofen 800 mg 3 times daily. He is feeling somewhat better but after discussion of pros and cons of viscosupplementation, she did receive her second injection of Euflexxa to her left knee. This was performed using a standard prefilled 2 cc syringe. She does seem to be doing much better with both her back and her knee and will be seen back next week to finish up her viscosupplementation series. Once again she is planning on going to [place] around [place] time. It also sounds as if her primary care physicians want her to try to avoid oral or injectable steroids if possible but once again we may consider Kayleigh Kan prior to her leaving for [place] during the holidays. She was also once again strongly encouraged to actually get set up for her lumbar MRI and then follow-up with Gar Mins over the next week or 2 if possible. She will continue with therapy for her back and hip as well as her knee.   She will contact us in the interim with questions or concerns       This dictation was performed with a verbal recognition program (DRAGON) and it was checked for errors. It is possible that there are still dictated errors within this office note. If so, please bring any errors to my attention for an addendum. All efforts were made to ensure that this office note is accurate.

## 2022-09-21 ENCOUNTER — HOSPITAL ENCOUNTER (OUTPATIENT)
Dept: PHYSICAL THERAPY | Age: 67
Setting detail: THERAPIES SERIES
Discharge: HOME | End: 2022-09-21
Payer: MEDICARE

## 2022-09-21 PROCEDURE — 97110 THERAPEUTIC EXERCISES: CPT

## 2022-09-21 PROCEDURE — 97016 VASOPNEUMATIC DEVICE THERAPY: CPT

## 2022-09-21 PROCEDURE — 97140 MANUAL THERAPY 1/> REGIONS: CPT

## 2022-09-21 NOTE — FLOWSHEET NOTE
[facility], [address] [place]  Phone: [phone]  Fax [phone]    Physical Therapy Daily Treatment Note  Date:  2022    Patient Name:  Carola Wilkinson    :  1955  MRN: [id number]  Restrictions/Precautions:    Physician Information:   Dr. Hugh Dance  Medical/Treatment Diagnosis Information:  Diagnosis: M17.12 Primary osteoarthritis of left knee;  Treatment Diagnosis: M25.562 Left knee pain; M54.50 Lumbar spine pain   [x] Conservative / [] Surgical - DOS:  Therapy Diagnosis/Practice Pattern:  Practice Pattern A: Primary Prevention  Insurance/Certification information:  PT Insurance Information: /[facility]  Plan of care signed: [] YES  [] NO  Number of Comorbidities:  []0     []1-2    [x]3+  Date of Patient follow up with Physician:     Is this a Progress Report:     []  Yes  [x]  No        If Yes:  Date Range for reporting period:  Beginning 2022  Ending     Progress report will be due (10 Rx or 30 days whichever is less):        Recertification will be due (POC Duration  / 90 days whichever is less): 2022      Latex Allergy:  [x]NO      []YES  Preferred Language for Healthcare:   [x]English       []other:    Visit # Insurance Allowable   3 BMN  auth []no        []yes:     SUBJECTIVE:  Pt reports feeling a little more back pain after last session, fairly sore all over.     OBJECTIVE:   Observation:  Palpation:    Test used Initial score Current Score   VAS     FOTO     flexion     extension     quad     ABD     flexion          RESTRICTIONS/PRECAUTIONS:     Exercises/Interventions:     Therapeutic Ex/NMR  Sets/reps Notes   HS floss 15 x 5\"    Piriformis stretch 3 x 15\"    LTR 15 x    3 way clam  HL march  Bridge with band 20 x  20 x  20 x OVL  OVL  OVL   SLR 2 x 10    Del Dios 15 x  OVL   Heel raise 30 x    Side stepping wall 3 big laps    FSU 15 x R/L 6\"   LSU and over 20 x R/L foam Pt ed anatomy, surgery, PT progression, prognosis 10 min TKR, pros/cons   Manual Intervention     PROM, gentle STM to R hip flexor, bilateral glutes/piriformis 8 min                     Access Code: Sameer Chamorro  URL: [email or web address]  Date: 09/07/2022  Prepared by: Marc Peña    Exercises  Hamstring floss - 1-2 x daily - 7 x weekly - 10 reps  Supine Piriformis Stretch with Foot on Ground - 1-2 x daily - 7 x weekly - 3 reps - 30 hold  Hooklying Clamshell with Resistance - 1-2 x daily - 7 x weekly - 2-3 sets - 10 reps  Hooklying Isometric Clamshell - 1-2 x daily - 7 x weekly - 2-3 sets - 10 reps  Supine Bridge with Resistance Band - 1-2 x daily - 7 x weekly - 2-3 sets - 10 reps  Seated Knee Extension with Resistance - 1-2 x daily - 7 x weekly - 2-3 sets - 10 reps      Therapeutic Exercise and NMR EXR  [x] (81795) Provided verbal/tactile cueing for activities related to strengthening, flexibility, endurance, ROM for improvements in LE, proximal hip, and core control with self care, mobility, lifting, ambulation.  [] (20058) Provided verbal/tactile cueing for activities related to improving balance, coordination, kinesthetic sense, posture, motor skill, proprioception  to assist with LE, proximal hip, and core control in self care, mobility, lifting, ambulation and eccentric single leg control.      NMR and Therapeutic Activities:    [x] (36800 or 13090) Provided verbal/tactile cueing for activities related to improving balance, coordination, kinesthetic sense, posture, motor skill, proprioception and motor activation to allow for proper function of core, proximal hip and LE with self care and ADLs  [] (98887) Gait Re-education- Provided training and instruction to the patient for proper LE, core and proximal hip recruitment and positioning and eccentric body weight control with ambulation re-education including up and down stairs     Home Exercise Program:    [x] (94544) Reviewed/Progressed HEP activities related to strengthening, flexibility, endurance, ROM of core, proximal hip and LE for functional self-care, mobility, lifting and ambulation/stair navigation   [] (24690)Reviewed/Progressed HEP activities related to improving balance, coordination, kinesthetic sense, posture, motor skill, proprioception of core, proximal hip and LE for self care, mobility, lifting, and ambulation/stair navigation      Manual Treatments:  PROM / STM / Oscillations-Mobs:  G-I, II, III, IV (PA's, Inf., Post.)  [x] (50787) Provided manual therapy to mobilize LE, proximal hip and/or LS spine soft tissue/joints for the purpose of modulating pain, promoting relaxation,  increasing ROM, reducing/eliminating soft tissue swelling/inflammation/restriction, improving soft tissue extensibility and allowing for proper ROM for normal function with self care, mobility, lifting and ambulation. Modalities:       [] GR/ESU 15 min    [] GR 15 min  [] ESU     [] CP    [] MHP    [] declined     Charges:  Timed Code Treatment Minutes: 40   Total Treatment Minutes: 50      EVAL (LOW) 53668 (typically 20 minutes face-to-face)  [] EVAL (MOD) 96808 (typically 30 minutes face-to-face)  [] EVAL (HIGH) 24747 (typically 45 minutes face-to-face)  [] RE-EVAL     [x] WI(66284) x 2    [] IONTO  [] NMR (62458) x     [x] VASO  [x] Manual (89084) x 1      [] Other:  [] TA x      [] Mech Traction (00316)  [] ES(attended) (11533)      [] ES (un) (04157):     GOALS: Patient stated goal: Reduce pain in back and knee  [] Progressing: [] Met: [] Not Met: [] Adjusted    Therapist goals for Patient:   Short Term Goals: To be achieved in: 2 weeks  1. Independent in HEP and progression per patient tolerance, in order to prevent re-injury. [] Progressing: [x] Met: [] Not Met: [] Adjusted  2. Patient will have a decrease in pain to facilitate improvement in movement, function, and ADLs as indicated by Functional Deficits.   [] Progressing: [x] Met: [] Not Met: [] Adjusted    Long Term Goals: To be achieved in: 12 weeks  1. Disability index score of 66 or greater on FOTO outcome measure to assist with reaching prior level of function. [] Progressing: [] Met: [] Not Met: [] Adjusted  2. Patient will demonstrate increased AROM to 0-130 pain free to allow for proper joint functioning as indicated by patients Functional Deficits. [] Progressing: [] Met: [] Not Met: [] Adjusted  3. Patient will demonstrate an increase in Strength to good proximal hip strength and control, at least 4+/5 throughout LE to allow for proper functional mobility as indicated by patients Functional Deficits. [] Progressing: [] Met: [] Not Met: [] Adjusted  4. Patient will return to walking from parking lot to stadium for [employer] games without increased symptoms or restriction. [] Progressing: [] Met: [] Not Met: [] Adjusted  5. Patient will be able to ascend/descend stairs with reciprocal pattern for return to normal ADLs   [] Progressing: [] Met: [] Not Met: [] Adjusted      Overall Progression Towards Functional goals/ Treatment Progress Update:  [x] Patient is progressing as expected towards functional goals listed. [] Progression is slowed due to complexities/Impairments listed. [] Progression has been slowed due to co-morbidities. [] Plan just implemented, too soon to assess goals progression <30days   [] Goals require adjustment due to lack of progress  [] Patient is not progressing as expected and requires additional follow up with physician  [] Other    Prognosis for POC: [x] Good [] Fair  [] Poor      Patient requires continued skilled intervention: [x] Yes  [] No      ASSESSMENT:  Patient with bilateral hamstring cramping/sciatic symptoms following manual therapy and table exercises. Difficult time describing symptoms.      Treatment/Activity Tolerance:  [x] Patient tolerated treatment well [] Patient limited by fatigue  [] Patient limited by pain  [] Patient limited by other medical complications  [] Other:       PLAN: See eval  [] Continue per plan of care [] Alter current plan (see comments above)  [x] Plan of care initiated [] Hold pending MD visit [] Discharge      Electronically signed by:  Ro Benavides, PT , DPT [id number]    Note: If patient does not return for scheduled/ recommended follow up visits, this note will serve as a discharge from care along with most recent update on progress.

## 2022-09-26 ENCOUNTER — OFFICE VISIT (OUTPATIENT)
Dept: ORTHOPEDIC SURGERY | Age: 67
End: 2022-09-26
Payer: MEDICARE

## 2022-09-26 DIAGNOSIS — M25.562 LEFT KNEE PAIN, UNSPECIFIED CHRONICITY: ICD-10-CM

## 2022-09-26 DIAGNOSIS — M22.42 CHONDROMALACIA OF LEFT PATELLA: ICD-10-CM

## 2022-09-26 DIAGNOSIS — M17.12 PRIMARY OSTEOARTHRITIS OF LEFT KNEE: Primary | ICD-10-CM

## 2022-09-26 PROCEDURE — G8400 PT W/DXA NO RESULTS DOC: HCPCS | Performed by: FAMILY MEDICINE

## 2022-09-26 PROCEDURE — G8428 CUR MEDS NOT DOCUMENT: HCPCS | Performed by: FAMILY MEDICINE

## 2022-09-26 PROCEDURE — 20610 DRAIN/INJ JOINT/BURSA W/O US: CPT | Performed by: FAMILY MEDICINE

## 2022-09-26 PROCEDURE — 1090F PRES/ABSN URINE INCON ASSESS: CPT | Performed by: FAMILY MEDICINE

## 2022-09-26 PROCEDURE — G8417 CALC BMI ABV UP PARAM F/U: HCPCS | Performed by: FAMILY MEDICINE

## 2022-09-26 PROCEDURE — 99213 OFFICE O/P EST LOW 20 MIN: CPT | Performed by: FAMILY MEDICINE

## 2022-09-26 PROCEDURE — 1036F TOBACCO NON-USER: CPT | Performed by: FAMILY MEDICINE

## 2022-09-26 PROCEDURE — 3017F COLORECTAL CA SCREEN DOC REV: CPT | Performed by: FAMILY MEDICINE

## 2022-09-26 PROCEDURE — 1123F ACP DISCUSS/DSCN MKR DOCD: CPT | Performed by: FAMILY MEDICINE

## 2022-09-26 RX ORDER — HYALURONATE SODIUM 10 MG/ML
20 SYRINGE (ML) INTRAARTICULAR ONCE
Status: COMPLETED | OUTPATIENT
Start: 2022-09-26 | End: 2022-09-26

## 2022-09-26 RX ADMIN — Medication 20 MG: at 10:53

## 2022-09-26 NOTE — PROGRESS NOTES
Chief Complaint  Knee Pain (EUFLEXXA #3 L KNEE)    FU symptomatic left and right knee pain with difficulty walking distances and pivoting with occasional left knee subtle buckling with continuation of viscosupplementation. Reevaluation ongoing mechanical low back pain with recurrent left leg pain and history of radiculopathy    History of Present Illness:  Russ Sanders is a 69 y.o. female who is a very pleasant white female type II diabetic with last A1c 7.1 is a very nice patient of Mickeal Pates CNP who is being seen today for evaluation of progressive pain mildly to the lower back with achy symptoms and limited ability to walk distances in her legs bilaterally. She states that she has been having episodic achiness to her legs with subjective weakness for about 3 years but states that over the past years become more pronounced that if she walks more than 100 yards she will have to stop secondary to some achiness to her legs. If she rests more than a minute or 2 it does improve to the point where she is able to resume her activities. There is no history of injury or trauma prior to becoming symptomatic although her mother did require a laminectomy at L4-5 secondary to radiculopathy. On questioning her last year she did have an episode of radiculitis which seem to be more right-sided in the L4 distribution but improved with relative rest.  She does not utilize any type of bracing and does deny neurogenic bowel or bladder symptoms. She does have some discomfort with prolonged sitting as well and has had occasional nighttime discomfort. She was seen for foot and ankle symptoms on the right last year which responded fairly well to rehabilitation but she had been having some posterior thigh and calf pain at that point as well. She has not had recent imaging. She seems to do better with Advil as opposed to the diclofenac. She does deny neurogenic bowel or bladder symptoms.   Occasional nighttime discomfort have her MRI or follow-up with [facility]. She was doing well until they drove 16 hours to [place] early in the summer and since that time she has been having some gluteal discomfort more so on the right with some extension down the right leg in the L5 distribution without substantial groin pain. She still has her spinal stenosis symptoms and is continued with her ibuprofen 3 times daily and does occasionally utilize her brace and has been only fairly consistent with her lumbar exercise and stretching program.  Her greater complaint today is of discomfort to her left knee which is been an ongoing problem over the last several years. She does have an obvious varus deformity to the left knee and does have really only mild pain at 1-2 out of 10 with positional changes or if she walks distances or climb stairs and does have crepitation and popping. She has had some occasional pseudo buckling but no definitive locking or catching and has never really had dedicated treatment or bracing to the left knee. She is being seen today for evaluation of her back as well as right lateral hip and leg discomfort and new films of her left knee. We last saw Reyes Apa in the office on 8/31/2022 for her left greater right knee advanced osteoarthritis. She states her knee pain symptoms are doing much better at this point and she was able to get through the [employer] football game in which she was doing frequent walking with only mild discomfort. She would like to consider viscosupplementation to her left knee where she is known to have advanced medial compartment arthropathy and varus alignment. She has just charted physical therapy for her knees and back and does continue to have some pain in her lower back with some very occasional dysesthesias into the right leg in the L5 distribution but has yet to have had her updated MRI scan of her back.   She does continue with her ibuprofen 800 mg 3 times daily and is working on her stretching program.  She is only had 1 session of therapy thus far but is working on her home-based exercises. Denies neurogenic bowel or bladder symptoms. Visit was last seen in the office on 9/12/2022 and was started on viscosupplementation for her left and right knee advanced osteoarthritis. Overall she is doing much better at this point is here today for second Euflexxa injection. She has noticed that she continues to have crepitation and popping but is no longer having substantial pain with walking positional changes and with stair climbing is just started physical therapy for her knees and also for her back which is also doing much better. She has continue with her ibuprofen 800 mg 3 times daily does work on her core strengthening and stretching program.  She has had down to 2 sessions of therapy with improvements and effective resolution of her radicular symptoms. Less pain at night in her back. He has been diligent with her home-based exercise program for her knee and her back. We last saw Onofre Allen in the office on 9/19/2022 and was continued on viscosupplementation for her left greater than right knee advanced osteoarthritis. We are in the process of continuing viscosupplementation to her left knee and presents back today stating that her knee pain arthritic symptoms have improved and she is having less pain with positional changes walking and climbing stairs. She has been working on her exercise program and does utilize her brace and is continue with her ibuprofen 800 mg 3 times daily. She denies locking catching or true instability symptoms. She has also been going through physical therapy for her mechanical back pain and while it has improved over the summer she still is having episodic radiating pain suspicious for radiculopathy down the right leg. She does deny neurogenic bowel or bladder symptoms and is having less night pain.   She has yet to set up her lumbar MRI or see Micheal Gottron despite us talking about this throughout the summer. Medical History  Patient's medications, allergies, past medical, surgical, social and family histories were reviewed and updated as appropriate. Review of Systems  Pertinent items are noted in HPI  Review of systems reviewed from Patient History Form dated on 5/3/2021 and available in the patient's chart under the Media tab. Vital Signs  There were no vitals filed for this visit. General Exam:     Constitutional: Patient is adequately groomed with no evidence of malnutrition  DTRs: Deep tendon reflexes are intact  Mental Status: The patient is oriented to time, place and person. The patient's mood and affect are appropriate. Lymphatic: The lymphatic examination bilaterally reveals all areas to be without enlargement or induration. Vascular: Examination reveals no swelling or calf tenderness. Peripheral pulses are palpable and 2+. Neurological: The patient has good coordination. There is no weakness or sensory deficit. Lumbar/Sacral Spine Examination  Inspection: There is no high-grade disc deformities or substantial scoliosis. Mild kyphosis noted. No palpable swelling or spasm. Palpation: She does have improving recurrent clinical tenderness over the right greater than left lumbar paraspinals and tenderness along lower lumbar facets. She does have some very mild gluteal tenderness right greater than left. Rang of Motion: She can forward flex to about 50-55. She does have only mildly painful extension which produces pain to the posterior thighs right slightly worse than left proximally and also discomfort into her right lateral hip and upper thigh. .      Strength: There is not appear to be focal lower extremity motor deficits. Special Tests: Much less prominent pain with lumbar extension as noted above. Her straight leg raising does not appear to be overwhelmingly positive today.   Screening hip testing relatively benign. Skin: There are no rashes, ulcerations or lesions. Distal motor sensory and vascular exams intact. Gait: Improving gait    Reflexes:  Symmetrically preserved but 3 out of 4 at the knees 4-4 at the ankles. Additional Comments: Evaluation of her left knee does reveal obvious varus deformity. She does have a trace knee joint effusion and tenderness over the medial and lateral patellofemoral facet less prominent diffuse medial joint line tenderness noted. She is substantially improved with her extension with flexion improved to 115 today. Hamstrings are still a little tight. Strength 4-5 with flexion and extension. Less prominent pain with patellar grind testing and some discomfort with Abdelrahman's remarkable for crepitation but no high-grade clicks and no obvious instability noted. Additional Examinations:  Right Lower Extremity: Examination of the right lower extremity does not show any tenderness, deformity or injury. Range of motion is unremarkable. There is no gross instability. There are no rashes, ulcerations or lesions. Strength and tone are normal.  Left Lower Extremity: Examination of the left lower extremity does not show any tenderness, deformity or injury. Range of motion is unremarkable. There is no gross instability. There are no rashes, ulcerations or lesions. Strength and tone are normal.      Diagnostic Test Findings: AP and lateral lumbar spine films were reviewed from 5/3/2021 and does show multilevel lumbar degenerative disc changes with endplate spurring proximally with a grade 1 approaching grade 2 spondylolisthesis of L4-5. Left knee AP and PA weightbearing sunrise and lateral films were reviewed from 8/31/2022 and does show advanced bone-on-bone changes to the medial compartment of her left knee with subchondral sclerosis with significant patellofemoral bone spurring and tilt. Assessment: #1.   Chronic symptomatically improving mechanical low back pain with lumbar degenerative disc disease and grade 1-2 L4-5 lumbar spondylolisthesis with likely spinal stenosis and history of right greater than left L5 radiculitis pending lumbar MRI  #2. Chronic symptomatic left knee advanced medial compartment osteoarthritis of bone-on-bone changes medial compartment with patellofemoral arthropathy and difficulty walking distances and climbing stairs with left knee Euflexxa No. 3    Impression:  Encounter Diagnoses   Name Primary? Primary osteoarthritis of left knee Yes    Chondromalacia of left patella     Left knee pain, unspecified chronicity        Office Procedures:  Orders Placed This Encounter   Procedures    DC ARTHROCENTESIS ASPIR&/INJ MAJOR JT/BURSA W/O US       Treatment Plan:  Treatment options were discussed with Fortino Reynoso. We did once again review of updated plain films and exam findings. She does have a significant listhesis at L4-5 and is complaining primarily of symptoms most suggestive of spinal stenosis of limited ability to walk distances and inclines and with initial treatment she did have a transient improvement but following a 16-hour car drive to [place] for vacation and does seem to be improving with her previous pain suggestive of possible recurrent left L5 radiculopathy. She has gotten back into physical therapy for her back and her knee for reemphasis of her home-based exercise program for spine and will continue with her warm-and-form belt. She never had her MRI performed, I would like for her to have a lumbar spine MRI to evaluate for spinal stenosis for both central and foraminal narrowing. She also does have advanced left knee osteoarthritis bone-on-bone changes medial compartment and fairly prominent spurring to the patellofemoral joint. She will continue with her warm-and-form belt. She will continue with her ibuprofen 800 mg 3 times daily.   He is feeling somewhat better but after discussion of pros and cons of viscosupplementation, she did receive her second injection of Euflexxa to her left knee. This was performed using a standard prefilled 2 cc syringe. She does seem to be doing much better with both her back and her knee and will be seen back next week to finish up her viscosupplementation series. Once again she is planning on going to [place] around [place] time. It also sounds as if her primary care physicians want her to try to avoid oral or injectable steroids if possible but once again we may consider Vickydaniela Roger prior to her leaving for [place] during the holidays. She was also once again strongly encouraged to actually get set up for her lumbar MRI and then follow-up with Mauricio Bean over the next week or 2 if possible. She is aware that she can have repeat viscosupplementation at 6-month intervals although as noted above we may consider Vickyfatmata Duran prior to her leaving for [place]. She will contact us in the interim with questions or concerns       This dictation was performed with a verbal recognition program (DRAGON) and it was checked for errors. It is possible that there are still dictated errors within this office note. If so, please bring any errors to my attention for an addendum. All efforts were made to ensure that this office note is accurate.

## 2022-09-28 ENCOUNTER — HOSPITAL ENCOUNTER (OUTPATIENT)
Dept: PHYSICAL THERAPY | Age: 67
Setting detail: THERAPIES SERIES
Discharge: HOME | End: 2022-09-28
Payer: MEDICARE

## 2022-09-28 PROCEDURE — 97016 VASOPNEUMATIC DEVICE THERAPY: CPT

## 2022-09-28 PROCEDURE — 97140 MANUAL THERAPY 1/> REGIONS: CPT

## 2022-09-28 PROCEDURE — 97110 THERAPEUTIC EXERCISES: CPT

## 2022-09-28 NOTE — FLOWSHEET NOTE
[facility],  [address] [place]  Phone: [phone]  Fax [phone]    Physical Therapy Daily Treatment Note  Date:  2022    Patient Name:  Russ Sanders    :  1955  MRN: [id number]  Restrictions/Precautions:    Physician Information:   Dr. Adelaida Zhang  Medical/Treatment Diagnosis Information:  Diagnosis: M17.12 Primary osteoarthritis of left knee;  Treatment Diagnosis: M25.562 Left knee pain; M54.50 Lumbar spine pain   [x] Conservative / [] Surgical - DOS:  Therapy Diagnosis/Practice Pattern:  Practice Pattern A: Primary Prevention  Insurance/Certification information:  PT Insurance Information: /[facility]  Plan of care signed: [] YES  [] NO  Number of Comorbidities:  []0     []1-2    [x]3+  Date of Patient follow up with Physician:     Is this a Progress Report:     []  Yes  [x]  No        If Yes:  Date Range for reporting period:  Beginning 2022  Ending     Progress report will be due (10 Rx or 30 days whichever is less):        Recertification will be due (POC Duration  / 90 days whichever is less): 2022      Latex Allergy:  [x]NO      []YES  Preferred Language for Healthcare:   [x]English       []other:    Visit # Insurance Allowable   5 BMN  auth []no        []yes:     SUBJECTIVE:  Pt reports knees and thigh doing better. Still having sciatic pain.     OBJECTIVE:   Observation:  Palpation:    Test used Initial score Current Score   VAS     FOTO     flexion     extension     quad     ABD     flexion          RESTRICTIONS/PRECAUTIONS:     Exercises/Interventions:     Therapeutic Ex/NMR  Sets/reps Notes   HS floss 15 x 5\"    Piriformis stretch 3 x 15\"    LTR 15 x    3 way clam  HL march  Bridge with band 20 x  20 x  20 x OVL  OVL  OVL   SLR 2 x 10    [place] 15 x  OVL   Heel raise 30 x    Side stepping wall 3 big laps    FSU 15 x R/L 6\"   LSU and over 20 x R/L foam                       Pt ed anatomy, surgery, PT progression, prognosis 10 min TKR, pros/cons   Manual Intervention     PROM, gentle STM to R hip flexor, bilateral glutes/piriformis 8 min                     Access Code: Cy Turcios  URL: [email or web address]  Date: 09/07/2022  Prepared by: Storm Mathews    Exercises  Hamstring floss - 1-2 x daily - 7 x weekly - 10 reps  Supine Piriformis Stretch with Foot on Ground - 1-2 x daily - 7 x weekly - 3 reps - 30 hold  Hooklying Clamshell with Resistance - 1-2 x daily - 7 x weekly - 2-3 sets - 10 reps  Hooklying Isometric Clamshell - 1-2 x daily - 7 x weekly - 2-3 sets - 10 reps  Supine Bridge with Resistance Band - 1-2 x daily - 7 x weekly - 2-3 sets - 10 reps  Seated Knee Extension with Resistance - 1-2 x daily - 7 x weekly - 2-3 sets - 10 reps      Therapeutic Exercise and NMR EXR  [x] (82134) Provided verbal/tactile cueing for activities related to strengthening, flexibility, endurance, ROM for improvements in LE, proximal hip, and core control with self care, mobility, lifting, ambulation.  [] (76291) Provided verbal/tactile cueing for activities related to improving balance, coordination, kinesthetic sense, posture, motor skill, proprioception  to assist with LE, proximal hip, and core control in self care, mobility, lifting, ambulation and eccentric single leg control.      NMR and Therapeutic Activities:    [x] (62711 or 56026) Provided verbal/tactile cueing for activities related to improving balance, coordination, kinesthetic sense, posture, motor skill, proprioception and motor activation to allow for proper function of core, proximal hip and LE with self care and ADLs  [] (04266) Gait Re-education- Provided training and instruction to the patient for proper LE, core and proximal hip recruitment and positioning and eccentric body weight control with ambulation re-education including up and down stairs     Home Exercise Program:    [x] (25043) Reviewed/Progressed HEP activities related to strengthening, flexibility, endurance, ROM of core, proximal hip and LE for functional self-care, mobility, lifting and ambulation/stair navigation   [] (49829)Reviewed/Progressed HEP activities related to improving balance, coordination, kinesthetic sense, posture, motor skill, proprioception of core, proximal hip and LE for self care, mobility, lifting, and ambulation/stair navigation      Manual Treatments:  PROM / STM / Oscillations-Mobs:  G-I, II, III, IV (PA's, Inf., Post.)  [x] (13790) Provided manual therapy to mobilize LE, proximal hip and/or LS spine soft tissue/joints for the purpose of modulating pain, promoting relaxation,  increasing ROM, reducing/eliminating soft tissue swelling/inflammation/restriction, improving soft tissue extensibility and allowing for proper ROM for normal function with self care, mobility, lifting and ambulation. Modalities:       [] GR/ESU 15 min    [] GR 15 min  [] ESU     [] CP    [] MHP    [] declined     Charges:  Timed Code Treatment Minutes: 40   Total Treatment Minutes: 50      EVAL (LOW) 07502 (typically 20 minutes face-to-face)  [] EVAL (MOD) 41236 (typically 30 minutes face-to-face)  [] EVAL (HIGH) 29431 (typically 45 minutes face-to-face)  [] RE-EVAL     [x] RS(31450) x 2    [] IONTO  [] NMR (95999) x     [x] VASO  [x] Manual (73997) x 1      [] Other:  [] TA x      [] Mech Traction (46951)  [] ES(attended) (20171)      [] ES (un) (77619):     GOALS: Patient stated goal: Reduce pain in back and knee  [] Progressing: [] Met: [] Not Met: [] Adjusted    Therapist goals for Patient:   Short Term Goals: To be achieved in: 2 weeks  1. Independent in HEP and progression per patient tolerance, in order to prevent re-injury. [] Progressing: [x] Met: [] Not Met: [] Adjusted  2. Patient will have a decrease in pain to facilitate improvement in movement, function, and ADLs as indicated by Functional Deficits.   [] Progressing: [x] Met: [] Not Met: [] Adjusted    Long Term Goals: To be achieved in: 12 weeks  1. Disability index score of 66 or greater on FOTO outcome measure to assist with reaching prior level of function. [] Progressing: [] Met: [] Not Met: [] Adjusted  2. Patient will demonstrate increased AROM to 0-130 pain free to allow for proper joint functioning as indicated by patients Functional Deficits. [] Progressing: [] Met: [] Not Met: [] Adjusted  3. Patient will demonstrate an increase in Strength to good proximal hip strength and control, at least 4+/5 throughout LE to allow for proper functional mobility as indicated by patients Functional Deficits. [] Progressing: [] Met: [] Not Met: [] Adjusted  4. Patient will return to walking from parking lot to stadium for [employer] games without increased symptoms or restriction. [] Progressing: [] Met: [] Not Met: [] Adjusted  5. Patient will be able to ascend/descend stairs with reciprocal pattern for return to normal ADLs   [] Progressing: [] Met: [] Not Met: [] Adjusted      Overall Progression Towards Functional goals/ Treatment Progress Update:  [x] Patient is progressing as expected towards functional goals listed. [] Progression is slowed due to complexities/Impairments listed. [] Progression has been slowed due to co-morbidities. [] Plan just implemented, too soon to assess goals progression <30days   [] Goals require adjustment due to lack of progress  [] Patient is not progressing as expected and requires additional follow up with physician  [] Other    Prognosis for POC: [x] Good [] Fair  [] Poor      Patient requires continued skilled intervention: [x] Yes  [] No      ASSESSMENT:  Patient with bilateral hamstring cramping/sciatic symptoms following manual therapy and table exercises. Difficult time describing symptoms.      Treatment/Activity Tolerance:  [x] Patient tolerated treatment well [] Patient limited by fatigue  [] Patient limited by pain  [] Patient limited by other medical complications  [] Other: PLAN: See eval  [] Continue per plan of care [] Alter current plan (see comments above)  [x] Plan of care initiated [] Hold pending MD visit [] Discharge      Electronically signed by:  Barry Scott PT , DPT [id number]    Note: If patient does not return for scheduled/ recommended follow up visits, this note will serve as a discharge from care along with most recent update on progress.

## 2022-10-05 ENCOUNTER — APPOINTMENT (OUTPATIENT)
Dept: PHYSICAL THERAPY | Age: 67
End: 2022-10-05
Payer: MEDICARE

## 2022-10-05 ENCOUNTER — TELEPHONE (OUTPATIENT)
Dept: ORTHOPEDIC SURGERY | Age: 67
End: 2022-10-05

## 2022-10-05 NOTE — TELEPHONE ENCOUNTER
SPOKE TO PATIENT - IS HAVING A BACK FLAIR, WORSE THAN IT HAS BEEN. WAS CALLING TO ASK ABOUT ROTATING IBUPROFEN AND TYLENOL. TALKED TO HER ABOUT THAT AS WELL AS USING AN ELECTRIC HEAT PACK AND THEN FINDING A COMFORTABLE POSITION - WHETHER THAT IS LAYING ON STOMACH OR LAYING ON BACK WITH KNEES TO CHEST. PATIENT IS SCHEDULED FOR LUMBAR MRI TOWARDS THE END OF THE MONTH. WILL F/U WITH [facility]. NEEDS A LETTER TO CANCEL GOING TO [place].

## 2022-10-05 NOTE — TELEPHONE ENCOUNTER
General Question     Subject: Patient is requesting a call back, has a few questions that she needs answered  Patient and /or Facility Request: [facility], [place], operated [facility] Number: [phone]

## 2022-10-05 NOTE — LETTER
[facility]  [place] [address] [address] [address]  Phone: [phone]  Fax: [phone]    Shawanda Chi MD        October 5, 2022     Patient: Tereza Reynoso   YOB: 1955   Date of Visit: 10/5/2022       To Whom It May Concern: It is my medical opinion that Tierra Soliman cannot attend concert this evening 03/7/99 due to underlying orthopedic condition. She is currently unable to travel. If you have any questions or concerns, please don't hesitate to call.     Sincerely,        Shawanda Chi MD

## 2022-10-07 ENCOUNTER — HOSPITAL ENCOUNTER (OUTPATIENT)
Dept: PHYSICAL THERAPY | Age: 67
Setting detail: THERAPIES SERIES
End: 2022-10-07
Payer: MEDICARE

## 2022-10-12 ENCOUNTER — HOSPITAL ENCOUNTER (OUTPATIENT)
Dept: PHYSICAL THERAPY | Age: 67
Setting detail: THERAPIES SERIES
Discharge: HOME | End: 2022-10-12
Payer: MEDICARE

## 2022-10-12 PROCEDURE — 97110 THERAPEUTIC EXERCISES: CPT

## 2022-10-12 PROCEDURE — 97140 MANUAL THERAPY 1/> REGIONS: CPT

## 2022-10-12 NOTE — FLOWSHEET NOTE
[facility], [address] [place]  Phone: [phone]  Fax [phone]    Physical Therapy Daily Treatment Note  Date:  10/12/2022    Patient Name:  Cesar Griffith    :  1955  MRN: [id number]  Restrictions/Precautions:    Physician Information:   Dr. Niels Melgoza  Medical/Treatment Diagnosis Information:  Diagnosis: M17.12 Primary osteoarthritis of left knee;  Treatment Diagnosis: M25.562 Left knee pain; M54.50 Lumbar spine pain   [x] Conservative / [] Surgical - DOS:  Therapy Diagnosis/Practice Pattern:  Practice Pattern A: Primary Prevention  Insurance/Certification information:  PT Insurance Information: /[facility]  Plan of care signed: [] YES  [] NO  Number of Comorbidities:  []0     []1-2    [x]3+  Date of Patient follow up with Physician:     Is this a Progress Report:     []  Yes  [x]  No        If Yes:  Date Range for reporting period:  Beginning 2022  Ending     Progress report will be due (10 Rx or 30 days whichever is less):        Recertification will be due (POC Duration  / 90 days whichever is less): 2022      Latex Allergy:  [x]NO      []YES  Preferred Language for Healthcare:   [x]English       []other:    Visit # Insurance Allowable   5 BMN  auth []no        []yes:     SUBJECTIVE:  Pt reports knees and thigh doing better. Still having sciatic pain.     OBJECTIVE:   Observation:  Palpation:    Test used Initial score Current Score   VAS     FOTO     flexion     extension     quad     ABD     flexion          RESTRICTIONS/PRECAUTIONS:     Exercises/Interventions:     Therapeutic Ex/NMR  Sets/reps Notes   HS floss 15 x 5\"    Piriformis stretch 3 x 15\"    LTR 15 x    3 way clam  HL march  Bridge with band 20 x  20 x  20 x OVL  OVL  OVL   SLR 2 x 10    [place] 15 x  OVL   Heel raise 30 x    Side stepping wall 3 big laps    FSU 15 x R/L 6\"   LSU and over 20 x R/L foam                       Pt ed anatomy, surgery, PT progression, prognosis 10 min TKR, pros/cons   Manual Intervention     PROM, gentle STM to R hip flexor, bilateral glutes/piriformis 8 min                     Access Code: Katty Schroeder  URL: [email or web address]  Date: 09/07/2022  Prepared by: Salvatore Bojorquez    Exercises  Hamstring floss - 1-2 x daily - 7 x weekly - 10 reps  Supine Piriformis Stretch with Foot on Ground - 1-2 x daily - 7 x weekly - 3 reps - 30 hold  Hooklying Clamshell with Resistance - 1-2 x daily - 7 x weekly - 2-3 sets - 10 reps  Hooklying Isometric Clamshell - 1-2 x daily - 7 x weekly - 2-3 sets - 10 reps  Supine Bridge with Resistance Band - 1-2 x daily - 7 x weekly - 2-3 sets - 10 reps  Seated Knee Extension with Resistance - 1-2 x daily - 7 x weekly - 2-3 sets - 10 reps      Therapeutic Exercise and NMR EXR  [x] (98059) Provided verbal/tactile cueing for activities related to strengthening, flexibility, endurance, ROM for improvements in LE, proximal hip, and core control with self care, mobility, lifting, ambulation.  [] (28923) Provided verbal/tactile cueing for activities related to improving balance, coordination, kinesthetic sense, posture, motor skill, proprioception  to assist with LE, proximal hip, and core control in self care, mobility, lifting, ambulation and eccentric single leg control.      NMR and Therapeutic Activities:    [x] (92062 or 03102) Provided verbal/tactile cueing for activities related to improving balance, coordination, kinesthetic sense, posture, motor skill, proprioception and motor activation to allow for proper function of core, proximal hip and LE with self care and ADLs  [] (05423) Gait Re-education- Provided training and instruction to the patient for proper LE, core and proximal hip recruitment and positioning and eccentric body weight control with ambulation re-education including up and down stairs     Home Exercise Program:    [x] (67829) Reviewed/Progressed HEP activities related to strengthening, flexibility, endurance, ROM of core, proximal hip and LE for functional self-care, mobility, lifting and ambulation/stair navigation   [] (23790)Reviewed/Progressed HEP activities related to improving balance, coordination, kinesthetic sense, posture, motor skill, proprioception of core, proximal hip and LE for self care, mobility, lifting, and ambulation/stair navigation      Manual Treatments:  PROM / STM / Oscillations-Mobs:  G-I, II, III, IV (PA's, Inf., Post.)  [x] (64866) Provided manual therapy to mobilize LE, proximal hip and/or LS spine soft tissue/joints for the purpose of modulating pain, promoting relaxation,  increasing ROM, reducing/eliminating soft tissue swelling/inflammation/restriction, improving soft tissue extensibility and allowing for proper ROM for normal function with self care, mobility, lifting and ambulation. Modalities:       [] GR/ESU 15 min    [] GR 15 min  [] ESU     [] CP    [] MHP    [] declined     Charges:  Timed Code Treatment Minutes: 40   Total Treatment Minutes: 50      EVAL (LOW) 10567 (typically 20 minutes face-to-face)  [] EVAL (MOD) 54056 (typically 30 minutes face-to-face)  [] EVAL (HIGH) 07207 (typically 45 minutes face-to-face)  [] RE-EVAL     [x] HS(81653) x 2    [] IONTO  [] NMR (38697) x     [x] VASO  [x] Manual (10531) x 1      [] Other:  [] TA x      [] Mech Traction (21434)  [] ES(attended) (57396)      [] ES (un) (46933):     GOALS: Patient stated goal: Reduce pain in back and knee  [] Progressing: [] Met: [] Not Met: [] Adjusted    Therapist goals for Patient:   Short Term Goals: To be achieved in: 2 weeks  1. Independent in HEP and progression per patient tolerance, in order to prevent re-injury. [] Progressing: [x] Met: [] Not Met: [] Adjusted  2. Patient will have a decrease in pain to facilitate improvement in movement, function, and ADLs as indicated by Functional Deficits.   [] Progressing: [x] Met: [] Not Met: [] Adjusted    Long Term Goals: To be achieved in: 12 weeks  1. Disability index score of 66 or greater on FOTO outcome measure to assist with reaching prior level of function. [] Progressing: [] Met: [] Not Met: [] Adjusted  2. Patient will demonstrate increased AROM to 0-130 pain free to allow for proper joint functioning as indicated by patients Functional Deficits. [] Progressing: [] Met: [] Not Met: [] Adjusted  3. Patient will demonstrate an increase in Strength to good proximal hip strength and control, at least 4+/5 throughout LE to allow for proper functional mobility as indicated by patients Functional Deficits. [] Progressing: [] Met: [] Not Met: [] Adjusted  4. Patient will return to walking from parking lot to stadium for [employer] games without increased symptoms or restriction. [] Progressing: [] Met: [] Not Met: [] Adjusted  5. Patient will be able to ascend/descend stairs with reciprocal pattern for return to normal ADLs   [] Progressing: [] Met: [] Not Met: [] Adjusted      Overall Progression Towards Functional goals/ Treatment Progress Update:  [x] Patient is progressing as expected towards functional goals listed. [] Progression is slowed due to complexities/Impairments listed. [] Progression has been slowed due to co-morbidities. [] Plan just implemented, too soon to assess goals progression <30days   [] Goals require adjustment due to lack of progress  [] Patient is not progressing as expected and requires additional follow up with physician  [] Other    Prognosis for POC: [x] Good [] Fair  [] Poor      Patient requires continued skilled intervention: [x] Yes  [] No      ASSESSMENT:  Patient with bilateral hamstring cramping/sciatic symptoms following manual therapy and table exercises. Difficult time describing symptoms.      Treatment/Activity Tolerance:  [x] Patient tolerated treatment well [] Patient limited by fatigue  [] Patient limited by pain  [] Patient limited by other medical complications  [] Other: PLAN: See eval  [] Continue per plan of care [] Alter current plan (see comments above)  [x] Plan of care initiated [] Hold pending MD visit [] Discharge      Electronically signed by:  Ellen Sidhu, PT , DPT [id number]    Note: If patient does not return for scheduled/ recommended follow up visits, this note will serve as a discharge from care along with most recent update on progress.

## 2022-10-19 ENCOUNTER — HOSPITAL ENCOUNTER (OUTPATIENT)
Dept: PHYSICAL THERAPY | Age: 67
Setting detail: THERAPIES SERIES
Discharge: HOME | End: 2022-10-19
Payer: MEDICARE

## 2022-10-19 PROCEDURE — 97110 THERAPEUTIC EXERCISES: CPT

## 2022-10-19 PROCEDURE — 97140 MANUAL THERAPY 1/> REGIONS: CPT

## 2022-10-19 NOTE — FLOWSHEET NOTE
[facility], [address]  Phone: [phone]  Fax [phone]    Physical Therapy Daily Treatment Note  Date:  10/19/2022    Patient Name:  Saira Stout    :  1955  MRN: [id number]  Restrictions/Precautions:    Physician Information:   Dr. Stef Conrad  Medical/Treatment Diagnosis Information:  Diagnosis: M17.12 Primary osteoarthritis of left knee;  Treatment Diagnosis: M25.562 Left knee pain; M54.50 Lumbar spine pain   [x] Conservative / [] Surgical - DOS:  Therapy Diagnosis/Practice Pattern:  Practice Pattern A: Primary Prevention  Insurance/Certification information:  PT Insurance Information: /[facility]  Plan of care signed: [] YES  [] NO  Number of Comorbidities:  []0     []1-2    [x]3+  Date of Patient follow up with Physician:     Is this a Progress Report:     []  Yes  [x]  No        If Yes:  Date Range for reporting period:  Beginning 2022  Ending     Progress report will be due (10 Rx or 30 days whichever is less): 09/3/3231       Recertification will be due (POC Duration  / 90 days whichever is less): 2022      Latex Allergy:  [x]NO      []YES  Preferred Language for Healthcare:   [x]English       []other:    Visit # Insurance Allowable   6 BMN  auth []no        []yes:     SUBJECTIVE:  Pt reports feeling better since last session. Not hurting as much today.     OBJECTIVE:   Observation:  Palpation:    Test used Initial score Current Score   VAS     FOTO     flexion     extension     quad     ABD     flexion          RESTRICTIONS/PRECAUTIONS:     Exercises/Interventions:     Therapeutic Ex/NMR  Sets/reps Notes   HS floss 15 x 5\"    Piriformis stretch 3 x 15\"    LTR 15 x    3 way clam  HL march  Bridge with band 20 x  20 x  20 x OVL  OVL  OVL   SLR 2 x 10    [place] 15 x  OVL   Heel raise 30 x    Side stepping wall 3 big laps    FSU 15 x R/L 6\"   LSU and over 20 x R/L foam Pt ed anatomy, surgery, PT progression, prognosis 10 min TKR, pros/cons   Manual Intervention     PROM, gentle STM to R hip flexor, bilateral glutes/piriformis 8 min                     Access Code: Yvan Boyle  URL: [email or web address]  Date: 09/07/2022  Prepared by: Jahaira Shah    Exercises  Hamstring floss - 1-2 x daily - 7 x weekly - 10 reps  Supine Piriformis Stretch with Foot on Ground - 1-2 x daily - 7 x weekly - 3 reps - 30 hold  Hooklying Clamshell with Resistance - 1-2 x daily - 7 x weekly - 2-3 sets - 10 reps  Hooklying Isometric Clamshell - 1-2 x daily - 7 x weekly - 2-3 sets - 10 reps  Supine Bridge with Resistance Band - 1-2 x daily - 7 x weekly - 2-3 sets - 10 reps  Seated Knee Extension with Resistance - 1-2 x daily - 7 x weekly - 2-3 sets - 10 reps      Therapeutic Exercise and NMR EXR  [x] (76481) Provided verbal/tactile cueing for activities related to strengthening, flexibility, endurance, ROM for improvements in LE, proximal hip, and core control with self care, mobility, lifting, ambulation.  [] (10622) Provided verbal/tactile cueing for activities related to improving balance, coordination, kinesthetic sense, posture, motor skill, proprioception  to assist with LE, proximal hip, and core control in self care, mobility, lifting, ambulation and eccentric single leg control.      NMR and Therapeutic Activities:    [x] (28889 or 97863) Provided verbal/tactile cueing for activities related to improving balance, coordination, kinesthetic sense, posture, motor skill, proprioception and motor activation to allow for proper function of core, proximal hip and LE with self care and ADLs  [] (10193) Gait Re-education- Provided training and instruction to the patient for proper LE, core and proximal hip recruitment and positioning and eccentric body weight control with ambulation re-education including up and down stairs     Home Exercise Program:    [x] (02067) Reviewed/Progressed HEP activities related to strengthening, flexibility, endurance, ROM of core, proximal hip and LE for functional self-care, mobility, lifting and ambulation/stair navigation   [] (08888)Reviewed/Progressed HEP activities related to improving balance, coordination, kinesthetic sense, posture, motor skill, proprioception of core, proximal hip and LE for self care, mobility, lifting, and ambulation/stair navigation      Manual Treatments:  PROM / STM / Oscillations-Mobs:  G-I, II, III, IV (PA's, Inf., Post.)  [x] (31654) Provided manual therapy to mobilize LE, proximal hip and/or LS spine soft tissue/joints for the purpose of modulating pain, promoting relaxation,  increasing ROM, reducing/eliminating soft tissue swelling/inflammation/restriction, improving soft tissue extensibility and allowing for proper ROM for normal function with self care, mobility, lifting and ambulation. Modalities:       [] GR/ESU 15 min    [] GR 15 min  [] ESU     [] CP    [] MHP    [] declined     Charges:  Timed Code Treatment Minutes: 40   Total Treatment Minutes: 50      EVAL (LOW) 37059 (typically 20 minutes face-to-face)  [] EVAL (MOD) 21389 (typically 30 minutes face-to-face)  [] EVAL (HIGH) 93979 (typically 45 minutes face-to-face)  [] RE-EVAL     [x] FF(02423) x 2    [] IONTO  [] NMR (15208) x     [x] VASO  [x] Manual (59985) x 1      [] Other:  [] TA x      [] Mech Traction (29471)  [] ES(attended) (02599)      [] ES (un) (45673):     GOALS: Patient stated goal: Reduce pain in back and knee  [] Progressing: [] Met: [] Not Met: [] Adjusted    Therapist goals for Patient:   Short Term Goals: To be achieved in: 2 weeks  1. Independent in HEP and progression per patient tolerance, in order to prevent re-injury. [] Progressing: [x] Met: [] Not Met: [] Adjusted  2. Patient will have a decrease in pain to facilitate improvement in movement, function, and ADLs as indicated by Functional Deficits.   [] Progressing: [x] Met: [] Not Met: [] Adjusted    Long Term Goals: To be achieved in: 12 weeks  1. Disability index score of 66 or greater on FOTO outcome measure to assist with reaching prior level of function. [] Progressing: [] Met: [] Not Met: [] Adjusted  2. Patient will demonstrate increased AROM to 0-130 pain free to allow for proper joint functioning as indicated by patients Functional Deficits. [] Progressing: [] Met: [] Not Met: [] Adjusted  3. Patient will demonstrate an increase in Strength to good proximal hip strength and control, at least 4+/5 throughout LE to allow for proper functional mobility as indicated by patients Functional Deficits. [] Progressing: [] Met: [] Not Met: [] Adjusted  4. Patient will return to walking from parking lot to stadium for [employer] games without increased symptoms or restriction. [] Progressing: [] Met: [] Not Met: [] Adjusted  5. Patient will be able to ascend/descend stairs with reciprocal pattern for return to normal ADLs   [] Progressing: [] Met: [] Not Met: [] Adjusted      Overall Progression Towards Functional goals/ Treatment Progress Update:  [x] Patient is progressing as expected towards functional goals listed. [] Progression is slowed due to complexities/Impairments listed. [] Progression has been slowed due to co-morbidities. [] Plan just implemented, too soon to assess goals progression <30days   [] Goals require adjustment due to lack of progress  [] Patient is not progressing as expected and requires additional follow up with physician  [] Other    Prognosis for POC: [x] Good [] Fair  [] Poor      Patient requires continued skilled intervention: [x] Yes  [] No      ASSESSMENT:  Patient with bilateral hamstring cramping/sciatic symptoms following manual therapy and table exercises. Difficult time describing symptoms.      Treatment/Activity Tolerance:  [x] Patient tolerated treatment well [] Patient limited by fatigue  [] Patient limited by pain  [] Patient limited by other medical complications  [] Other:       PLAN: See eval  [] Continue per plan of care [] Alter current plan (see comments above)  [x] Plan of care initiated [] Hold pending MD visit [] Discharge      Electronically signed by:  Franky Estes PT , DPT [id number]    Note: If patient does not return for scheduled/ recommended follow up visits, this note will serve as a discharge from care along with most recent update on progress.

## 2022-11-02 ENCOUNTER — HOSPITAL ENCOUNTER (OUTPATIENT)
Dept: PHYSICAL THERAPY | Age: 67
Setting detail: THERAPIES SERIES
Discharge: HOME | End: 2022-11-02
Payer: MEDICARE

## 2022-11-02 PROCEDURE — 97140 MANUAL THERAPY 1/> REGIONS: CPT

## 2022-11-02 PROCEDURE — 97110 THERAPEUTIC EXERCISES: CPT

## 2022-11-02 NOTE — FLOWSHEET NOTE
[facility], [address]  Phone: [phone]  Fax [phone]    Physical Therapy Daily Treatment Note  Date:  2022    Patient Name:  Charlene Naqvi    :  1955  MRN: [id number]  Restrictions/Precautions:    Physician Information:   Dr. Ryan Naqvi  Medical/Treatment Diagnosis Information:  Diagnosis: M17.12 Primary osteoarthritis of left knee;  Treatment Diagnosis: M25.562 Left knee pain; M54.50 Lumbar spine pain   [x] Conservative / [] Surgical - DOS:  Therapy Diagnosis/Practice Pattern:  Practice Pattern A: Primary Prevention  Insurance/Certification information:  PT Insurance Information: /[facility]  Plan of care signed: [] YES  [] NO  Number of Comorbidities:  []0     []1-2    [x]3+  Date of Patient follow up with Physician:     Is this a Progress Report:     []  Yes  [x]  No        If Yes:  Date Range for reporting period:  Beginning 2022  Ending     Progress report will be due (10 Rx or 30 days whichever is less): 3213       Recertification will be due (POC Duration  / 90 days whichever is less): 2022      Latex Allergy:  [x]NO      []YES  Preferred Language for Healthcare:   [x]English       []other:    Visit # Insurance Allowable   7 BMN  auth []no        []yes:     SUBJECTIVE:  Pt reports she is feeling better today. She says her knee feels the same and has been having a lot of pain in the calf and muscle spasms within the ankle while sleeping. The spasms slowly onsets, lasts for 10 minutes, then is just sore after. She notes that occasionally she will be walking and her RLE will \"give out\" and knee will collapse inward.      OBJECTIVE:   Observation:  Palpation:    Test used Initial score Current Score   VAS     FOTO     flexion     extension     quad     ABD     flexion          RESTRICTIONS/PRECAUTIONS:     Exercises/Interventions:     Therapeutic Ex/NMR  Sets/reps Notes   HS floss 15 x 5\"    Piriformis stretch 3 x 20\"    LTR x20    Bridge with band  Deadbug with ball 2x10  2x10 RTB  Hooklying, LE   SLR 2 x 10    [place] 2x10 RTB   Multifidis walkouts 1x5 ea dir Red Band    Side stepping 2x10 Not completed due to pain   FSU 15 x R/L 6\" on R, 4\" on L   Banded seated hip IR/ER 2x8 ea dir RTB                  Pt ed anatomy, surgery, PT progression, prognosis Manual Intervention     PROM, gentle STM to R hip flexor, bilateral glutes/piriformis 10 min                     Access Code: Aggie Washburn  URL: [email or web address]  Date: 09/07/2022  Prepared by: Kelly Vasques    Exercises  Hamstring floss - 1-2 x daily - 7 x weekly - 10 reps  Supine Piriformis Stretch with Foot on Ground - 1-2 x daily - 7 x weekly - 3 reps - 30 hold  Hooklying Clamshell with Resistance - 1-2 x daily - 7 x weekly - 2-3 sets - 10 reps  Hooklying Isometric Clamshell - 1-2 x daily - 7 x weekly - 2-3 sets - 10 reps  Supine Bridge with Resistance Band - 1-2 x daily - 7 x weekly - 2-3 sets - 10 reps  Seated Knee Extension with Resistance - 1-2 x daily - 7 x weekly - 2-3 sets - 10 reps      Therapeutic Exercise and NMR EXR  [x] (76723) Provided verbal/tactile cueing for activities related to strengthening, flexibility, endurance, ROM for improvements in LE, proximal hip, and core control with self care, mobility, lifting, ambulation.  [] (89637) Provided verbal/tactile cueing for activities related to improving balance, coordination, kinesthetic sense, posture, motor skill, proprioception  to assist with LE, proximal hip, and core control in self care, mobility, lifting, ambulation and eccentric single leg control.      NMR and Therapeutic Activities:    [x] (14847 or 90763) Provided verbal/tactile cueing for activities related to improving balance, coordination, kinesthetic sense, posture, motor skill, proprioception and motor activation to allow for proper function of core, proximal hip and LE with self care and ADLs  [] (30327) Gait Re-education- Provided training and instruction to the patient for proper LE, core and proximal hip recruitment and positioning and eccentric body weight control with ambulation re-education including up and down stairs     Home Exercise Program:    [x] (17687) Reviewed/Progressed HEP activities related to strengthening, flexibility, endurance, ROM of core, proximal hip and LE for functional self-care, mobility, lifting and ambulation/stair navigation   [] (79133)Reviewed/Progressed HEP activities related to improving balance, coordination, kinesthetic sense, posture, motor skill, proprioception of core, proximal hip and LE for self care, mobility, lifting, and ambulation/stair navigation      Manual Treatments:  PROM / STM / Oscillations-Mobs:  G-I, II, III, IV (PA's, Inf., Post.)  [x] (78834) Provided manual therapy to mobilize LE, proximal hip and/or LS spine soft tissue/joints for the purpose of modulating pain, promoting relaxation,  increasing ROM, reducing/eliminating soft tissue swelling/inflammation/restriction, improving soft tissue extensibility and allowing for proper ROM for normal function with self care, mobility, lifting and ambulation. Modalities:       [] GR/ESU 15 min    [] GR 15 min  [] ESU     [] CP    [] MHP    [] declined     Charges:  Timed Code Treatment Minutes: 40   Total Treatment Minutes: 40      EVAL (LOW) 61733 (typically 20 minutes face-to-face)  [] EVAL (MOD) 99137 (typically 30 minutes face-to-face)  [] EVAL (HIGH) 41252 (typically 45 minutes face-to-face)  [] RE-EVAL     [x] IO(61351) x 2    [] IONTO  [] NMR (53947) x     [] VASO  [x] Manual (94348) x 1      [] Other:  [] TA x      [] Mech Traction (94860)  [] ES(attended) (49227)      [] ES (un) (59308):     GOALS: Patient stated goal: Reduce pain in back and knee  [] Progressing: [] Met: [] Not Met: [] Adjusted    Therapist goals for Patient:   Short Term Goals: To be achieved in: 2 weeks  1.  Independent in HEP and progression per patient tolerance, in order to prevent re-injury. [] Progressing: [x] Met: [] Not Met: [] Adjusted  2. Patient will have a decrease in pain to facilitate improvement in movement, function, and ADLs as indicated by Functional Deficits. [] Progressing: [x] Met: [] Not Met: [] Adjusted    Long Term Goals: To be achieved in: 12 weeks  1. Disability index score of 66 or greater on FOTO outcome measure to assist with reaching prior level of function. [x] Progressing: [] Met: [] Not Met: [] Adjusted  2. Patient will demonstrate increased AROM to 0-130 pain free to allow for proper joint functioning as indicated by patients Functional Deficits. [x] Progressing: [] Met: [] Not Met: [] Adjusted  3. Patient will demonstrate an increase in Strength to good proximal hip strength and control, at least 4+/5 throughout LE to allow for proper functional mobility as indicated by patients Functional Deficits. [] Progressing: [] Met: [] Not Met: [] Adjusted  4. Patient will return to walking from parking lot to stadium for [employer] games without increased symptoms or restriction. [x] Progressing: [] Met: [] Not Met: [] Adjusted  5. Patient will be able to ascend/descend stairs with reciprocal pattern for return to normal ADLs   [x] Progressing: [] Met: [] Not Met: [] Adjusted      Overall Progression Towards Functional goals/ Treatment Progress Update:  [x] Patient is progressing as expected towards functional goals listed. [] Progression is slowed due to complexities/Impairments listed. [] Progression has been slowed due to co-morbidities.   [] Plan just implemented, too soon to assess goals progression <30days   [] Goals require adjustment due to lack of progress  [] Patient is not progressing as expected and requires additional follow up with physician  [] Other    Prognosis for POC: [x] Good [] Fair  [] Poor      Patient requires continued skilled intervention: [x] Yes  [] No      ASSESSMENT:  Patient unable to do SLR due to increased pain in anterior thigh throughout entire range of motion bilaterally. Pt unable to do quadruped rockbacks due to inability to be in quadruped position due to L knee pain. Multifidis walkouts were done instead without reports of pain or discomfort. Pt unable to finish side stepping with band due to increase pain on lateral aspect of RLE. Pt tolerated today's session with moderate lateral RLE and anterior bilateral LE pain during most exercises. Plan to progress low back stabilizers and hip strengthening. Treatment/Activity Tolerance:  [x] Patient tolerated treatment well [] Patient limited by fatigue  [x] Patient limited by pain  [] Patient limited by other medical complications  [] Other:       PLAN: See eval  [] Continue per plan of care [] Alter current plan (see comments above)  [x] Plan of care initiated [] Hold pending MD visit [] Discharge      Electronically signed by:  Hallie Ponce, PT , DPT [id number]. Mark Erp, SPT     Note: If patient does not return for scheduled/ recommended follow up visits, this note will serve as a discharge from care along with most recent update on progress.

## 2022-11-07 ENCOUNTER — OFFICE VISIT (OUTPATIENT)
Dept: ORTHOPEDIC SURGERY | Age: 67
End: 2022-11-07
Payer: MEDICARE

## 2022-11-07 VITALS — WEIGHT: 195 LBS | BODY MASS INDEX: 29.55 KG/M2 | HEIGHT: 68 IN

## 2022-11-07 DIAGNOSIS — M54.16 LUMBAR RADICULITIS: ICD-10-CM

## 2022-11-07 DIAGNOSIS — M51.36 DDD (DEGENERATIVE DISC DISEASE), LUMBAR: ICD-10-CM

## 2022-11-07 DIAGNOSIS — M43.16 SPONDYLOLISTHESIS OF LUMBAR REGION: Primary | ICD-10-CM

## 2022-11-07 PROCEDURE — 1036F TOBACCO NON-USER: CPT | Performed by: PHYSICIAN ASSISTANT

## 2022-11-07 PROCEDURE — G8484 FLU IMMUNIZE NO ADMIN: HCPCS | Performed by: PHYSICIAN ASSISTANT

## 2022-11-07 PROCEDURE — G8400 PT W/DXA NO RESULTS DOC: HCPCS | Performed by: PHYSICIAN ASSISTANT

## 2022-11-07 PROCEDURE — 1090F PRES/ABSN URINE INCON ASSESS: CPT | Performed by: PHYSICIAN ASSISTANT

## 2022-11-07 PROCEDURE — 3017F COLORECTAL CA SCREEN DOC REV: CPT | Performed by: PHYSICIAN ASSISTANT

## 2022-11-07 PROCEDURE — 1123F ACP DISCUSS/DSCN MKR DOCD: CPT | Performed by: PHYSICIAN ASSISTANT

## 2022-11-07 PROCEDURE — G8417 CALC BMI ABV UP PARAM F/U: HCPCS | Performed by: PHYSICIAN ASSISTANT

## 2022-11-07 PROCEDURE — 99204 OFFICE O/P NEW MOD 45 MIN: CPT | Performed by: PHYSICIAN ASSISTANT

## 2022-11-07 PROCEDURE — G8427 DOCREV CUR MEDS BY ELIG CLIN: HCPCS | Performed by: PHYSICIAN ASSISTANT

## 2022-11-07 RX ORDER — MELOXICAM 15 MG/1
15 TABLET ORAL DAILY PRN
Qty: 30 TABLET | Refills: 0 | Status: SHIPPED | OUTPATIENT
Start: 2022-11-07 | End: 2022-12-07

## 2022-11-07 NOTE — PROGRESS NOTES
New Patient: Belgica Garcia    2022     CHIEF COMPLAINT:    Chief Complaint   Patient presents with    New Patient     NP/LOW BACK PAIN/REF BY DR. BEAULIEU       HISTORY OF PRESENT ILLNESS:              The patient is a 69 y.o. female history of DM, anxiety, hypertension referred by Dr. Xavier Dawson MD for low back and right leg pain. She reports a history of chronic aching low back pain extending into the right buttock, anterior lateral thigh/calf. Her symptoms flared up and became fairly severe earlier this year. Pain is typically increased with any prolonged walking, activity or driving. She had a flareup of symptoms after driving down to [place]. She reports some relief with Advil, ice, heat. Conservative care includes physical therapy, NSAIDs, Tylenol, MDP. Denies any progressive extremity weakness although states at times she feels that her right leg will \"give out\". Denies any recent bowel or bladder dysfunction or saddle anesthesia. Denies any recent fevers chills or infections. Currently denies any upper extremity radiating symptoms. No fine motor difficulty gait instability. No recent injury or trauma. No recent fevers chills or infections. The pain assessment was noted & reviewed in the medical record today.      Current/Past Treatment:   Physical Therapy: Yes  Chiropractic:     Injection:   Euflexxa  Medications:            NSAIDS: Ibuprofen, Advil            Muscle relaxer:              Steriods: MDP            Neuropathic medications:              Opioids:            Other:   Surgery/Consult: No    Work Status/Functionality:     Past Medical History: Medical history form was reviewed today & scanned into the media tab  Past Medical History:   Diagnosis Date    Anxiety     severe    DM2 (diabetes mellitus, type 2) ([address]     Hypertension     white coat component    Ocular migraine     Spontaneous vaginal delivery         Varicella       Past Surgical History:     Past Surgical History: Procedure Laterality Date    HYSTERECTOMY, VAGINAL      due to prolapsed uterus (ovaries intact)    INGUINAL HERNIA REPAIR      right     Current Medications:     Current Outpatient Medications:     ibuprofen (ADVIL;MOTRIN) 800 MG tablet, Take 1 tablet by mouth every 8 hours as needed for Pain, Disp: 90 tablet, Rfl: 3    methylPREDNISolone (MEDROL DOSEPACK) 4 MG tablet, Take by mouth as directed., Disp: 21 kit, Rfl: 0    ibuprofen (ADVIL;MOTRIN) 800 MG tablet, Take 1 tablet by mouth every 8 hours as needed for Pain, Disp: 90 tablet, Rfl: 3    lisinopril (PRINIVIL;ZESTRIL) 40 MG tablet, TAKE 1 TABLET BY MOUTH  DAILY, Disp: 90 tablet, Rfl: 3    ibuprofen (ADVIL;MOTRIN) 600 MG tablet, , Disp: , Rfl:     fluconazole (DIFLUCAN) 150 MG tablet, Take 1 tablet every 72 hours if needed for yeast infection, Disp: 6 tablet, Rfl: 3  Allergies:  Metformin and related  Social History:    reports that she has never smoked. She has never used smokeless tobacco. She reports current alcohol use. She reports that she does not use drugs. Family History:   Family History   Problem Relation Age of Onset    High Blood Pressure Mother     Diabetes Mother     High Cholesterol Mother     Dementia Mother     Early Death Father         suicide    Depression Father        REVIEW OF SYSTEMS: Full ROS reviewed & scanned into chart  CONSTITUTIONAL: Denies unexplained weight loss, fevers   SKIN: Denies active skin conditions   ENT: Denies dizziness, nosebleeds  RESPIRATORY: Denies difficulty breathing  CARDIOVASCULAR: Denies new chest pain   NEUROLOGICAL: Denies progressive weakness  PSYCHOLOGICAL: Denies anxiety, depression   HEMATOLOGIC: Denies blood disorders, cancer  ENDOCRINE: Denies excessive thirst, heat/cold  GI: Denies current nausea, vomiting, diarrhea   : Denies new bowel or bladder incontinence       PHYSICAL EXAM:    Vitals: not currently breastfeeding.     11/7/2022 8:56 AM   Weight 195 lb (88.5 kg)   Height 5' 8\" (1.727 m)   Pain Score 1   Pain Loc Back   Pain Edu? Yes       GENERAL EXAM:  General Apparence: Patient is adequately groomed with no evidence of malnutrition. Orientation: The patient is oriented to time, place and person. Mood & Affect:The patient's mood and affect are appropriate   Vascular: Examination reveals no swelling tenderness in upper or lower extremities. Good capillary refill  Lymphatic: The lymphatic examination bilaterally reveals all areas to be without enlargement or induration  Sensation: Sensation is intact without deficit  Coordination/Balance: Good coordination   LUMBAR/SACRAL EXAMINATION:  Inspection: Local inspection shows no step-off or bruising. Lumbar alignment is normal.  Sagittal and Coronal balance is neutral.      Palpation:   No evidence of tenderness at the midline. Range of Motion: Mild to moderate loss of flexion and extension--reproduction of radiating right leg pain with extension  Strength:   Strength testing is 5/5 in all muscle groups tested. Special Tests:   Straight leg raise and crossed SLR negative. Leg length and pelvis level.  0 out of 5 Sarmad's signs. Skin: There are no rashes, ulcerations or lesions. Reflexes: Reflexes are symmetrically slightly brisk but symmetric 2-3+ at the patellar and 2+ ankle tendons. Clonus absent bilaterally at the feet. Gait & station: Mildly antalgic unassisted  Additional Examinations:   RIGHT LOWER EXTREMITY: Inspection/examination of the right lower extremity does not show any tenderness, deformity or injury. Range of motion is full. There is no gross instability. There are no rashes, ulcerations or lesions. Strength and tone are normal.    LEFT LOWER EXTREMITY:  Inspection/examination of the left lower extremity does not show any tenderness, deformity or injury. Range of motion is full. There is no gross instability. There are no rashes, ulcerations or lesions.   Strength and tone are normal.    Diagnostic Testin views lumbar spine 11/7/2022 L4-5 spondylolisthesis multilevel moderate to severe DDD/spondylosis, no high-grade instability on flexion-extension. Multilevel anterior wedging with spurring. Lumbar x-rays May 2021 reviewed L4-5 spondylolisthesis, multilevel moderate to severe DDD/spondylosis    Dr. Cecille Flores notes reviewed    Labs reviewed May 2021 normal BUN and creatinine, December 2021 hemoglobin A1c 9.2--she states May 2022 hemoglobin A1c was 7.1        Impression:  1) Chronic LBP  2) Chronic right lumbar radiculitis  3) L4-5 spondylolisthesis, multilevel moderate to severe DDD  4) Knee pain--Dr. Vegas   5) DM, claustrophobia      Plan:   1) We had a long discussion. We obtained and reviewed 4 views of her lumbar spine today and discussed in detail comparing to her prior imaging. I do recommend lumbar MRI which may be performed in an open scanner due to claustrophobia.     2) PT for above--gait training    3) Mobic 15mg I po qd PRN--DC other NSAIDs during, side effects/risk discussed    4) She has a brace to wear PRN    5) Discussed concerning signs and symptoms    6) F/u to review L MRI, direct # provided            Brijesh Gonsales PA-C, MPAS  Board Certified by the [facility]

## 2022-11-09 ENCOUNTER — HOSPITAL ENCOUNTER (OUTPATIENT)
Dept: PHYSICAL THERAPY | Age: 67
Setting detail: THERAPIES SERIES
Discharge: HOME | End: 2022-11-09
Payer: MEDICARE

## 2022-11-09 PROCEDURE — 97110 THERAPEUTIC EXERCISES: CPT

## 2022-11-09 PROCEDURE — 97140 MANUAL THERAPY 1/> REGIONS: CPT

## 2022-11-09 NOTE — FLOWSHEET NOTE
[facility], [address] [place]  Phone: [phone]  Fax [phone]    Physical Therapy Daily Treatment Note  Date:  2022    Patient Name:  Dana Lua    :  1955  MRN: [id number]  Restrictions/Precautions:    Physician Information:   Dr. Hali Alfonso  Medical/Treatment Diagnosis Information:  Diagnosis: M17.12 Primary osteoarthritis of left knee;  Treatment Diagnosis: M25.562 Left knee pain; M54.50 Lumbar spine pain   [x] Conservative / [] Surgical - DOS:  Therapy Diagnosis/Practice Pattern:  Practice Pattern A: Primary Prevention  Insurance/Certification information:  PT Insurance Information: /[facility]  Plan of care signed: [] YES  [] NO  Number of Comorbidities:  []0     []1-2    [x]3+  Date of Patient follow up with Physician:     Is this a Progress Report:     []  Yes  [x]  No        If Yes:  Date Range for reporting period:  Beginning 2022  Ending     Progress report will be due (10 Rx or 30 days whichever is less):        Recertification will be due (POC Duration  / 90 days whichever is less): 2022      Latex Allergy:  [x]NO      []YES  Preferred Language for Healthcare:   [x]English       []other:    Visit # Insurance Allowable   8 BMN  auth []no        []yes:     SUBJECTIVE:  Pt reports no MRI, claustrophobic and cant find location with open MRI. Was told the way she walks may contribute to back issues.      OBJECTIVE:   Observation:  Palpation:    Test used Initial score Current Score   VAS     FOTO     flexion     extension     quad     ABD     flexion          RESTRICTIONS/PRECAUTIONS:     Exercises/Interventions:     Therapeutic Ex/NMR  Sets/reps Notes   HS floss 15 x 5\"    Piriformis stretch 3 x 20\"    LTR x20    Bridge with band  Deadbug with ball 2x10  2x10 RTB  Hooklying, LE   SLR 2 x 10    [place] 2x10 RTB   Multifidis walkouts 1x5 ea dir Red Band    Side stepping Not completed due to pain   FSU 15 x R/L 6\" on R, 4\" on L   Banded seated hip IR/ER 2x8 ea dir RTB                  Pt ed anatomy, surgery, PT progression, prognosis 10 min Continued discussions on lateral sway with gait impacting spine but minimal correction possible due to knee    Manual Intervention     PROM, gentle STM to R hip flexor, bilateral glutes/piriformis 10 min                     Access Code: [email or web address]  URL: [email or web address]  Date: 09/07/2022  Prepared by: Kaveh Chirinos    Exercises  Hamstring floss - 1-2 x daily - 7 x weekly - 10 reps  Supine Piriformis Stretch with Foot on Ground - 1-2 x daily - 7 x weekly - 3 reps - 30 hold  Hooklying Clamshell with Resistance - 1-2 x daily - 7 x weekly - 2-3 sets - 10 reps  Hooklying Isometric Clamshell - 1-2 x daily - 7 x weekly - 2-3 sets - 10 reps  Supine Bridge with Resistance Band - 1-2 x daily - 7 x weekly - 2-3 sets - 10 reps  Seated Knee Extension with Resistance - 1-2 x daily - 7 x weekly - 2-3 sets - 10 reps      Therapeutic Exercise and NMR EXR  [x] (13029) Provided verbal/tactile cueing for activities related to strengthening, flexibility, endurance, ROM for improvements in LE, proximal hip, and core control with self care, mobility, lifting, ambulation.  [] (32103) Provided verbal/tactile cueing for activities related to improving balance, coordination, kinesthetic sense, posture, motor skill, proprioception  to assist with LE, proximal hip, and core control in self care, mobility, lifting, ambulation and eccentric single leg control.      NMR and Therapeutic Activities:    [x] (84365 or 20226) Provided verbal/tactile cueing for activities related to improving balance, coordination, kinesthetic sense, posture, motor skill, proprioception and motor activation to allow for proper function of core, proximal hip and LE with self care and ADLs  [] (17524) Gait Re-education- Provided training and instruction to the patient for proper LE, core and proximal hip recruitment and positioning and eccentric body weight control with ambulation re-education including up and down stairs     Home Exercise Program:    [x] (82508) Reviewed/Progressed HEP activities related to strengthening, flexibility, endurance, ROM of core, proximal hip and LE for functional self-care, mobility, lifting and ambulation/stair navigation   [] (16003)Reviewed/Progressed HEP activities related to improving balance, coordination, kinesthetic sense, posture, motor skill, proprioception of core, proximal hip and LE for self care, mobility, lifting, and ambulation/stair navigation      Manual Treatments:  PROM / STM / Oscillations-Mobs:  G-I, II, III, IV (PA's, Inf., Post.)  [x] (03086) Provided manual therapy to mobilize LE, proximal hip and/or LS spine soft tissue/joints for the purpose of modulating pain, promoting relaxation,  increasing ROM, reducing/eliminating soft tissue swelling/inflammation/restriction, improving soft tissue extensibility and allowing for proper ROM for normal function with self care, mobility, lifting and ambulation. Modalities:       [] GR/ESU 15 min    [] GR 15 min  [] ESU     [] CP    [] MHP    [] declined     Charges:  Timed Code Treatment Minutes: 40   Total Treatment Minutes: 40      EVAL (LOW) 37858 (typically 20 minutes face-to-face)  [] EVAL (MOD) 47961 (typically 30 minutes face-to-face)  [] EVAL (HIGH) 28522 (typically 45 minutes face-to-face)  [] RE-EVAL     [x] EO(82987) x 2    [] IONTO  [] NMR (34692) x     [] VASO  [x] Manual (50778) x 1      [] Other:  [] TA x      [] Mech Traction (38704)  [] ES(attended) (22149)      [] ES (un) (36765):     GOALS: Patient stated goal: Reduce pain in back and knee  [] Progressing: [] Met: [] Not Met: [] Adjusted    Therapist goals for Patient:   Short Term Goals: To be achieved in: 2 weeks  1. Independent in HEP and progression per patient tolerance, in order to prevent re-injury.    [] Progressing: [x] Met: [] Not Met: [] Adjusted  2. Patient will have a decrease in pain to facilitate improvement in movement, function, and ADLs as indicated by Functional Deficits. [] Progressing: [x] Met: [] Not Met: [] Adjusted    Long Term Goals: To be achieved in: 12 weeks  1. Disability index score of 66 or greater on FOTO outcome measure to assist with reaching prior level of function. [x] Progressing: [] Met: [] Not Met: [] Adjusted  2. Patient will demonstrate increased AROM to 0-130 pain free to allow for proper joint functioning as indicated by patients Functional Deficits. [x] Progressing: [] Met: [] Not Met: [] Adjusted  3. Patient will demonstrate an increase in Strength to good proximal hip strength and control, at least 4+/5 throughout LE to allow for proper functional mobility as indicated by patients Functional Deficits. [] Progressing: [] Met: [] Not Met: [] Adjusted  4. Patient will return to walking from parking lot to stadium for [employer] games without increased symptoms or restriction. [x] Progressing: [] Met: [] Not Met: [] Adjusted  5. Patient will be able to ascend/descend stairs with reciprocal pattern for return to normal ADLs   [x] Progressing: [] Met: [] Not Met: [] Adjusted      Overall Progression Towards Functional goals/ Treatment Progress Update:  [x] Patient is progressing as expected towards functional goals listed. [] Progression is slowed due to complexities/Impairments listed. [] Progression has been slowed due to co-morbidities. [] Plan just implemented, too soon to assess goals progression <30days   [] Goals require adjustment due to lack of progress  [] Patient is not progressing as expected and requires additional follow up with physician  [] Other    Prognosis for POC: [x] Good [] Fair  [] Poor      Patient requires continued skilled intervention: [x] Yes  [] No      ASSESSMENT:  Patient unable to do SLR due to increased pain in anterior thigh throughout entire range of motion bilaterally. Pt unable to do quadruped rockbacks due to inability to be in quadruped position due to L knee pain. Multifidis walkouts were done instead without reports of pain or discomfort. Pt unable to finish side stepping with band due to increase pain on lateral aspect of RLE. Pt tolerated today's session with moderate lateral RLE and anterior bilateral LE pain during most exercises. Plan to progress low back stabilizers and hip strengthening. Treatment/Activity Tolerance:  [x] Patient tolerated treatment well [] Patient limited by fatigue  [x] Patient limited by pain  [] Patient limited by other medical complications  [] Other:       PLAN: See eval  [] Continue per plan of care [] Alter current plan (see comments above)  [x] Plan of care initiated [] Hold pending MD visit [] Discharge      Electronically signed by:  Jayda Akers, PT , DPT [id number]. Mik Christy, SPT     Note: If patient does not return for scheduled/ recommended follow up visits, this note will serve as a discharge from care along with most recent update on progress.

## 2022-11-18 LAB
CHOLESTEROL, TOTAL: 189 MG/DL
CHOLESTEROL/HDL RATIO: 3.6
HDLC SERPL-MCNC: 53 MG/DL (ref 35–70)
LDL CHOLESTEROL CALCULATED: 111 MG/DL (ref 0–160)
NONHDLC SERPL-MCNC: NORMAL MG/DL
TRIGL SERPL-MCNC: 126 MG/DL
VLDLC SERPL CALC-MCNC: NORMAL MG/DL

## 2022-11-22 LAB
AVERAGE GLUCOSE: 186
HBA1C MFR BLD: 8.1 %

## 2022-11-23 ENCOUNTER — TELEPHONE (OUTPATIENT)
Dept: FAMILY MEDICINE CLINIC | Age: 67
End: 2022-11-23

## 2022-11-23 NOTE — TELEPHONE ENCOUNTER
11/23/22-- Pt requesting New Patient Appt per ECC message.  -Pt requesting to become Dr. Darlean Scheuermann patient. Please Advise for approval or not?

## 2022-11-23 NOTE — TELEPHONE ENCOUNTER
Pls let her know my pt panel is full, but we have 2 new physicians who would be happy to see her.  Oliverio.

## 2022-11-23 NOTE — TELEPHONE ENCOUNTER
----- Message from Erika Negron sent at 11/23/2022  1:57 PM EST -----  Subject: Appointment Request    Reason for Call: Roberto Nava Patient/New to Provider Appointment needed: Routine   Existing Condition Follow Up (Diabetes)    QUESTIONS    Reason for appointment request? Requested Provider unavailable - kaitlin longo     Additional Information for Provider? pt.  Mara Eddy is requesting to   see provider/get established w/Kaitlin Harris MD, amari bryant, wants   to discuss diabetes management/medication  ---------------------------------------------------------------------------  --------------  Zora Bosworth INFO  [phone]; OK to leave message on voicemail, OK to respond with   electronic message via [email or web address] portal (only for patients who have   registered [email or web address] account)  ---------------------------------------------------------------------------  --------------  SCRIPT ANSWERS  COVID Screen: Britany Rodríguez

## 2022-11-29 NOTE — TELEPHONE ENCOUNTER
PM called pt - informed her of the below - pt decided to stay with [facility] - PM entered [facility] as her pcp in field - for some reason it was showing Dr. Karel Adrian.     ______________________________  Msg from [address] [place] [address] [address]: Appointment Request     Reason for Call: Established Patient Appointment needed: New Patient   Request Appointment     QUESTIONS     Reason for appointment request? No appointments available during search       Additional Information for Provider? Patient calling to make appt with   Jace Mcardle, MD, and hopefully make Dr Haroon Crow her new PCP. She is   very found of [facility] P[facility]ta but would like a MD. Patient is currently listed as   seeing NP Michelle Peralta but states with her aging and Diabetes she would like an   MD now. Please call to discuss and book.    ---------------------------------------------------------------------------   --------------   Javid Pruett [facility]   [phone]; OK to leave message on voicemail   ---------------------------------------------------------------------------   --------------   SCRIPT ANSWERS   COVID Screen: Charles Pandya

## 2022-11-30 ENCOUNTER — HOSPITAL ENCOUNTER (OUTPATIENT)
Dept: PHYSICAL THERAPY | Age: 67
Setting detail: THERAPIES SERIES
Discharge: HOME | End: 2022-11-30
Payer: MEDICARE

## 2022-11-30 PROCEDURE — 97110 THERAPEUTIC EXERCISES: CPT

## 2022-11-30 PROCEDURE — 97140 MANUAL THERAPY 1/> REGIONS: CPT

## 2022-11-30 NOTE — FLOWSHEET NOTE
[facility], [address] [place] [address]  Phone: [phone]  Fax [phone]    Physical Therapy Re-Certification Plan of Care/MD UPDATE      Dear  Dr. Cecille Flores,    We had the pleasure of treating the following patient for physical therapy services at [address] [place] [address]. A summary of our findings can be found in the updated assessment below. This includes our plan of care. If you have any questions or concerns regarding these findings, please do not hesitate to contact me at the office phone number checked above. Thank you for the referral.     Physician Signature:________________________________Date:__________________  By signing above (or electronic signature), therapists plan is approved by physician    Date Range Of Visits: 2022-2022  Total Visits to Date: 9  Overall Response to Treatment:   []Patient is responding well to treatment and improvement is noted with regards  to goals   [x]Patient should continue to improve in reasonable time if they continue HEP   [x]Patient has plateaued and is no longer responding to skilled PT intervention    []Patient is getting worse and would benefit from return to referring MD   []Patient unable to adhere to initial POC   []Other:     Patient compliant with HEP. At this point PT management is making minimal changes. Due to patients significant varus deformity of knee patient compensates during ambulation with large lateral trunk motion which is likely contributing to symptoms. Have had lengthy discussions about knee pathology contributing to symptoms and need for surgical consult regarding knee replacement, patient currently reluctant to go down this route. Will continue to work on strengthening patient and progress to independent HEP.       Physical Therapy Daily Treatment Note  Date:  2022    Patient Name:  Evelyn Mcgovern    :  1955  MRN: [id number]  Restrictions/Precautions:    Physician Information:   Dr. Zahraa Rodriguez  Medical/Treatment Diagnosis Information:  Diagnosis: M17.12 Primary osteoarthritis of left knee;  Treatment Diagnosis: M25.562 Left knee pain; M54.50 Lumbar spine pain   [x] Conservative / [] Surgical - DOS:  Therapy Diagnosis/Practice Pattern:  Practice Pattern A: Primary Prevention  Insurance/Certification information:  PT Insurance Information: /[facility]  Plan of care signed: [] YES  [] NO  Number of Comorbidities:  []0     []1-2    [x]3+  Date of Patient follow up with Physician:     Is this a Progress Report:     []  Yes  [x]  No        If Yes:  Date Range for reporting period:  Beginning 9/7/2022  Ending     Progress report will be due (10 Rx or 30 days whichever is less): 64/4/3416       Recertification will be due (POC Duration  / 90 days whichever is less): 11/26/2022      Latex Allergy:  [x]NO      []YES  Preferred Language for Healthcare:   [x]English       []other:    Visit # Insurance Allowable   9 BMN  auth []no        []yes:     SUBJECTIVE:  Pt reports that her hip is feeling much better and her R hip and L knee are more bothersome to her. She still has not gotten an MRI since she cannot find an open MRI. OBJECTIVE:   Observation:  Palpation:    Test used Initial score Current Score   VAS     FOTO     flexion     extension     quad     ABD     flexion          RESTRICTIONS/PRECAUTIONS:     Exercises/Interventions:     Therapeutic Ex/NMR  Sets/reps Notes   HS floss 15 x 5\"    Piriformis stretch 3 x 20\"       Bridge with band  2x10  RTB  Supine Hip Adduction hold 10x10\"    SLR 2 x 8 ea     Heel raise 3x10 Standing hamstring curl 2x12         Manual Intervention     PROM, gentle STM to R hip flexor, bilateral glutes/piriformis 15 min                     Access Code: Efrain Crouch  URL: [email or web address]  Date: 09/07/2022  Prepared by: Khushi Che    Exercises  Hamstring floss - 1-2 x daily - 7 x weekly - 10 reps  Supine Piriformis Stretch with Foot on Ground - 1-2 x daily - 7 x weekly - 3 reps - 30 hold  Hooklying Clamshell with Resistance - 1-2 x daily - 7 x weekly - 2-3 sets - 10 reps  Hooklying Isometric Clamshell - 1-2 x daily - 7 x weekly - 2-3 sets - 10 reps  Supine Bridge with Resistance Band - 1-2 x daily - 7 x weekly - 2-3 sets - 10 reps  Seated Knee Extension with Resistance - 1-2 x daily - 7 x weekly - 2-3 sets - 10 reps      Therapeutic Exercise and NMR EXR  [x] (91905) Provided verbal/tactile cueing for activities related to strengthening, flexibility, endurance, ROM for improvements in LE, proximal hip, and core control with self care, mobility, lifting, ambulation.  [] (30940) Provided verbal/tactile cueing for activities related to improving balance, coordination, kinesthetic sense, posture, motor skill, proprioception  to assist with LE, proximal hip, and core control in self care, mobility, lifting, ambulation and eccentric single leg control.      NMR and Therapeutic Activities:    [x] (02650 or 40901) Provided verbal/tactile cueing for activities related to improving balance, coordination, kinesthetic sense, posture, motor skill, proprioception and motor activation to allow for proper function of core, proximal hip and LE with self care and ADLs  [] (03294) Gait Re-education- Provided training and instruction to the patient for proper LE, core and proximal hip recruitment and positioning and eccentric body weight control with ambulation re-education including up and down stairs     Home Exercise Program:    [x] (02298) Reviewed/Progressed HEP activities related to strengthening, flexibility, endurance, ROM of core, proximal hip and LE for functional self-care, mobility, lifting and ambulation/stair navigation   [] (33966)Reviewed/Progressed HEP activities related to improving balance, coordination, kinesthetic sense, posture, motor skill, proprioception of core, proximal hip and LE for self care, mobility, lifting, and ambulation/stair navigation      Manual Treatments:  PROM / STM / Oscillations-Mobs:  G-I, II, III, IV (PA's, Inf., Post.)  [x] (63584) Provided manual therapy to mobilize LE, proximal hip and/or LS spine soft tissue/joints for the purpose of modulating pain, promoting relaxation,  increasing ROM, reducing/eliminating soft tissue swelling/inflammation/restriction, improving soft tissue extensibility and allowing for proper ROM for normal function with self care, mobility, lifting and ambulation. Modalities:       [] GR/ESU 15 min    [] GR 15 min  [] ESU     [] CP    [] MHP    [] declined     Charges:  Timed Code Treatment Minutes: 40   Total Treatment Minutes: 40      EVAL (LOW) 64493 (typically 20 minutes face-to-face)  [] EVAL (MOD) 15716 (typically 30 minutes face-to-face)  [] EVAL (HIGH) 21514 (typically 45 minutes face-to-face)  [] RE-EVAL     [x] PB(90433) x 2   [] IONTO  [] NMR (98791) x     [] VASO  [x] Manual (44647) x 1      [] Other:  [] TA x      [] Mech Traction (62267)  [] ES(attended) (35929)      [] ES (un) (34343):     GOALS: Patient stated goal: Reduce pain in back and knee  [x] Progressing: [] Met: [] Not Met: [] Adjusted    Therapist goals for Patient:   Short Term Goals: To be achieved in: 2 weeks  1. Independent in HEP and progression per patient tolerance, in order to prevent re-injury. [] Progressing: [x] Met: [] Not Met: [] Adjusted  2. Patient will have a decrease in pain to facilitate improvement in movement, function, and ADLs as indicated by Functional Deficits. [] Progressing: [x] Met: [] Not Met: [] Adjusted    Long Term Goals: To be achieved in: 12 weeks  1. Disability index score of 66 or greater on FOTO outcome measure to assist with reaching prior level of function. [x] Progressing: [] Met: [] Not Met: [] Adjusted  2.  Patient will demonstrate increased AROM to 0-130 pain free to allow for proper joint functioning as indicated by patients Functional Deficits. [x] Progressing: [] Met: [] Not Met: [] Adjusted  3. Patient will demonstrate an increase in Strength to good proximal hip strength and control, at least 4+/5 throughout LE to allow for proper functional mobility as indicated by patients Functional Deficits. [] Progressing: [] Met: [] Not Met: [] Adjusted  4. Patient will return to walking from parking lot to stadium for [employer] games without increased symptoms or restriction. [x] Progressing: [] Met: [] Not Met: [] Adjusted  5. Patient will be able to ascend/descend stairs with reciprocal pattern for return to normal ADLs   [x] Progressing: [] Met: [] Not Met: [] Adjusted      Overall Progression Towards Functional goals/ Treatment Progress Update:  [x] Patient is progressing as expected towards functional goals listed. [] Progression is slowed due to complexities/Impairments listed. [] Progression has been slowed due to co-morbidities. [] Plan just implemented, too soon to assess goals progression <30days   [] Goals require adjustment due to lack of progress  [] Patient is not progressing as expected and requires additional follow up with physician  [] Other    Prognosis for POC: [x] Good [] Fair  [] Poor      Patient requires continued skilled intervention: [x] Yes  [] No      ASSESSMENT:  Patient continues to have tightness at R hip flexor and glut. She was able to complete the straight leg raise with minimal pain, decreased pain compared to last session. Patient was able to tolerate all exercises well today compared to previous session. Plan to progress LE strengthening.      Treatment/Activity Tolerance:  [x] Patient tolerated treatment well [] Patient limited by fatigue  [x] Patient limited by pain  [] Patient limited by other medical complications  [] Other:       PLAN: See eval  [] Continue per plan of care [] Alter current plan (see comments above)  [x] Plan of care initiated [] Hold pending MD visit [] Discharge      Electronically signed by: Jourdan Templeton, PT , DPT [id number]. Ilia Boyd, SPT     Note: If patient does not return for scheduled/ recommended follow up visits, this note will serve as a discharge from care along with most recent update on progress.

## 2022-12-07 ENCOUNTER — COMMUNITY OUTREACH (OUTPATIENT)
Dept: FAMILY MEDICINE CLINIC | Age: 67
End: 2022-12-07

## 2022-12-07 ENCOUNTER — HOSPITAL ENCOUNTER (OUTPATIENT)
Dept: PHYSICAL THERAPY | Age: 67
Setting detail: THERAPIES SERIES
Discharge: HOME | End: 2022-12-07

## 2022-12-07 NOTE — FLOWSHEET NOTE
[address]    Physical Therapy  Cancellation/No-show Note  Patient Name:  Yuly Schreiber  :  1955   Date:  2022    Cancelled visits to date: 1  No-shows to date: 0    For today's appointment patient:  [x]  Cancelled  []  Rescheduled appointment  []  No-show     Reason given by patient:  [x]  Patient ill  []  Conflicting appointment  []  No transportation    []  Conflict with work  []  No reason given  []  Other:     Comments:      Phone call information:   []  Phone call made today to patient. []  Patient answered, conversation as follows:    []  Patient did not answer. [x]  Phone call not needed - pt contacted us to cancel and provided reason for cancellation.      Electronically signed by:  Rosy Lantigua PT,

## 2022-12-09 ENCOUNTER — HOSPITAL ENCOUNTER (OUTPATIENT)
Dept: PHYSICAL THERAPY | Age: 67
Setting detail: THERAPIES SERIES
End: 2022-12-09
Payer: MEDICARE

## 2022-12-14 ENCOUNTER — OFFICE VISIT (OUTPATIENT)
Dept: ORTHOPEDIC SURGERY | Age: 67
End: 2022-12-14
Payer: MEDICARE

## 2022-12-14 VITALS — HEIGHT: 68 IN | WEIGHT: 195 LBS | BODY MASS INDEX: 29.55 KG/M2

## 2022-12-14 DIAGNOSIS — M22.42 CHONDROMALACIA OF LEFT PATELLA: ICD-10-CM

## 2022-12-14 DIAGNOSIS — M54.16 LUMBAR RADICULITIS: ICD-10-CM

## 2022-12-14 DIAGNOSIS — M51.36 DDD (DEGENERATIVE DISC DISEASE), LUMBAR: ICD-10-CM

## 2022-12-14 DIAGNOSIS — M17.12 PRIMARY OSTEOARTHRITIS OF LEFT KNEE: ICD-10-CM

## 2022-12-14 DIAGNOSIS — M43.16 SPONDYLOLISTHESIS OF LUMBAR REGION: Primary | ICD-10-CM

## 2022-12-14 DIAGNOSIS — M54.50 LUMBAR SPINE PAIN: ICD-10-CM

## 2022-12-14 DIAGNOSIS — M25.562 LEFT KNEE PAIN, UNSPECIFIED CHRONICITY: ICD-10-CM

## 2022-12-14 PROBLEM — M51.369 DDD (DEGENERATIVE DISC DISEASE), LUMBAR: Status: ACTIVE | Noted: 2022-12-14

## 2022-12-14 PROCEDURE — 1090F PRES/ABSN URINE INCON ASSESS: CPT | Performed by: FAMILY MEDICINE

## 2022-12-14 PROCEDURE — 1036F TOBACCO NON-USER: CPT | Performed by: FAMILY MEDICINE

## 2022-12-14 PROCEDURE — 99214 OFFICE O/P EST MOD 30 MIN: CPT | Performed by: FAMILY MEDICINE

## 2022-12-14 PROCEDURE — 3017F COLORECTAL CA SCREEN DOC REV: CPT | Performed by: FAMILY MEDICINE

## 2022-12-14 PROCEDURE — 1123F ACP DISCUSS/DSCN MKR DOCD: CPT | Performed by: FAMILY MEDICINE

## 2022-12-14 PROCEDURE — G8417 CALC BMI ABV UP PARAM F/U: HCPCS | Performed by: FAMILY MEDICINE

## 2022-12-14 PROCEDURE — G8484 FLU IMMUNIZE NO ADMIN: HCPCS | Performed by: FAMILY MEDICINE

## 2022-12-14 PROCEDURE — G8400 PT W/DXA NO RESULTS DOC: HCPCS | Performed by: FAMILY MEDICINE

## 2022-12-14 PROCEDURE — G8428 CUR MEDS NOT DOCUMENT: HCPCS | Performed by: FAMILY MEDICINE

## 2022-12-14 RX ORDER — IBUPROFEN 800 MG/1
800 TABLET ORAL EVERY 8 HOURS PRN
Qty: 90 TABLET | Refills: 3 | Status: SHIPPED | OUTPATIENT
Start: 2022-12-14 | End: 2023-01-13

## 2022-12-14 RX ORDER — PREDNISONE 10 MG/1
TABLET ORAL
Qty: 12 TABLET | Refills: 0 | Status: SHIPPED | OUTPATIENT
Start: 2022-12-14

## 2022-12-14 RX ORDER — GABAPENTIN 100 MG/1
CAPSULE ORAL
Qty: 180 CAPSULE | Refills: 1 | Status: SHIPPED | OUTPATIENT
Start: 2022-12-14 | End: 2023-02-14

## 2022-12-14 NOTE — PATIENT INSTRUCTIONS
Stop meloxicam.    Take children's prednisone taper first for 6 days. This is a steroid. Follow the directions on the bottle. Please do not take any other anti-inflammatories with the children's prednisone taper as this can upset your stomach. If something else is needed, you may take extra strength tylenol. Once you are finished with the children's prednisone, then you may start your anti-inflammatory:  ibuprofen 3x/day    *Gabapentin may be taken with either of the other medicines.

## 2022-12-14 NOTE — PROGRESS NOTES
Chief Complaint  Follow-up (CK L KNEE)    Initial consultation persistent symptomatic left and right knee pain with difficulty walking distances and pivoting with occasional left knee pseudo buckling    Reevaluation ongoing mechanical low back pain with recurrent left leg pain and history of chronic right suspected L5 radiculopathy    History of Present Illness:  Reuben Gamble is a 69 y.o. female who is a very pleasant white female type II diabetic with last A1c 7.1 is a very nice patient of Dulce Cortez CNP who is being seen today for evaluation of progressive pain mildly to the lower back with achy symptoms and limited ability to walk distances in her legs bilaterally. She states that she has been having episodic achiness to her legs with subjective weakness for about 3 years but states that over the past years become more pronounced that if she walks more than 100 yards she will have to stop secondary to some achiness to her legs. If she rests more than a minute or 2 it does improve to the point where she is able to resume her activities. There is no history of injury or trauma prior to becoming symptomatic although her mother did require a laminectomy at L4-5 secondary to radiculopathy. On questioning her last year she did have an episode of radiculitis which seem to be more right-sided in the L4 distribution but improved with relative rest.  She does not utilize any type of bracing and does deny neurogenic bowel or bladder symptoms. She does have some discomfort with prolonged sitting as well and has had occasional nighttime discomfort. She was seen for foot and ankle symptoms on the right last year which responded fairly well to rehabilitation but she had been having some posterior thigh and calf pain at that point as well. She has not had recent imaging. She seems to do better with Advil as opposed to the diclofenac. She does deny neurogenic bowel or bladder symptoms.   Occasional nighttime discomfort noted. She is being seen today for orthopedic and sports consultation with imaging of her spine. We initially saw Loretta Orellana for her back on 5/3/2021 just prior to her leaving for vacation to [place]. We had recommended that she get an updated MRI of her lumbar spine given her degrees of injury but she did temporarily improve with her Medrol pack and had quite a bit of physical therapy 19 sessions last year with some improvement of her symptoms. She has noticed since roughly February 2022 she is having recurrence of pain and was having pain radiating into the lower extremities but not near those degree that they were last year as she has been good about keeping up with her exercises. When she called to make this appointment a couple of weeks ago she was having considerable mechanical back pain worse on the left than right. She is known to have underlying lumbar spondylolisthesis and probably does have some chronic episodic radicular findings but has not yet had MRI of her lumbar spine. She did try to start back on her anti-inflammatories but seems to do better with ibuprofen and has been using her warm-and-form brace. She does deny neurogenic bowel or bladder symptoms and is having more axial pain left than right as opposed to high-grade radicular symptoms currently. We last saw Loretta Orellana in the office on 5/11/2022 when all of her treatment at that point had to do with her mechanical back pain and did have a significant listhesis at L4-5 on her plain films and was complaining of symptoms most suggestive of spinal stenosis and possibly a low-grade right L5 radiculopathy. She did improve with conservative treatment but we had discussed MRI imaging and had gotten approval for an MRI scan with the plans of having her see [facility] for additional treatment recommendations.   She did improve following extensive therapy at 19 sessions and apparently did have other medical issues going on for she never really did have her MRI or follow-up with [facility], [place]. She was doing well until they drove 16 hours to [place] early in the summer and since that time she has been having some gluteal discomfort more so on the right with some extension down the right leg in the L5 distribution without substantial groin pain. She still has her spinal stenosis symptoms and is continued with her ibuprofen 3 times daily and does occasionally utilize her brace and has been only fairly consistent with her lumbar exercise and stretching program.  Her greater complaint today is of discomfort to her left knee which is been an ongoing problem over the last several years. She does have an obvious varus deformity to the left knee and does have really only mild pain at 1-2 out of 10 with positional changes or if she walks distances or climb stairs and does have crepitation and popping. She has had some occasional pseudo buckling but no definitive locking or catching and has never really had dedicated treatment or bracing to the left knee. She is being seen today for evaluation of her back as well as right lateral hip and leg discomfort and new films of her left knee. We last saw Savanah Thomas in the office on 9/26/2022 and was continued on treatment for her underlying very substantial left knee medial compartment osteoarthritis with bone-on-bone changes and patellofemoral arthropathy. Complete viscosupplementation with Euflexxa to her knee on 9/26/2022 but is also been battling worsening of her chronic mechanical back pain with suspected left L5 radicular symptoms. She got an approval for her MRI scan but was unable to complete this and did recently see Vicente Hunter as well who recommended physical therapy for gait training and rehabilitation and she has started this but admits she needs to be better with her home-based exercises and continues to have nocturnal pain into the right leg in the L5 distribution.   She did try changing her to meloxicam but she was not able to tolerate this and seems to do better with ibuprofen. She is take about 600 mg 2-3 times daily and continues to have achiness into her leg particular with prolonged standing and walking. She does believe her viscosupplementation helped her left knee symptoms at least 50% and she would like to put off knee arthroplasty as long as she can. Denies locking catching or true instability symptoms and occasionally does utilize her brace. Pain Assessment  Location of Pain: Knee  Location Modifiers: Left  Severity of Pain: 3  Quality of Pain: Sharp, Throbbing  Duration of Pain: Persistent  Frequency of Pain: Constant  Aggravating Factors: Bending, Stretching, Straightening, Walking, Stairs  Limiting Behavior: Yes  Result of Injury: Yes  Work-Related Injury: No  Are there other pain locations you wish to document?: No       Medical History  Patient's medications, allergies, past medical, surgical, social and family histories were reviewed and updated as appropriate. Review of Systems  Pertinent items are noted in HPI  Review of systems reviewed from Patient History Form dated on 5/3/2021 and available in the patient's chart under the Media tab. Vital Signs  There were no vitals filed for this visit. General Exam:     Constitutional: Patient is adequately groomed with no evidence of malnutrition  DTRs: Deep tendon reflexes are intact  Mental Status: The patient is oriented to time, place and person. The patient's mood and affect are appropriate. Lymphatic: The lymphatic examination bilaterally reveals all areas to be without enlargement or induration. Vascular: Examination reveals no swelling or calf tenderness. Peripheral pulses are palpable and 2+. Neurological: The patient has good coordination. There is no weakness or sensory deficit. Lumbar/Sacral Spine Examination  Inspection: There is no high-grade disc deformities or substantial scoliosis. Mild kyphosis noted.   No palpable swelling or spasm. Palpation: She does have residual clinical tenderness over the right greater than left lumbar paraspinals and mild tenderness along lower lumbar facets. She does have some very mild gluteal tenderness right greater than left. Rang of Motion: She can forward flex to about 50. She does have only mildly painful extension which produces pain to the posterior thighs right slightly worse than left proximally and also discomfort into her right lateral hip and upper thigh. .      Strength: There is not appear to be focal lower extremity motor deficits. Special Tests: Pain with lumbar extension as noted above. Her straight leg raising does not appear to be overwhelmingly positive today. Screening hip testing relatively benign. Skin: There are no rashes, ulcerations or lesions. Distal motor sensory and vascular exams intact. Gait: Mild antalgia. Reflexes:  Symmetrically preserved but 3 out of 4 at the knees 4-4 at the ankles. Additional Comments: Evaluation of her left knee does reveal obvious varus deformity. She does have a trace knee joint effusion and tenderness over the medial and lateral patellofemoral facet diffuse medial joint line tenderness noted. She is stiff in the terminal 10 degrees of extension with flexion limited to about 110 today. Hamstrings are still somewhat tight. Strength 4-5 with flexion and extension. Mild pain with patellar grind testing and some discomfort with Abdelrahman's remarkable for crepitation but no high-grade clicks and no obvious instability noted. Additional Examinations:  Right Lower Extremity: Examination of the right lower extremity does not show any tenderness, deformity or injury. Range of motion is unremarkable. There is no gross instability. There are no rashes, ulcerations or lesions.   Strength and tone are normal.  Left Lower Extremity: Examination of the left lower extremity does not show any tenderness, deformity or injury. Range of motion is unremarkable. There is no gross instability. There are no rashes, ulcerations or lesions. Strength and tone are normal.      Diagnostic Test Findings: AP and lateral lumbar spine films were reviewed from 5/3/2021 and does show multilevel lumbar degenerative disc changes with endplate spurring proximally with a grade 1 approaching grade 2 spondylolisthesis of L4-5. Left knee AP and PA weightbearing sunrise and lateral films were 3/31/2022 and does show advanced bone-on-bone changes to the medial compartment of her left knee with subchondral sclerosis with significant patellofemoral bone spurring and tilt. Patient unable to complete lumbar MRI secondary to claustrophobia earlier this year    Assessment: #1. Chronic episodically symptomatic mechanical low back pain with lumbar degenerative disc disease and grade 1-2 L4-5 lumbar spondylolisthesis with likely spinal stenosis and history of right greater than left L5 radiculitis  #2. Chronic moderately improved symptomatic left knee advanced medial compartment osteoarthritis of bone-on-bone changes medial compartment with patellofemoral arthropathy and improvements in difficulty walking distances and climbing stairs    Impression:  Encounter Diagnoses   Name Primary? Spondylolisthesis of lumbar region Yes    DDD (degenerative disc disease), lumbar     Lumbar radiculitis     Lumbar spine pain     Primary osteoarthritis of left knee     Chondromalacia of left patella     Left knee pain, unspecified chronicity        Office Procedures:  Orders Placed This Encounter   Procedures    Ambulatory referral to Physical Therapy     Referral Priority:   Routine     Referral Type:   Eval and Treat     Referral Reason:   Specialty Services Required     Requested Specialty:   Physical Therapist     Number of Visits Requested:   1       Treatment Plan:  Treatment options were discussed with Marsha Reynoso.   We did once again review of updated plain films and exam findings. She does have a significant listhesis at L4-5 and is complaining primarily of symptoms most suggestive of spinal stenosis of limited ability to walk distances and inclines and with initial treatment she did have a transient improvement but following a 16-hour car drive to [place] for vacation she is now having worsening pain suggestive of persistent chronic right L5 radiculopathy. I encouraged her to get back into physical therapy for reemphasis of her home-based exercise program for spine and will continue with her warm-and-form belt. She unfortunate was unable to get her lumbar MRI scan secondary to claustrophobia earlier this year. Her symptoms do strongly suggest ongoing chronic right L5 radiculopathy. She is going to be leaving [facility] to [facility] on 12/26/2022 and as such we did place her on a children's dose prednisone taper as she had GI upset with routine Medrol and we will change her back to ibuprofen 800 mg 3 times daily. With her nocturnal symptoms we will see how she does with a short course of Neurontin 100 mg escalating to 3 times daily. Strongly encouraged her to continue with her knee brace and her home-based exercise program and is aware that she would be eligible for repeat viscosupplementation after 3/29/2023. We will see her back specifically to follow-up her back and right leg symptoms in 6 weeks. She will contact us in the interim with questions or concerns. This dictation was performed with a verbal recognition program (DRAGON) and it was checked for errors. It is possible that there are still dictated errors within this office note. If so, please bring any errors to my attention for an addendum. All efforts were made to ensure that this office note is accurate.

## 2022-12-16 ENCOUNTER — HOSPITAL ENCOUNTER (OUTPATIENT)
Dept: PHYSICAL THERAPY | Age: 67
Setting detail: THERAPIES SERIES
Discharge: HOME | End: 2022-12-16
Payer: MEDICARE

## 2022-12-16 PROCEDURE — 97140 MANUAL THERAPY 1/> REGIONS: CPT

## 2022-12-16 PROCEDURE — 97110 THERAPEUTIC EXERCISES: CPT

## 2022-12-16 NOTE — FLOWSHEET NOTE
[facility], [address] [facility]  Phone: [phone]  Fax [phone]    Physical Therapy Daily Treatment Note  Date:  2022    Patient Name:  Viktoria Flores    :  1955  MRN: [id number]  Restrictions/Precautions:    Physician Information:   Dr. Leonard Rueda  Medical/Treatment Diagnosis Information:  Diagnosis: M17.12 Primary osteoarthritis of left knee;  Treatment Diagnosis: M25.562 Left knee pain; M54.50 Lumbar spine pain   [x] Conservative / [] Surgical - DOS:  Therapy Diagnosis/Practice Pattern:  Practice Pattern A: Primary Prevention  Insurance/Certification information:  PT Insurance Information: /[facility]  Plan of care signed: [] YES  [] NO  Number of Comorbidities:  []0     []1-2    [x]3+  Date of Patient follow up with Physician:     Is this a Progress Report:     []  Yes  [x]  No        If Yes:  Date Range for reporting period:  Beginning 2022  Ending 2022    Progress report will be due (10 Rx or 30 days whichever is less):        Recertification will be due (POC Duration  / 90 days whichever is less):       Latex Allergy:  [x]NO      []YES  Preferred Language for Healthcare:   [x]English       []other:    Visit # Insurance Allowable   10 BMN  auth []no        []yes:     SUBJECTIVE:  Pt reports continued symptoms. Still no MRI.  Following up with MD and working on management with meds      OBJECTIVE:   Observation:  Palpation:    Test used Initial score Current Score   VAS     LEFS     flexion     extension     quad     ABD     flexion          RESTRICTIONS/PRECAUTIONS:     Exercises/Interventions:     Therapeutic Ex/NMR  Sets/reps Notes   HS floss 15 x 5\"    Piriformis stretch 3 x 20\"    LTR x20    3 way clam  HL march  Bridge with band 20 x  20 x  2 x 10 GTB  GTB  RTB   Supine Hip Adduction hold    SLR 2 x 10 ea     [place] 15 x Heel raise 3x10   Standing hamstring curl 2x12 Manual Intervention     PROM, gentle STM to R hip flexor, bilateral glutes/piriformis 15 min                     Access Code: Georgina Fletcher  URL: [email or web address]  Date: 09/07/2022  Prepared by: Kristin Henderson    Exercises  Hamstring floss - 1-2 x daily - 7 x weekly - 10 reps  Supine Piriformis Stretch with Foot on Ground - 1-2 x daily - 7 x weekly - 3 reps - 30 hold  Hooklying Clamshell with Resistance - 1-2 x daily - 7 x weekly - 2-3 sets - 10 reps  Hooklying Isometric Clamshell - 1-2 x daily - 7 x weekly - 2-3 sets - 10 reps  Supine Bridge with Resistance Band - 1-2 x daily - 7 x weekly - 2-3 sets - 10 reps  Seated Knee Extension with Resistance - 1-2 x daily - 7 x weekly - 2-3 sets - 10 reps      Therapeutic Exercise and NMR EXR  [x] (47171) Provided verbal/tactile cueing for activities related to strengthening, flexibility, endurance, ROM for improvements in LE, proximal hip, and core control with self care, mobility, lifting, ambulation.  [] (66707) Provided verbal/tactile cueing for activities related to improving balance, coordination, kinesthetic sense, posture, motor skill, proprioception  to assist with LE, proximal hip, and core control in self care, mobility, lifting, ambulation and eccentric single leg control.      NMR and Therapeutic Activities:    [x] (87371 or 86626) Provided verbal/tactile cueing for activities related to improving balance, coordination, kinesthetic sense, posture, motor skill, proprioception and motor activation to allow for proper function of core, proximal hip and LE with self care and ADLs  [] (82766) Gait Re-education- Provided training and instruction to the patient for proper LE, core and proximal hip recruitment and positioning and eccentric body weight control with ambulation re-education including up and down stairs     Home Exercise Program:    [x] (71677) Reviewed/Progressed HEP activities related to strengthening, flexibility, endurance, ROM of core, proximal hip and LE for functional self-care, mobility, lifting and ambulation/stair navigation   [] (32437)Reviewed/Progressed HEP activities related to improving balance, coordination, kinesthetic sense, posture, motor skill, proprioception of core, proximal hip and LE for self care, mobility, lifting, and ambulation/stair navigation      Manual Treatments:  PROM / STM / Oscillations-Mobs:  G-I, II, III, IV (PA's, Inf., Post.)  [x] (16838) Provided manual therapy to mobilize LE, proximal hip and/or LS spine soft tissue/joints for the purpose of modulating pain, promoting relaxation,  increasing ROM, reducing/eliminating soft tissue swelling/inflammation/restriction, improving soft tissue extensibility and allowing for proper ROM for normal function with self care, mobility, lifting and ambulation. Modalities:       [] GR/ESU 15 min    [] GR 15 min  [] ESU     [] CP    [] MHP    [] declined     Charges:  Timed Code Treatment Minutes: 40   Total Treatment Minutes: 40      EVAL (LOW) 74287 (typically 20 minutes face-to-face)  [] EVAL (MOD) 49462 (typically 30 minutes face-to-face)  [] EVAL (HIGH) 05797 (typically 45 minutes face-to-face)  [] RE-EVAL     [x] CY(42174) x 2   [] IONTO  [] NMR (15706) x     [] VASO  [x] Manual (17529) x 1      [] Other:  [] TA x      [] Mech Traction (57962)  [] ES(attended) (95396)      [] ES (un) (04868):     GOALS: Patient stated goal: Reduce pain in back and knee  [x] Progressing: [] Met: [] Not Met: [] Adjusted    Therapist goals for Patient:   Short Term Goals: To be achieved in: 2 weeks  1. Independent in HEP and progression per patient tolerance, in order to prevent re-injury. [] Progressing: [x] Met: [] Not Met: [] Adjusted  2. Patient will have a decrease in pain to facilitate improvement in movement, function, and ADLs as indicated by Functional Deficits. [] Progressing: [x] Met: [] Not Met: [] Adjusted    Long Term Goals: To be achieved in: 12 weeks  1.  Disability index score of 66 or greater on FOTO outcome measure to assist with reaching prior level of function. [x] Progressing: [] Met: [] Not Met: [] Adjusted  2. Patient will demonstrate increased AROM to 0-130 pain free to allow for proper joint functioning as indicated by patients Functional Deficits. [x] Progressing: [] Met: [] Not Met: [] Adjusted  3. Patient will demonstrate an increase in Strength to good proximal hip strength and control, at least 4+/5 throughout LE to allow for proper functional mobility as indicated by patients Functional Deficits. [] Progressing: [] Met: [] Not Met: [] Adjusted  4. 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 [x] Progressing: [] Met: [] Not Met: [] Adjusted  5. Patient will be able to ascend/descend stairs with reciprocal pattern for return to normal ADLs   [x] Progressing: [] Met: [] Not Met: [] Adjusted      Overall Progression Towards Functional goals/ Treatment Progress Update:  [x] Patient is progressing as expected towards functional goals listed. [] Progression is slowed due to complexities/Impairments listed. [] Progression has been slowed due to co-morbidities. [] Plan just implemented, too soon to assess goals progression <30days   [] Goals require adjustment due to lack of progress  [] Patient is not progressing as expected and requires additional follow up with physician  [] Other    Prognosis for POC: [x] Good [] Fair  [] Poor      Patient requires continued skilled intervention: [x] Yes  [] No      ASSESSMENT:  Patient continues to have tightness at R hip flexor and glut. She was able to complete the straight leg raise with minimal pain, decreased pain compared to last session. Patient was able to tolerate all exercises well today compared to previous session. Plan to progress LE strengthening.      Treatment/Activity Tolerance:  [x] Patient tolerated treatment well [] Patient limited by fatigue  [x] Patient limited by pain  [] Patient limited by other medical complications  [] Other:       PLAN: See eval  [] Continue per plan of care [] Alter current plan (see comments above)  [x] Plan of care initiated [] Hold pending MD visit [] Discharge      Electronically signed by:  Khushi Che, PT , DPT [id number]. Alexandre Barrow, SPT     Note: If patient does not return for scheduled/ recommended follow up visits, this note will serve as a discharge from care along with most recent update on progress.

## 2023-03-14 LAB
AVERAGE GLUCOSE: 171
HBA1C MFR BLD: 7.6 %

## 2023-03-27 ENCOUNTER — OFFICE VISIT (OUTPATIENT)
Dept: ORTHOPEDIC SURGERY | Age: 68
End: 2023-03-27
Payer: MEDICARE

## 2023-03-27 VITALS — BODY MASS INDEX: 29.55 KG/M2 | HEIGHT: 68 IN | WEIGHT: 195 LBS

## 2023-03-27 DIAGNOSIS — M22.42 CHONDROMALACIA OF LEFT PATELLA: ICD-10-CM

## 2023-03-27 DIAGNOSIS — M25.562 LEFT KNEE PAIN, UNSPECIFIED CHRONICITY: ICD-10-CM

## 2023-03-27 DIAGNOSIS — M17.12 PRIMARY OSTEOARTHRITIS OF LEFT KNEE: Primary | ICD-10-CM

## 2023-03-27 PROCEDURE — 1036F TOBACCO NON-USER: CPT | Performed by: FAMILY MEDICINE

## 2023-03-27 PROCEDURE — 99214 OFFICE O/P EST MOD 30 MIN: CPT | Performed by: FAMILY MEDICINE

## 2023-03-27 PROCEDURE — G8484 FLU IMMUNIZE NO ADMIN: HCPCS | Performed by: FAMILY MEDICINE

## 2023-03-27 PROCEDURE — 1123F ACP DISCUSS/DSCN MKR DOCD: CPT | Performed by: FAMILY MEDICINE

## 2023-03-27 PROCEDURE — G8400 PT W/DXA NO RESULTS DOC: HCPCS | Performed by: FAMILY MEDICINE

## 2023-03-27 PROCEDURE — 20610 DRAIN/INJ JOINT/BURSA W/O US: CPT | Performed by: FAMILY MEDICINE

## 2023-03-27 PROCEDURE — 3017F COLORECTAL CA SCREEN DOC REV: CPT | Performed by: FAMILY MEDICINE

## 2023-03-27 PROCEDURE — G8428 CUR MEDS NOT DOCUMENT: HCPCS | Performed by: FAMILY MEDICINE

## 2023-03-27 PROCEDURE — 1090F PRES/ABSN URINE INCON ASSESS: CPT | Performed by: FAMILY MEDICINE

## 2023-03-27 PROCEDURE — G8417 CALC BMI ABV UP PARAM F/U: HCPCS | Performed by: FAMILY MEDICINE

## 2023-03-27 RX ORDER — LIDOCAINE HYDROCHLORIDE 10 MG/ML
1 INJECTION, SOLUTION INFILTRATION; PERINEURAL ONCE
Status: COMPLETED | OUTPATIENT
Start: 2023-03-27 | End: 2023-03-27

## 2023-03-27 RX ORDER — BETAMETHASONE SODIUM PHOSPHATE AND BETAMETHASONE ACETATE 3; 3 MG/ML; MG/ML
12 INJECTION, SUSPENSION INTRA-ARTICULAR; INTRALESIONAL; INTRAMUSCULAR; SOFT TISSUE ONCE
Status: COMPLETED | OUTPATIENT
Start: 2023-03-27 | End: 2023-03-27

## 2023-03-27 RX ORDER — BUPIVACAINE HYDROCHLORIDE 2.5 MG/ML
2 INJECTION, SOLUTION INFILTRATION; PERINEURAL ONCE
Status: COMPLETED | OUTPATIENT
Start: 2023-03-27 | End: 2023-03-27

## 2023-03-27 RX ADMIN — LIDOCAINE HYDROCHLORIDE 1 ML: 10 INJECTION, SOLUTION INFILTRATION; PERINEURAL at 13:27

## 2023-03-27 RX ADMIN — BETAMETHASONE SODIUM PHOSPHATE AND BETAMETHASONE ACETATE 12 MG: 3; 3 INJECTION, SUSPENSION INTRA-ARTICULAR; INTRALESIONAL; INTRAMUSCULAR; SOFT TISSUE at 13:25

## 2023-03-27 RX ADMIN — BUPIVACAINE HYDROCHLORIDE 5 MG: 2.5 INJECTION, SOLUTION INFILTRATION; PERINEURAL at 13:26

## 2023-03-27 NOTE — PROGRESS NOTES
left knee advanced medial compartment osteoarthritis of bone-on-bone changes medial compartment with patellofemoral arthropathy with recurrent difficulty walking distances and climbing stairs with synovitis status post completion of left knee viscosupplementation 9/26/2022    Impression:  No diagnosis found. Office Procedures:  No orders of the defined types were placed in this encounter. Treatment Plan:  Treatment options were discussed with Emmie Reynoso. We did once again review of updated plain films and exam findings. She does have a significant listhesis at L4-5 and is complaining primarily of symptoms most suggestive of spinal stenosis of limited ability to walk distances and inclines and with initial treatment she did have a transient improvement but following a 16-hour car drive to [place] for vacation she is now having worsening pain suggestive of persistent chronic right L5 radiculopathy. I encouraged her to get back into physical therapy for reemphasis of her home-based exercise program for spine and will continue with her warm-and-form belt. She unfortunate was unable to get her lumbar MRI scan secondary to claustrophobia earlier this year. Her symptoms do strongly suggest ongoing chronic right L5 radiculopathy. She has been thinking about potentially trying her to do her MRI once again now that her back is not been flared and we will see her at over the next week or 2. Her left knee has become recurrently symptomatic recently to the point where he opted to is to do a steroid injection to her left knee using 2 cc of Celestone, 2 cc of Marcaine, 1 cc Xylocaine. She will monitor for transient hyperglycemia. She will continue with her ibuprofen increasing to 800 mg 3 times daily we will continue to work on exercise program for her back leg and knee.   Potential for repeating viscosupplementation was discussed that she is going to be leaving in about a month to visit family in

## 2023-03-30 DIAGNOSIS — I10 ESSENTIAL HYPERTENSION: ICD-10-CM

## 2023-03-30 RX ORDER — LISINOPRIL 40 MG/1
40 TABLET ORAL DAILY
Qty: 90 TABLET | Refills: 0 | Status: SHIPPED | OUTPATIENT
Start: 2023-03-30

## 2023-04-03 ENCOUNTER — OFFICE VISIT (OUTPATIENT)
Dept: ORTHOPEDIC SURGERY | Age: 68
End: 2023-04-03

## 2023-04-03 DIAGNOSIS — M22.42 CHONDROMALACIA OF LEFT PATELLA: ICD-10-CM

## 2023-04-03 DIAGNOSIS — M25.562 LEFT KNEE PAIN, UNSPECIFIED CHRONICITY: ICD-10-CM

## 2023-04-03 DIAGNOSIS — M17.12 PRIMARY OSTEOARTHRITIS OF LEFT KNEE: Primary | ICD-10-CM

## 2023-04-03 RX ORDER — HYALURONATE SODIUM 10 MG/ML
20 SYRINGE (ML) INTRAARTICULAR ONCE
Status: COMPLETED | OUTPATIENT
Start: 2023-04-03 | End: 2023-04-03

## 2023-04-03 RX ADMIN — Medication 20 MG: at 13:09

## 2023-04-03 NOTE — PROGRESS NOTES
ulcerations or lesions. Strength and tone are normal.      Diagnostic Test Findings: AP and lateral lumbar spine films were reviewed from 5/3/2021 and does show multilevel lumbar degenerative disc changes with endplate spurring proximally with a grade 1 approaching grade 2 spondylolisthesis of L4-5. Left knee AP and PA weightbearing sunrise and lateral films were 3/31/2022 and does show advanced bone-on-bone changes to the medial compartment of her left knee with subchondral sclerosis with significant patellofemoral bone spurring and tilt. Patient unable to complete lumbar MRI secondary to claustrophobia earlier this year    Assessment: #1. Chronic episodically symptomatic mechanical low back pain with lumbar degenerative disc disease and grade 1-2 L4-5 lumbar spondylolisthesis with likely spinal stenosis and history of right greater than left L5 radiculitis established with Candelaria Velásquez  #2. Recurrent symptomatic left knee advanced medial compartment osteoarthritis of bone-on-bone changes medial compartment with patellofemoral arthropathy with recurrent difficulty walking distances and climbing stairs with improved recurrent synovitis with left knee Euflexxa No. 1     Impression:  Encounter Diagnoses   Name Primary? Primary osteoarthritis of left knee Yes    Chondromalacia of left patella     Left knee pain, unspecified chronicity          Office Procedures:  Orders Placed This Encounter   Procedures    20610 - MT DRAIN/INJECT LARGE JOINT/BURSA         Treatment Plan:  Treatment options were discussed with Francisco Reynoso. We did once again review of updated plain films and exam findings.   She does have a significant listhesis at L4-5 and is complaining primarily of symptoms most suggestive of spinal stenosis of limited ability to walk distances and inclines and with initial treatment she did have a transient improvement but following a 16-hour car drive to [place] for vacation she is now having

## 2023-04-10 PROBLEM — M75.121 NONTRAUMATIC COMPLETE TEAR OF RIGHT ROTATOR CUFF: Status: ACTIVE | Noted: 2023-04-10

## 2023-04-10 PROBLEM — M25.511 RIGHT SHOULDER PAIN: Status: ACTIVE | Noted: 2023-04-10

## 2023-04-17 ENCOUNTER — OFFICE VISIT (OUTPATIENT)
Dept: ORTHOPEDIC SURGERY | Age: 68
End: 2023-04-17
Payer: MEDICARE

## 2023-04-17 DIAGNOSIS — M25.511 RIGHT SHOULDER PAIN, UNSPECIFIED CHRONICITY: ICD-10-CM

## 2023-04-17 DIAGNOSIS — M43.16 SPONDYLOLISTHESIS OF LUMBAR REGION: ICD-10-CM

## 2023-04-17 DIAGNOSIS — M51.36 DDD (DEGENERATIVE DISC DISEASE), LUMBAR: ICD-10-CM

## 2023-04-17 DIAGNOSIS — M25.562 LEFT KNEE PAIN, UNSPECIFIED CHRONICITY: ICD-10-CM

## 2023-04-17 DIAGNOSIS — M22.42 CHONDROMALACIA OF LEFT PATELLA: ICD-10-CM

## 2023-04-17 DIAGNOSIS — M54.16 LUMBAR RADICULITIS: ICD-10-CM

## 2023-04-17 DIAGNOSIS — M75.121 NONTRAUMATIC COMPLETE TEAR OF RIGHT ROTATOR CUFF: ICD-10-CM

## 2023-04-17 DIAGNOSIS — M17.12 PRIMARY OSTEOARTHRITIS OF LEFT KNEE: Primary | ICD-10-CM

## 2023-04-17 DIAGNOSIS — M54.50 LUMBAR SPINE PAIN: ICD-10-CM

## 2023-04-17 PROCEDURE — 1123F ACP DISCUSS/DSCN MKR DOCD: CPT | Performed by: FAMILY MEDICINE

## 2023-04-17 PROCEDURE — G8428 CUR MEDS NOT DOCUMENT: HCPCS | Performed by: FAMILY MEDICINE

## 2023-04-17 PROCEDURE — 20610 DRAIN/INJ JOINT/BURSA W/O US: CPT | Performed by: FAMILY MEDICINE

## 2023-04-17 PROCEDURE — 3017F COLORECTAL CA SCREEN DOC REV: CPT | Performed by: FAMILY MEDICINE

## 2023-04-17 PROCEDURE — G8417 CALC BMI ABV UP PARAM F/U: HCPCS | Performed by: FAMILY MEDICINE

## 2023-04-17 PROCEDURE — 1090F PRES/ABSN URINE INCON ASSESS: CPT | Performed by: FAMILY MEDICINE

## 2023-04-17 PROCEDURE — 1036F TOBACCO NON-USER: CPT | Performed by: FAMILY MEDICINE

## 2023-04-17 PROCEDURE — G8400 PT W/DXA NO RESULTS DOC: HCPCS | Performed by: FAMILY MEDICINE

## 2023-04-17 PROCEDURE — 99214 OFFICE O/P EST MOD 30 MIN: CPT | Performed by: FAMILY MEDICINE

## 2023-04-17 RX ORDER — HYALURONATE SODIUM 10 MG/ML
20 SYRINGE (ML) INTRAARTICULAR ONCE
Status: COMPLETED | OUTPATIENT
Start: 2023-04-17 | End: 2023-04-17

## 2023-04-17 RX ORDER — METHYLPREDNISOLONE 4 MG/1
TABLET ORAL
Qty: 21 KIT | Refills: 0 | Status: SHIPPED | OUTPATIENT
Start: 2023-04-17

## 2023-04-17 RX ADMIN — Medication 20 MG: at 13:10

## 2023-04-17 NOTE — PROGRESS NOTES
instructions for the use of and application of this item were provided. The patient was educated and fit by a healthcare professional with expert knowledge and specialization in brace application. They were instructed to contact the office immediately should the equipment result in increased pain, decreased sensation, increased swelling or worsening of the condition. 66834 - PA DRAIN/INJECT LARGE JOINT/BURSA         Treatment Plan:  Treatment options were discussed with Isra Reynoso. We did once again review of updated plain films and exam findings. She does have a significant listhesis at L4-5 and is complaining primarily of symptoms most suggestive of spinal stenosis of limited ability to walk distances and inclines and with initial treatment she did have a transient improvement but following a 16-hour car drive to [place] for vacation she is now having worsening pain suggestive of persistent chronic right L5 radiculopathy. I encouraged her to get back into physical therapy for reemphasis of her home-based exercise program for spine and will continue with her warm-and-form belt. She unfortunate was unable to get her lumbar MRI scan secondary to claustrophobia earlier this year. Her symptoms do strongly suggest ongoing chronic right L5 radiculopathy. She has been thinking about potentially trying her to do her MRI once again now that her back is not been flared and we will see her at over the next week or 2. She still has having some nocturnal achiness we may need to consider updated imaging. She states that her back and leg symptoms have improved somewhat and was encouraged to follow-up with Lamin Lucero. Her knee is doing somewhat better and after discussion of pros and cons of viscosupplementation, she did receive her third injection of Euflexxa to her left knee. This was performed using a standard prefilled 2 cc syringe.   I would like for her to continue with her ibuprofen to 800 mg 3 times

## 2023-04-18 ENCOUNTER — APPOINTMENT (OUTPATIENT)
Dept: PHYSICAL THERAPY | Age: 68
End: 2023-04-18
Payer: MEDICARE

## 2023-04-19 ENCOUNTER — HOSPITAL ENCOUNTER (OUTPATIENT)
Dept: PHYSICAL THERAPY | Age: 68
Setting detail: THERAPIES SERIES
Discharge: HOME | End: 2023-04-19
Payer: MEDICARE

## 2023-04-19 PROCEDURE — 97110 THERAPEUTIC EXERCISES: CPT | Performed by: PHYSICAL THERAPIST

## 2023-04-19 PROCEDURE — 97140 MANUAL THERAPY 1/> REGIONS: CPT | Performed by: PHYSICAL THERAPIST

## 2023-04-19 NOTE — FLOWSHEET NOTE
ES(attended) (48570)      [] ES (un) (41783):     GOALS: \"To be pain free and gain mobility. \"  [] Progressing: [] Met: [] Not Met: [] Adjusted     Therapist goals for Patient:   Short Term Goals: To be achieved in: 2 weeks  1. Independent in HEP and progression per patient tolerance, in order to prevent re-injury. [x] Progressing: [] Met: [] Not Met: [] Adjusted  2. Patient will have a decrease in pain to facilitate improvement in movement, function, and ADLs as indicated by Functional Deficits. [] Progressing: [] Met: [] Not Met: [] Adjusted     Long Term Goals: To be achieved in: 12 weeks  1. Disability index score of 5% or less per Mirta Renner to assist with reaching prior level of function. [] Progressing: [] Met: [] Not Met: [] Adjusted  2. Patient will demonstrate increased AROM flexion to 165 or greater to allow for proper joint functioning to reach for items out of the cabinet. [] Progressing: [] Met: [] Not Met: [] Adjusted  3. Patient will demonstrate an increase in R shoulder strength to 4+/5-5/5 to allow for proper functional mobility to lift light to moderate items for daily activities. [] Progressing: [] Met: [] Not Met: [] Adjusted  4. 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 [] Progressing: [] Met: [] Not Met: [] Adjusted  5. Pt will demonstrate the ability to reach outside SHERRY for an item with improved scapular control to avoid sensation R arm dropping. [] Progressing: [] Met: [] Not Met: [] Adjusted             Progression Towards Functional goals:  [x] Patient is progressing as expected towards functional goals listed. [] Progression is slowed due to complexities listed. [] Progression has been slowed due to co-morbidities. [] Plan just implemented, too soon to assess goals progression  [] Other:     ASSESSMENT:  Reviewed HEP exercise questions and form regarding, pt demonstrated improved understanding.

## 2023-05-10 ENCOUNTER — HOSPITAL ENCOUNTER (OUTPATIENT)
Dept: PHYSICAL THERAPY | Age: 68
Setting detail: THERAPIES SERIES
Discharge: HOME | End: 2023-05-10
Payer: MEDICARE

## 2023-05-10 PROCEDURE — 97140 MANUAL THERAPY 1/> REGIONS: CPT | Performed by: PHYSICAL THERAPIST

## 2023-05-10 PROCEDURE — 97110 THERAPEUTIC EXERCISES: CPT | Performed by: PHYSICAL THERAPIST

## 2023-05-10 NOTE — FLOWSHEET NOTE
HEP   SB roll up 10x    Red medball Stabilization Cw/ccw 10x each    Shoulder Flexion/Scaption    Wall Walks HEP   Wall Abduction Taps below 90  10x    Medicine ball pass bilateral Blue 10x Pt education                                Manual Intervention (61194)      R PROM shoulder all directions  X 4 min    Gentle Gr I/II GH mobilizations  X 4 min    R Scapular Mobilizations  X 2 min                      NMR re-education (67288)   CUES NEEDED                                                         Therapeutic Activity (01736)                                            HEP instruction:   Access Code: [email or web address]  URL: [email or web address]  Date: 04/12/2023  Prepared by: Sheri Anna     Exercises  - Supine Single Arm Shoulder Protraction  - 1 x daily - 7 x weekly - 1-2 sets - 10 reps  - Sidelying Shoulder Abduction Palm Forward  - 1 x daily - 7 x weekly - 1-2 sets - 10 reps  - Shoulder Flexion Wall Walk  - 1 x daily - 7 x weekly - 1-2 sets - 10 reps  - Seated Scapular Retraction  - 1 x daily - 7 x weekly - 3 sets - 10 reps  - Standing Shoulder Row with Anchored Resistance  - 1 x daily - 7 x weekly - 1-2 sets - 10 reps  - Shoulder Extension with Resistance  - 1 x daily - 7 x weekly - 1-2 sets - 10 reps      Therapeutic Exercise and NMR EXR  [x] (82723) Provided verbal/tactile cueing for activities related to strengthening, flexibility, endurance, ROM  for improvements in scapular, scapulothoracic and UE control with self care, reaching, carrying, lifting, house/yardwork, driving/computer work.    [] (40176) Provided verbal/tactile cueing for activities related to improving balance, coordination, kinesthetic sense, posture, motor skill, proprioception  to assist with  scapular, scapulothoracic and UE control with self care, reaching, carrying, lifting, house/yardwork, driving/computer work.     Therapeutic Activities:    [] (91664 or [id number]) Provided verbal/tactile cueing for activities related to improving

## 2023-05-17 ENCOUNTER — HOSPITAL ENCOUNTER (OUTPATIENT)
Dept: PHYSICAL THERAPY | Age: 68
Setting detail: THERAPIES SERIES
Discharge: HOME | End: 2023-05-17
Payer: MEDICARE

## 2023-05-17 PROCEDURE — 97110 THERAPEUTIC EXERCISES: CPT | Performed by: PHYSICAL THERAPIST

## 2023-05-17 PROCEDURE — 97140 MANUAL THERAPY 1/> REGIONS: CPT | Performed by: PHYSICAL THERAPIST

## 2023-05-17 NOTE — FLOWSHEET NOTE
demonstrated increased fatigue with wall stabilization exercises, required verbal cues for form. Pt education to utilize ice at home following increased activity to reduce pain levels. Pt demonstrated pain at current end range shoulder flexion and ER. Pt is continuing to increase activity tolerance each session. Overall Progression Towards Functional goals/ Treatment Progress Update:  [x] Patient is progressing as expected towards functional goals listed. [] Progression is slowed due to complexities/Impairments listed. [] Progression has been slowed due to co-morbidities. [] Plan just implemented, too soon to assess goals progression <30days   [] Goals require adjustment due to lack of progress  [] Patient is not progressing as expected and requires additional follow up with physician  [] Other    Prognosis for POC: [x] Good [] Fair  [] Poor      Patient requires continued skilled intervention: [x] Yes  [] No    Treatment/Activity Tolerance:  [x] Patient able to complete treatment  [] Patient limited by fatigue  [] Patient limited by pain    [] Patient limited by other medical complications  [] Other:         PLAN: Continue 1x per week  [x] Continue per plan of care [] Alter current plan (see comments above)  [] Plan of care initiated [] Hold pending MD visit [] Discharge      Electronically signed by:  Donnise Boxer, [address] ,SPT Therapist was present, directed the patient's care, made skilled judgement, and was responsible for assessment and treatment of the patient. Note: If patient does not return for scheduled/ recommended follow up visits, this note will serve as a discharge from care along with most recent update on progress.

## 2023-05-24 ENCOUNTER — HOSPITAL ENCOUNTER (OUTPATIENT)
Dept: PHYSICAL THERAPY | Age: 68
Setting detail: THERAPIES SERIES
Discharge: HOME | End: 2023-05-24
Payer: MEDICARE

## 2023-05-24 PROCEDURE — 97110 THERAPEUTIC EXERCISES: CPT | Performed by: PHYSICAL THERAPIST

## 2023-05-24 PROCEDURE — 97140 MANUAL THERAPY 1/> REGIONS: CPT | Performed by: PHYSICAL THERAPIST

## 2023-05-24 NOTE — FLOWSHEET NOTE
[facility], [address] [place]  Phone: [phone]  Fax [phone]        Date:  2023    Patient Name:  Fracisco Barrera    :  1955  MRN: [id number]  Restrictions/Precautions:    Medical/Treatment Diagnosis Information:  Right shoulder pain, unspecified chronicity [M25.511]  Nontraumatic complete tear of right rotator cuff [M75.121]     Treatment DX: Right Shoulder Pain M25.511                                     Insurance information:  MEDICARE  Physician Information:  Lang Rodriguez MD  Has the plan of care been signed (Y/N):        []  Yes  [x]  No     Date of Patient follow up with Physician: 23      Is this a Progress Report:     []  Yes  [x]  No        If Yes:  Date Range for reporting period:  Beginning 23  Ending    Progress report will be due (10 Rx or 30 days whichever is less): 01       Recertification will be due (POC Duration  / 90 days whichever is less): 12 weeks      Visit # Insurance Allowable Auth Required   In-person 5 [facility] []  Yes []  No    Telehealth   []  Yes []  No    Total            Functional Scale: Regan Bisi = 14%   Date assessed:  23      Number of Comorbidities:  []0     []1-2    [x]3+    Latex Allergy:  [x]NO      []YES  Preferred Language for Healthcare:   [x]English       []other:      Pain level:  2/10     SUBJECTIVE:  Pt reports she increased her repetitions at home on resisted exercises and may have overdid it. R shoulder was sore for about a day.     OBJECTIVE: See eval  Observation:   Test measurements:      RESTRICTIONS/PRECAUTIONS: HBP, diabetes type II, Hx LBP, L knee OA, osteopenia    Exercises/Interventions:     Therapeutic Ex (84533) Sets/sec Reps Notes/CUES   Supine Scap Punches  10x2 HEP: tactile cues for form   Sidelying Shoulder Abd  10x2 hep   Sidelying ER  10x    Scapular Retraction with small ER ROM  10x2 HEP   TB rows/ext

## 2023-05-31 ENCOUNTER — HOSPITAL ENCOUNTER (OUTPATIENT)
Dept: PHYSICAL THERAPY | Age: 68
Setting detail: THERAPIES SERIES
Discharge: HOME | End: 2023-05-31
Payer: MEDICARE

## 2023-05-31 PROCEDURE — 97140 MANUAL THERAPY 1/> REGIONS: CPT | Performed by: PHYSICAL THERAPIST

## 2023-05-31 PROCEDURE — 97110 THERAPEUTIC EXERCISES: CPT | Performed by: PHYSICAL THERAPIST

## 2023-05-31 NOTE — FLOWSHEET NOTE
Girth Left Right Post Vaso               [] ICD 10: R60.9 Edema unspecified  [] ICD 10: R22.3 Localized swelling of upper limb  [] ICD 10: R60.1 Generalized edema  [] ICD 10: R60.9 Edema unspecified      Charges  Timed Code Treatment Minutes: 30   Total Treatment Minutes: 45     Medicare total (add KX at $2000)  433.26         [] EVAL (LOW) 36621   [] EVAL (MOD) 80492   [] EVAL (HIGH) 13941   [] RE-EVAL     [x] JQ(44719) x 1    [] IONTO  [] NMR (56949) x     [] VASO  [x] Manual (17257) x 1     [x] Other: ice  [] TA x      [] Mech Traction (08881)  [] ES(attended) (35612)      [] ES (un) (37078):     GOALS: \"To be pain free and gain mobility. \"  [] Progressing: [] Met: [] Not Met: [] Adjusted     Therapist goals for Patient:   Short Term Goals: To be achieved in: 2 weeks  1. Independent in HEP and progression per patient tolerance, in order to prevent re-injury. [] Progressing: [x] Met: [] Not Met: [] Adjusted  2. Patient will have a decrease in pain to facilitate improvement in movement, function, and ADLs as indicated by Functional Deficits. [] Progressing: [] Met: [] Not Met: [] Adjusted     Long Term Goals: To be achieved in: 12 weeks  1. Disability index score of 5% or less per Chantel Section to assist with reaching prior level of function. [] Progressing: [] Met: [] Not Met: [] Adjusted  2. Patient will demonstrate increased AROM flexion to 165 or greater to allow for proper joint functioning to reach for items out of the cabinet. [x] Progressing: [] Met: [] Not Met: [] Adjusted  3. Patient will demonstrate an increase in R shoulder strength to 4+/5-5/5 to allow for proper functional mobility to lift light to moderate items for daily activities. [x] Progressing: [] Met: [] Not Met: [] Adjusted  4. 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 [] Progressing: [] Met: [] Not Met: [] Adjusted  5.  Pt will demonstrate the ability to

## 2023-06-07 ENCOUNTER — HOSPITAL ENCOUNTER (OUTPATIENT)
Dept: PHYSICAL THERAPY | Age: 68
Setting detail: THERAPIES SERIES
Discharge: HOME | End: 2023-06-07
Payer: MEDICARE

## 2023-06-07 PROCEDURE — 97110 THERAPEUTIC EXERCISES: CPT | Performed by: PHYSICAL THERAPIST

## 2023-06-07 PROCEDURE — 97140 MANUAL THERAPY 1/> REGIONS: CPT | Performed by: PHYSICAL THERAPIST

## 2023-06-07 NOTE — FLOWSHEET NOTE
driving/computer work.    [] (16749) Provided verbal/tactile cueing for activities related to improving balance, coordination, kinesthetic sense, posture, motor skill, proprioception  to assist with  scapular, scapulothoracic and UE control with self care, reaching, carrying, lifting, house/yardwork, driving/computer work. Therapeutic Activities:    [] (66448 or 63025) Provided verbal/tactile cueing for activities related to improving balance, coordination, kinesthetic sense, posture, motor skill, proprioception and motor activation to allow for proper function of scapular, scapulothoracic and UE control with self care, carrying, lifting, driving/computer work. Home Exercise Program:    [x] (08816) Reviewed/Progressed HEP activities related to strengthening, flexibility, endurance, ROM of scapular, scapulothoracic and UE control with self care, reaching, carrying, lifting, house/yardwork, driving/computer work  [] (28107) Reviewed/Progressed HEP activities related to improving balance, coordination, kinesthetic sense, posture, motor skill, proprioception of scapular, scapulothoracic and UE control with self care, reaching, carrying, lifting, house/yardwork, driving/computer work      Manual Treatments:  PROM / STM / Oscillations-Mobs:  G-I, II, III, IV (PA's, Inf., Post.)  [x] (28260) Provided manual therapy to mobilize soft tissue/joints of cervical/CT, scapular GHJ and UE for the purpose of modulating pain, promoting relaxation,  increasing ROM, reducing/eliminating soft tissue swelling/inflammation/restriction, improving soft tissue extensibility and allowing for proper ROM for normal function with self care, reaching, carrying, lifting, house/yardwork, driving/computer work    Modalities:  Seated ice R shoulder- 10 mins  [] GAME READY (VASO)- for significant edema, swelling, pain control. Vasopneumatic compression applied to  for significant edema, swelling, pain control.       Girth Left Right Post Vaso

## 2023-06-22 DIAGNOSIS — I10 ESSENTIAL HYPERTENSION: ICD-10-CM

## 2023-06-28 RX ORDER — LISINOPRIL 40 MG/1
40 TABLET ORAL DAILY
Qty: 90 TABLET | Refills: 3 | OUTPATIENT
Start: 2023-06-28

## 2023-11-01 ENCOUNTER — TELEPHONE (OUTPATIENT)
Dept: ORTHOPEDIC SURGERY | Age: 68
End: 2023-11-01

## 2023-11-01 DIAGNOSIS — M22.42 CHONDROMALACIA OF LEFT PATELLA: ICD-10-CM

## 2023-11-01 DIAGNOSIS — M25.562 LEFT KNEE PAIN, UNSPECIFIED CHRONICITY: ICD-10-CM

## 2023-11-01 DIAGNOSIS — M17.12 PRIMARY OSTEOARTHRITIS OF LEFT KNEE: Primary | ICD-10-CM

## 2023-11-01 RX ORDER — METHYLPREDNISOLONE 4 MG/1
TABLET ORAL
Qty: 21 KIT | Refills: 0 | Status: SHIPPED | OUTPATIENT
Start: 2023-11-01

## 2023-11-01 NOTE — TELEPHONE ENCOUNTER
General Question     Subject: L KNEE  Patient and /or Facility Request: Kimberly Goldstein  Contact Number: [phone]  PT WANTED A CLL BK FROM DR. BEAULIEU NURSE ABOUT HER KNEE   APPT [facility] FOR 11/8

## 2023-11-01 NOTE — TELEPHONE ENCOUNTER
SPOKE TO PATIENT, HAD A FLARE OF KNEE OA MID OCTOBER. HAS NOT BEEN GETTING ANY BETTER. MADE APPT FOR 11/8. I MOVED HER APPT UP TO 11/6 AND SPOKE WITH DR. Lizette Oleary. HE OFFERED HER A STEROID PACK TO TRY PRIOR TO HER APPOINTMENT WITH US NEXT WEEK. PATIENT ALSO TALKED ABOUT TROUBLE AMBULATING AT  [employer]ALL [employer]. I RECOMMENDED A CANE IN ADDITION TO HER KNEE BRACE. AMBULATING AT FOOTBALL GAMES WILL BE DIFFICULT FOR HER. WILL SEE PATIENT MONDAY, SHE VOICED AGREEMENT AND UNDERSTANDING OF PLAN. WILL STOP NSAIDS WHILE ON STEROID PACK.

## 2023-11-06 ENCOUNTER — OFFICE VISIT (OUTPATIENT)
Dept: ORTHOPEDIC SURGERY | Age: 68
End: 2023-11-06
Payer: MEDICARE

## 2023-11-06 DIAGNOSIS — M51.36 DDD (DEGENERATIVE DISC DISEASE), LUMBAR: ICD-10-CM

## 2023-11-06 DIAGNOSIS — M25.511 RIGHT SHOULDER PAIN, UNSPECIFIED CHRONICITY: ICD-10-CM

## 2023-11-06 DIAGNOSIS — M22.42 CHONDROMALACIA OF LEFT PATELLA: ICD-10-CM

## 2023-11-06 DIAGNOSIS — M75.121 NONTRAUMATIC COMPLETE TEAR OF RIGHT ROTATOR CUFF: ICD-10-CM

## 2023-11-06 DIAGNOSIS — M43.16 SPONDYLOLISTHESIS OF LUMBAR REGION: ICD-10-CM

## 2023-11-06 DIAGNOSIS — M17.12 PRIMARY OSTEOARTHRITIS OF LEFT KNEE: Primary | ICD-10-CM

## 2023-11-06 DIAGNOSIS — M25.562 LEFT KNEE PAIN, UNSPECIFIED CHRONICITY: ICD-10-CM

## 2023-11-06 DIAGNOSIS — M54.50 LUMBAR SPINE PAIN: ICD-10-CM

## 2023-11-06 PROCEDURE — G8400 PT W/DXA NO RESULTS DOC: HCPCS | Performed by: FAMILY MEDICINE

## 2023-11-06 PROCEDURE — G8417 CALC BMI ABV UP PARAM F/U: HCPCS | Performed by: FAMILY MEDICINE

## 2023-11-06 PROCEDURE — 1090F PRES/ABSN URINE INCON ASSESS: CPT | Performed by: FAMILY MEDICINE

## 2023-11-06 PROCEDURE — G8427 DOCREV CUR MEDS BY ELIG CLIN: HCPCS | Performed by: FAMILY MEDICINE

## 2023-11-06 PROCEDURE — 1036F TOBACCO NON-USER: CPT | Performed by: FAMILY MEDICINE

## 2023-11-06 PROCEDURE — G8484 FLU IMMUNIZE NO ADMIN: HCPCS | Performed by: FAMILY MEDICINE

## 2023-11-06 PROCEDURE — 1123F ACP DISCUSS/DSCN MKR DOCD: CPT | Performed by: FAMILY MEDICINE

## 2023-11-06 PROCEDURE — 99213 OFFICE O/P EST LOW 20 MIN: CPT | Performed by: FAMILY MEDICINE

## 2023-11-06 PROCEDURE — 3017F COLORECTAL CA SCREEN DOC REV: CPT | Performed by: FAMILY MEDICINE

## 2023-11-06 NOTE — PROGRESS NOTES
RESIDENT/ATTENDING ATTESTATION:    After medical student evaluation and exam, I independently gathered patients history, independently did a physical, and agree with A/P as written in medical student's note (other than clarified below). Please see below for additional information documented by the resident/attending including the A/P. Chief Complaint  Knee Pain (CK L KNEE )    Reevaluation recurrent persistent symptomatic left and right knee pain with difficulty walking distances and pivoting with occasional left knee pseudo buckling with advanced left right knee bone-on-bone changes medial compartment with bilateral patellofemoral arthropathy status post completion of viscosupplementation 4/17/2023    History of right shoulder pain with underlying glenohumeral arthropathy with cuff tendinopathy with history of weakness and motion loss    History of mechanical low back pain with recurrent left leg pain and history of chronic right suspected L5 radiculopathy established with Alpesh Mercedes    History of Present Illness:  Laura Sosa is a 77 y.o. female who is a very pleasant white female type II diabetic with last A1c 7.1 is a very nice patient of Bill Ochoa CNP who is being seen today for evaluation of progressive pain mildly to the lower back with achy symptoms and limited ability to walk distances in her legs bilaterally. She states that she has been having episodic achiness to her legs with subjective weakness for about 3 years but states that over the past years become more pronounced that if she walks more than 100 yards she will have to stop secondary to some achiness to her legs. If she rests more than a minute or 2 it does improve to the point where she is able to resume her activities. There is no history of injury or trauma prior to becoming symptomatic although her mother did require a laminectomy at L4-5 secondary to radiculopathy.   On questioning her last year she did have

## 2023-12-21 LAB
CHOLESTEROL, TOTAL: 184 MG/DL
CHOLESTEROL/HDL RATIO: NORMAL
ESTIMATED AVERAGE GLUCOSE: NORMAL
HBA1C MFR BLD: 7.9 %
HDLC SERPL-MCNC: 55 MG/DL (ref 35–70)
LDL CHOLESTEROL: 104
NONHDLC SERPL-MCNC: 129 MG/DL
TRIGL SERPL-MCNC: 127 MG/DL
VLDLC SERPL CALC-MCNC: NORMAL MG/DL

## 2024-01-16 ENCOUNTER — TELEPHONE (OUTPATIENT)
Dept: FAMILY MEDICINE CLINIC | Age: 69
End: 2024-01-16

## 2024-01-16 NOTE — TELEPHONE ENCOUNTER
Dr. Merchant is not taking new patients. If patient wants an MD I would suggest Dr. Schumacher or Dr. Ayala

## 2024-01-16 NOTE — TELEPHONE ENCOUNTER
FYI  Pt notified. States she wants to stay with a woman. Scheduled her medicare awv, she states she did have another provider but was not satisfied with that provider. Pt wants to stay with you.

## 2024-01-16 NOTE — TELEPHONE ENCOUNTER
----- Message from Natasha Bolck sent at 1/16/2024  3:12 PM EST -----  Subject: Appointment Request    Reason for Call: New Patient/New to Provider Appointment needed: New   Patient Request Appointment    QUESTIONS    Reason for appointment request? Requested Provider unavailable - Dr. Merchant     Additional Information for Provider? Current patient of Tonya Valdez,   would like to transfer to an MD prefers Dr. Maria Elena Merchant MD  ---------------------------------------------------------------------------  --------------  CALL BACK INFO  [phone]; OK to leave message on voicemail  ---------------------------------------------------------------------------  --------------  SCRIPT ANSWERS

## 2024-01-25 SDOH — HEALTH STABILITY: PHYSICAL HEALTH
ON AVERAGE, HOW MANY DAYS PER WEEK DO YOU ENGAGE IN MODERATE TO STRENUOUS EXERCISE (LIKE A BRISK WALK)?: PATIENT DECLINED

## 2024-01-25 SDOH — HEALTH STABILITY: PHYSICAL HEALTH: ON AVERAGE, HOW MANY MINUTES DO YOU ENGAGE IN EXERCISE AT THIS LEVEL?: PATIENT DECLINED

## 2024-01-25 ASSESSMENT — PATIENT HEALTH QUESTIONNAIRE - PHQ9
2. FEELING DOWN, DEPRESSED OR HOPELESS: 0
SUM OF ALL RESPONSES TO PHQ QUESTIONS 1-9: 0
SUM OF ALL RESPONSES TO PHQ QUESTIONS 1-9: 0
1. LITTLE INTEREST OR PLEASURE IN DOING THINGS: 0
SUM OF ALL RESPONSES TO PHQ QUESTIONS 1-9: 0
SUM OF ALL RESPONSES TO PHQ9 QUESTIONS 1 & 2: 0
SUM OF ALL RESPONSES TO PHQ QUESTIONS 1-9: 0

## 2024-01-25 ASSESSMENT — LIFESTYLE VARIABLES
HOW OFTEN DO YOU HAVE SIX OR MORE DRINKS ON ONE OCCASION: 1
HOW MANY STANDARD DRINKS CONTAINING ALCOHOL DO YOU HAVE ON A TYPICAL DAY: 1 OR 2
HOW OFTEN DO YOU HAVE A DRINK CONTAINING ALCOHOL: 2
HOW MANY STANDARD DRINKS CONTAINING ALCOHOL DO YOU HAVE ON A TYPICAL DAY: 1
HOW OFTEN DO YOU HAVE A DRINK CONTAINING ALCOHOL: MONTHLY OR LESS

## 2024-01-28 SDOH — ECONOMIC STABILITY: INCOME INSECURITY: HOW HARD IS IT FOR YOU TO PAY FOR THE VERY BASICS LIKE FOOD, HOUSING, MEDICAL CARE, AND HEATING?: NOT HARD AT ALL

## 2024-01-28 SDOH — ECONOMIC STABILITY: FOOD INSECURITY: WITHIN THE PAST 12 MONTHS, YOU WORRIED THAT YOUR FOOD WOULD RUN OUT BEFORE YOU GOT MONEY TO BUY MORE.: NEVER TRUE

## 2024-01-28 SDOH — ECONOMIC STABILITY: HOUSING INSECURITY
IN THE LAST 12 MONTHS, WAS THERE A TIME WHEN YOU DID NOT HAVE A STEADY PLACE TO SLEEP OR SLEPT IN A SHELTER (INCLUDING NOW)?: NO

## 2024-01-28 SDOH — ECONOMIC STABILITY: TRANSPORTATION INSECURITY
IN THE PAST 12 MONTHS, HAS LACK OF TRANSPORTATION KEPT YOU FROM MEETINGS, WORK, OR FROM GETTING THINGS NEEDED FOR DAILY LIVING?: NO

## 2024-01-28 SDOH — ECONOMIC STABILITY: FOOD INSECURITY: WITHIN THE PAST 12 MONTHS, THE FOOD YOU BOUGHT JUST DIDN'T LAST AND YOU DIDN'T HAVE MONEY TO GET MORE.: NEVER TRUE

## 2024-01-29 ENCOUNTER — OFFICE VISIT (OUTPATIENT)
Dept: FAMILY MEDICINE CLINIC | Age: 69
End: 2024-01-29
Payer: MEDICARE

## 2024-01-29 VITALS
DIASTOLIC BLOOD PRESSURE: 90 MMHG | BODY MASS INDEX: 32.84 KG/M2 | OXYGEN SATURATION: 97 % | HEART RATE: 118 BPM | SYSTOLIC BLOOD PRESSURE: 162 MMHG | WEIGHT: 216 LBS

## 2024-01-29 DIAGNOSIS — M21.611 BUNION, RIGHT FOOT: ICD-10-CM

## 2024-01-29 DIAGNOSIS — Z00.00 MEDICARE ANNUAL WELLNESS VISIT, SUBSEQUENT: ICD-10-CM

## 2024-01-29 DIAGNOSIS — F41.9 ANXIETY: ICD-10-CM

## 2024-01-29 DIAGNOSIS — B37.2 YEAST DERMATITIS: ICD-10-CM

## 2024-01-29 DIAGNOSIS — Z12.31 ENCOUNTER FOR SCREENING MAMMOGRAM FOR BREAST CANCER: ICD-10-CM

## 2024-01-29 DIAGNOSIS — E11.9 TYPE 2 DIABETES MELLITUS WITHOUT COMPLICATION, WITHOUT LONG-TERM CURRENT USE OF INSULIN (HCC): Primary | ICD-10-CM

## 2024-01-29 DIAGNOSIS — R00.0 TACHYCARDIA: ICD-10-CM

## 2024-01-29 DIAGNOSIS — Z78.0 ASYMPTOMATIC MENOPAUSAL STATE: ICD-10-CM

## 2024-01-29 DIAGNOSIS — I10 PRIMARY HYPERTENSION: ICD-10-CM

## 2024-01-29 PROCEDURE — G0439 PPPS, SUBSEQ VISIT: HCPCS | Performed by: NURSE PRACTITIONER

## 2024-01-29 PROCEDURE — 99215 OFFICE O/P EST HI 40 MIN: CPT | Performed by: NURSE PRACTITIONER

## 2024-01-29 PROCEDURE — 93000 ELECTROCARDIOGRAM COMPLETE: CPT | Performed by: NURSE PRACTITIONER

## 2024-01-29 PROCEDURE — 1123F ACP DISCUSS/DSCN MKR DOCD: CPT | Performed by: NURSE PRACTITIONER

## 2024-01-29 PROCEDURE — 3079F DIAST BP 80-89 MM HG: CPT | Performed by: NURSE PRACTITIONER

## 2024-01-29 PROCEDURE — 3077F SYST BP >= 140 MM HG: CPT | Performed by: NURSE PRACTITIONER

## 2024-01-29 RX ORDER — NYSTATIN 100000 U/G
CREAM TOPICAL
Qty: 30 G | Refills: 1 | Status: SHIPPED | OUTPATIENT
Start: 2024-01-29

## 2024-01-29 RX ORDER — METOPROLOL SUCCINATE 50 MG/1
50 TABLET, EXTENDED RELEASE ORAL DAILY
Qty: 30 TABLET | Refills: 3 | Status: SHIPPED | OUTPATIENT
Start: 2024-01-29

## 2024-01-29 RX ORDER — HYDROCHLOROTHIAZIDE 12.5 MG/1
12.5 CAPSULE, GELATIN COATED ORAL EVERY MORNING
Qty: 90 CAPSULE | Refills: 1 | Status: SHIPPED | OUTPATIENT
Start: 2024-01-29 | End: 2024-01-29 | Stop reason: ALTCHOICE

## 2024-01-29 RX ORDER — NYSTATIN 100000 [USP'U]/G
POWDER TOPICAL
Qty: 60 G | Refills: 1 | Status: SHIPPED | OUTPATIENT
Start: 2024-01-29

## 2024-01-29 NOTE — PROGRESS NOTES
Medicare Annual Wellness Visit    Alyse Reynoso is here for Medicare AWV    Assessment & Plan   Type 2 diabetes mellitus without complication, without long-term current use of insulin (HCC)  Continue metformin 750 mg XR  -     Diabetic Foot Exam  -     Microalbumin / Creatinine Urine Ratio  -     Geovanna Isabel RDN, Diabetes Education (Non Care Coord Patient), [facility] Medicare annual wellness visit, subsequent  Asymptomatic menopausal state  -     DEXA Bone Density Axial Skeleton; Future  Encounter for screening mammogram for breast cancer  -     [facility] Digital Screen Bilateral; Future  Bunion, right foot  Can also look at the callous on her feet  -     AFL - Geovanna Coffey DPALBERTO, Podiatry, [facility]  Primary hypertension  Add metoprolol 50mg XL to lisinopril 40mg daily    -     metoprolol succinate (TOPROL XL) 50 MG extended release tablet; Take 1 tablet by mouth daily, Disp-30 tablet, R-3D/c HCTZNormal  -     EKG 12 Lead - Clinic Performed: sinus tachycardia with ectopic ventricular couplets- left atrial enlargement   Anxiety  Start med for anxiety  -     sertraline (ZOLOFT) 50 MG tablet; Take 1 tablet by mouth daily, Disp-90 tablet, R-1Normal  Tachycardia  -     Peter Guerrier DO, Cardiology, [facility]    Recommendations for Preventive Services Due: see orders and patient instructions/AVS.  Recommended screening schedule for the next 5-10 years is provided to the patient in written form: see Patient Instructions/AVS.     Return in about 1 month (around 2/29/2024) for f/u HTN.     Subjective   The following acute and/or chronic problems were also addressed today:  DM: taking metformin 750mg XR- tolerating  HTN: lisinopril 40mg daily- has been taking- denies headaches, SOB, Chest pain or dizziness- does have palpitations  Anxiety: has not taken meds before- daughters take zoloft- also may be interested in [facility]    Patient's complete Health Risk Assessment and screening values have

## 2024-01-29 NOTE — PATIENT INSTRUCTIONS
Please make an appointment with one of our Behavioral Health Consultants, Dr. Paul or Esau Champion MA. They will work with you to develop a plan to improve your overall well-being by addressing any behavioral or mental health factors that are influencing your health. You can schedule your appointment just like you schedule your other appointments here, at the  or over the phone. Each appointment will be 30 minutes long, and you will typically be seen every 2-4 weeks. Your insurance should cover the visit, but you may owe a specialist copay. If you would prefer to be seen by a therapist more frequently, or for a longer visit, please ask your Primary Care Provider for a recommendation.      Learning About Vision Tests  What are vision tests?     The four most common vision tests are visual acuity tests, refraction, visual field tests, and color vision tests.  Visual acuity (sharpness) tests  These tests are used:  To see if you need glasses or contact lenses.  To monitor an eye problem.  To check an eye injury.  Visual acuity tests are done as part of routine exams. You may also have this test when you get your 's license or apply for some types of jobs.  Visual field tests  These tests are used:  To check for vision loss in any area of your range of vision.  To screen for certain eye diseases.  To look for nerve damage after a stroke, head injury, or other problem that could reduce blood flow to the brain.  Refraction and color tests  A refraction test is done to find the right prescription for glasses and contact lenses.  A color vision test is done to check for color blindness.  Color vision is often tested as part of a routine exam. You may also have this test when you apply for a job where recognizing different colors is important, such as , electronics, or the .  How are vision tests done?  Visual acuity test   You cover one eye at a time.  You read aloud from a wall chart

## 2024-01-30 ENCOUNTER — PATIENT MESSAGE (OUTPATIENT)
Dept: FAMILY MEDICINE CLINIC | Age: 69
End: 2024-01-30

## 2024-01-30 LAB
CREAT UR-MCNC: 120.4 MG/DL (ref 28–259)
MICROALBUMIN UR DL<=1MG/L-MCNC: <1.2 MG/DL
MICROALBUMIN/CREAT UR: NORMAL MG/G (ref 0–30)

## 2024-01-31 DIAGNOSIS — R00.0 TACHYCARDIA: Primary | ICD-10-CM

## 2024-01-31 NOTE — TELEPHONE ENCOUNTER
The referral for the dietician I placed at the appt. They should be able to see that in our system.

## 2024-01-31 NOTE — TELEPHONE ENCOUNTER
Sometimes the report will show left atrial enlargement and it really isn't so the cardiologist will likely repeat the EKG and can discuss with her- but over time high blood pressure and prolonged tachycardia can strain the heart so just important to see cardiology

## 2024-01-31 NOTE — TELEPHONE ENCOUNTER
Referral placed for Dr. Aguilar as well now- may have the information on the referral that you need

## 2024-01-31 NOTE — TELEPHONE ENCOUNTER
From: Alyse Reynoso  To: Tonya Valdez  Sent: 1/30/2024 8:34 PM EST  Subject: Test results and referral    Hi Tonya,   I have a question about my EKG results yesterday. I noticed last night that the results said I have a left atrial enlargement in addition to the tachycardia. Will that resolve when my blood pressure goes down or is that a permanent condition? How concerned should I be about this?    I also tried to make an appointment with the dietician you recommended and they said they needed a referral from an MD. Could you send that to them for me?     I contacted my 's cardiologist, Dr. Jose J Aguilar , to make an appointment and they needed a referral too. I've known him for almost twenty years and I thought that would be a good place for me to go. He is with [facility]. We go to his office in [place].     Thank you so much!  Alyse Reynoso

## 2024-02-26 LAB
ESTIMATED AVERAGE GLUCOSE: 180
HBA1C MFR BLD: 7.9 %

## 2024-04-19 LAB
BASOPHILS ABSOLUTE: 0.08 /ΜL
BASOPHILS RELATIVE PERCENT: 1.2 %
BUN BLDV-MCNC: 30 MG/DL
CALCIUM SERPL-MCNC: 10.1 MG/DL
CHLORIDE BLD-SCNC: 103 MMOL/L
CO2: 20 MMOL/L
CREAT SERPL-MCNC: 0.87 MG/DL
EGFR: 72
EOSINOPHILS ABSOLUTE: 0.68 /ΜL
EOSINOPHILS RELATIVE PERCENT: 10.4 %
ESTIMATED AVERAGE GLUCOSE: 174
GLUCOSE BLD-MCNC: 187 MG/DL
HBA1C MFR BLD: 7.7 %
HCT VFR BLD CALC: 41.3 % (ref 36–46)
HEMOGLOBIN: 13.6 G/DL (ref 12–16)
INR BLD: 0.8
LYMPHOCYTES ABSOLUTE: 2.5 /ΜL
LYMPHOCYTES RELATIVE PERCENT: 38.1 %
MCH RBC QN AUTO: 29.8 PG
MCHC RBC AUTO-ENTMCNC: 32.9 G/DL
MCV RBC AUTO: 90.4 FL
MONOCYTES ABSOLUTE: 0.5 /ΜL
MONOCYTES RELATIVE PERCENT: 7.6 %
NEUTROPHILS ABSOLUTE: 2.79 /ΜL
NEUTROPHILS RELATIVE PERCENT: 42.4 %
PLATELET # BLD: 277 K/ΜL
PMV BLD AUTO: 8.8 FL
POTASSIUM SERPL-SCNC: 4.6 MMOL/L
PROTHROMBIN TIME: 10.3
RBC # BLD: 4.57 10^6/ΜL
SODIUM BLD-SCNC: 135 MMOL/L
WBC # BLD: 6.57 10^3/ML

## 2024-05-11 DIAGNOSIS — R30.0 DYSURIA: Primary | ICD-10-CM

## 2024-05-11 RX ORDER — SULFAMETHOXAZOLE AND TRIMETHOPRIM 800; 160 MG/1; MG/1
1 TABLET ORAL 2 TIMES DAILY
Qty: 10 TABLET | Refills: 0 | Status: SHIPPED | OUTPATIENT
Start: 2024-05-11 | End: 2024-05-16

## 2024-06-18 ENCOUNTER — ENROLLMENT (OUTPATIENT)
Dept: PHARMACY | Facility: CLINIC | Age: 69
End: 2024-06-18

## 2024-06-28 ENCOUNTER — COMMUNITY OUTREACH (OUTPATIENT)
Dept: FAMILY MEDICINE CLINIC | Age: 69
End: 2024-06-28

## 2024-08-10 DIAGNOSIS — I10 ESSENTIAL HYPERTENSION: ICD-10-CM

## 2024-08-12 RX ORDER — LISINOPRIL 40 MG/1
40 TABLET ORAL DAILY
Qty: 90 TABLET | Refills: 0 | Status: SHIPPED | OUTPATIENT
Start: 2024-08-12

## 2024-11-05 DIAGNOSIS — I10 ESSENTIAL HYPERTENSION: ICD-10-CM

## 2024-11-05 RX ORDER — LISINOPRIL 40 MG/1
40 TABLET ORAL DAILY
Qty: 90 TABLET | Refills: 3 | Status: SHIPPED | OUTPATIENT
Start: 2024-11-05

## 2024-11-20 ENCOUNTER — TELEPHONE (OUTPATIENT)
Dept: PHARMACY | Facility: CLINIC | Age: 69
End: 2024-11-20

## 2024-11-21 ENCOUNTER — OFFICE VISIT (OUTPATIENT)
Dept: FAMILY MEDICINE CLINIC | Age: 69
End: 2024-11-21

## 2024-11-21 VITALS
HEIGHT: 68 IN | DIASTOLIC BLOOD PRESSURE: 80 MMHG | OXYGEN SATURATION: 98 % | HEART RATE: 118 BPM | BODY MASS INDEX: 31.83 KG/M2 | WEIGHT: 210 LBS | SYSTOLIC BLOOD PRESSURE: 150 MMHG

## 2024-11-21 DIAGNOSIS — I10 ESSENTIAL HYPERTENSION: ICD-10-CM

## 2024-11-21 DIAGNOSIS — Z98.890 HISTORY OF RECENT SURGERY: ICD-10-CM

## 2024-11-21 DIAGNOSIS — L84 CORN OF TOE: ICD-10-CM

## 2024-11-21 DIAGNOSIS — E11.65 TYPE 2 DIABETES MELLITUS WITH HYPERGLYCEMIA, WITHOUT LONG-TERM CURRENT USE OF INSULIN (HCC): Primary | ICD-10-CM

## 2024-11-21 DIAGNOSIS — F41.9 ANXIETY: ICD-10-CM

## 2024-11-21 LAB — HBA1C MFR BLD: 7.6 %

## 2024-11-21 RX ORDER — METOPROLOL SUCCINATE 25 MG/1
25 TABLET, EXTENDED RELEASE ORAL DAILY PRN
COMMUNITY

## 2024-11-21 RX ORDER — METFORMIN HYDROCHLORIDE 500 MG/1
TABLET, EXTENDED RELEASE ORAL
COMMUNITY
Start: 2024-10-29 | End: 2024-11-21 | Stop reason: ALTCHOICE

## 2024-11-21 RX ORDER — AMOXICILLIN 500 MG/1
2000 TABLET, FILM COATED ORAL ONCE
Qty: 4 TABLET | Refills: 0 | Status: SHIPPED | OUTPATIENT
Start: 2024-11-21 | End: 2024-11-21

## 2024-11-21 RX ORDER — CLOTRIMAZOLE 1 %
CREAM (GRAM) TOPICAL 2 TIMES DAILY
COMMUNITY

## 2024-11-21 RX ORDER — METFORMIN HYDROCHLORIDE 750 MG/1
750 TABLET, EXTENDED RELEASE ORAL 2 TIMES DAILY WITH MEALS
Qty: 180 TABLET | Refills: 2 | Status: SHIPPED | OUTPATIENT
Start: 2024-11-21

## 2024-11-21 RX ORDER — BUSPIRONE HYDROCHLORIDE 5 MG/1
5 TABLET ORAL 3 TIMES DAILY PRN
Qty: 90 TABLET | Refills: 0 | Status: SHIPPED | OUTPATIENT
Start: 2024-11-21 | End: 2024-12-21

## 2024-11-21 ASSESSMENT — ENCOUNTER SYMPTOMS
SHORTNESS OF BREATH: 0
WHEEZING: 0

## 2024-11-21 NOTE — ASSESSMENT & PLAN NOTE
Continue to see psyhciatrist    Orders:    busPIRone (BUSPAR) 5 MG tablet; Take 1 tablet by mouth 3 times daily as needed (anxiety)     yes

## 2024-11-21 NOTE — PROGRESS NOTES
Patient: Alyse Reynoso is a 69 y.o. female who presents today with the following Chief Complaint(s):  Chief Complaint   Patient presents with    Diabetes         HPI  Here to follow up on DM- was seeing someone at [facility] but states she wants to come back here for routine care.     DM: metformin 750 mg in the morning and 500mg XR at night   HTN: lisinopril 40mg   Has metoprolol 25 mg XL to take from Dr. Aguilar- she states she  was told that her BP and pulse are related to anxiety. She states he told her to take the metoprolol before doctor visits if that makes her anxious.     Anxiety: as been offered daily meds in the past but has not taken them.   She is willing to try buspar. Seeing Javed Melendez-- psychiatrist     Corn on 4th toe right foot and bunion on right foot    Has a dental procedure coming up and had recent mohs surgery on lower leg       Current Outpatient Medications   Medication Sig Dispense Refill    clotrimazole (LOTRIMIN) 1 % cream Apply topically 2 times daily Apply topically 2 times daily.      metFORMIN (GLUCOPHAGE-XR) 750 MG extended release tablet Take 1 tablet by mouth with breakfast and with evening meal 180 tablet 2    metoprolol succinate (TOPROL XL) 25 MG extended release tablet Take 1 tablet by mouth daily as needed      amoxicillin (AMOXIL) 500 MG tablet Take 4 tablets by mouth once for 1 dose 4 tablet 0    busPIRone (BUSPAR) 5 MG tablet Take 1 tablet by mouth 3 times daily as needed (anxiety) 90 tablet 0    lisinopril (PRINIVIL;ZESTRIL) 40 MG tablet TAKE 1 TABLET BY MOUTH DAILY 90 tablet 3     No current facility-administered medications for this visit.       Patient's past medical history, surgical history, family history, medications,  andallergies  were all reviewed and updated as appropriate today.      Review of Systems   Respiratory:  Negative for shortness of breath and wheezing.    Cardiovascular:  Negative for chest pain and palpitations.   Skin:         Elkin on toe of

## 2024-11-21 NOTE — ASSESSMENT & PLAN NOTE
Chronic, not at goal (unstable), continue current treatment plan  Continue lisinopril 40mg daily, discussed to take the metoprolol 25mg XR an hour before her next appt here so we can see if it helps.

## 2024-11-21 NOTE — TELEPHONE ENCOUNTER
[facility] CLINICAL PHARMACY REVIEW: STATIN THERAPY    Patient was following at [facility] but now wants to come back to [facility] - had first appointment back with QIAN Thomas - ALYSSA today.     Jina Cash, PharmD, BCACP, BCGP  [facility] Pharmacist   [facility] Clinical Pharmacy  Department, toll free: [phone], option 1    =======================================================    For Pharmacy Admin Tracking Only  Program: [facility] [employer]  CPA in place:  No  Recommendation Provided To: Provider: 1 via Note to Provider  Intervention Detail: New Rx: 1, reason: Needs Additional Therapy  Intervention Accepted By: Provider: 0  Gap Closed?: No   Time Spent (min): 30

## 2024-11-21 NOTE — TELEPHONE ENCOUNTER
Tonya Valdez, APRN - CNP,      Patient's insurance has identified statin use in persons with diabetes (SUPD) care gap:   70yo female with Type 2 DM for 10+ years,  (goal <70), ASCVD risk score 36.8% - Would patient benefit from statin therapy?   Consider statin initiation at Thursday's visit.     Last visit: 01/29/2024, Next visit: 11/21/2024     See encounter note(s) below for complete details. Please let me know if you have any questions.      Thank you,  Jina Cash, PharmD, BCACP, BCGP  Population Health Pharmacist   [facility] Clinical Pharmacy  Department, toll free: [phone], option 1    =======================================================    POPULATION [facility] CLINICAL PHARMACY: STATIN THERAPY REVIEW  Identified statin use in persons with diabetes (SUPD) care gap per [employer]. Records dated: 11/20/24    Allergies   Allergen Reactions    Seasonal     Metformin And Related Nausea And Vomiting     STATIN GAP IDENTIFIED    Per Reconcile Dispense History as of 11/20/2024: No statin fill history    Lipid Panel, LFTs   Component Value Date/Time    CHOL 184 12/21/2023 12:00 AM    TRIG 127 12/21/2023 12:00 AM    HDL 55 12/21/2023 12:00 AM     12/21/2023 12:00 AM    ALT 21 05/10/2021 03:44 PM    AST 22 05/10/2021 03:44 PM      The 10-year ASCVD risk score (Med MEJIA, et al., 2019) is: 36.8%    Values used to calculate the score:      Age: 69 years      Sex: Female      Is Non- : No      Diabetic: Yes      Tobacco smoker: No      Systolic Blood Pressure: 162 mmHg      Is BP treated: Yes      HDL Cholesterol: 55 mg/dL      Total Cholesterol: 184 mg/dL     BP Readings from Last 3 Encounters:   01/29/24 (!) 162/90   12/07/21 (!) 160/82   05/10/21 (!) 148/80     Hemoglobin A1c   Component Value Date    LABA1C 7.7 04/19/2024    LABA1C 7.9 02/26/2024    LABA1C 7.9 12/21/2023     Renal Function   Component Value Date    LABGLOM 72 04/19/2024    CREATININE 0.87 04/19/2024

## 2024-12-19 ENCOUNTER — TELEPHONE (OUTPATIENT)
Dept: FAMILY MEDICINE CLINIC | Age: 69
End: 2024-12-19

## 2024-12-20 DIAGNOSIS — E11.65 TYPE 2 DIABETES MELLITUS WITH HYPERGLYCEMIA, WITHOUT LONG-TERM CURRENT USE OF INSULIN (HCC): ICD-10-CM

## 2024-12-20 RX ORDER — METFORMIN HYDROCHLORIDE 750 MG/1
750 TABLET, EXTENDED RELEASE ORAL 2 TIMES DAILY WITH MEALS
Qty: 180 TABLET | Refills: 2 | Status: SHIPPED | OUTPATIENT
Start: 2024-12-20

## 2024-12-20 NOTE — TELEPHONE ENCOUNTER
[facility] is requesting a rx for metFORMIN (GLUCOPHAGE-XR) 750 MG extended release tablet .    Last OV 11/21/2024      Next OV No future appointments.    Please use [facility]

## 2025-01-13 ENCOUNTER — OFFICE VISIT (OUTPATIENT)
Dept: ORTHOPEDIC SURGERY | Age: 70
End: 2025-01-13
Payer: MEDICARE

## 2025-01-13 VITALS — HEIGHT: 68 IN | BODY MASS INDEX: 31.83 KG/M2 | WEIGHT: 210 LBS

## 2025-01-13 DIAGNOSIS — M51.362 DEGENERATION OF INTERVERTEBRAL DISC OF LUMBAR REGION WITH DISCOGENIC BACK PAIN AND LOWER EXTREMITY PAIN: ICD-10-CM

## 2025-01-13 DIAGNOSIS — M54.16 RIGHT LUMBAR RADICULITIS: ICD-10-CM

## 2025-01-13 DIAGNOSIS — M43.16 SPONDYLOLISTHESIS OF LUMBAR REGION: ICD-10-CM

## 2025-01-13 DIAGNOSIS — M54.50 LUMBAR SPINE PAIN: Primary | ICD-10-CM

## 2025-01-13 PROCEDURE — 1159F MED LIST DOCD IN RCRD: CPT | Performed by: FAMILY MEDICINE

## 2025-01-13 PROCEDURE — 99214 OFFICE O/P EST MOD 30 MIN: CPT | Performed by: FAMILY MEDICINE

## 2025-01-13 PROCEDURE — 1123F ACP DISCUSS/DSCN MKR DOCD: CPT | Performed by: FAMILY MEDICINE

## 2025-01-13 RX ORDER — IBUPROFEN 800 MG/1
800 TABLET, FILM COATED ORAL EVERY 8 HOURS PRN
Qty: 90 TABLET | Refills: 2 | Status: SHIPPED | OUTPATIENT
Start: 2025-01-13 | End: 2025-02-12

## 2025-01-13 RX ORDER — METHYLPREDNISOLONE 4 MG/1
TABLET ORAL
Qty: 21 KIT | Refills: 0 | Status: SHIPPED | OUTPATIENT
Start: 2025-01-13

## 2025-01-13 NOTE — PROGRESS NOTES
Chief Complaint  Back Pain      Reevaluation ongoing mechanical low back pain with recurrent left leg pain and history of radiculopathy    History of Present Illness:  Alyse Reynoso is a 69 y.o. female who is a very pleasant white female type II diabetic with last A1c 7.1 is a very nice patient of Tonya Valdez CNP who is being seen today for evaluation of progressive pain mildly to the lower back with achy symptoms and limited ability to walk distances in her legs bilaterally.  She states that she has been having episodic achiness to her legs with subjective weakness for about 3 years but states that over the past years become more pronounced that if she walks more than 100 yards she will have to stop secondary to some achiness to her legs.  If she rests more than a minute or 2 it does improve to the point where she is able to resume her activities.  There is no history of injury or trauma prior to becoming symptomatic although her mother did require a laminectomy at L4-5 secondary to radiculopathy.  On questioning her last year she did have an episode of radiculitis which seem to be more right-sided in the L4 distribution but improved with relative rest.  She does not utilize any type of bracing and does deny neurogenic bowel or bladder symptoms.  She does have some discomfort with prolonged sitting as well and has had occasional nighttime discomfort.  She was seen for foot and ankle symptoms on the right last year which responded fairly well to rehabilitation but she had been having some posterior thigh and calf pain at that point as well.  She has not had recent imaging.  She seems to do better with Advil as opposed to the diclofenac.  She does deny neurogenic bowel or bladder symptoms.  Occasional nighttime discomfort noted.  She is being seen today for orthopedic and sports consultation with imaging of her spine.    We initially saw Alyse for her back on 5/3/2021 just prior to her leaving for vacation to

## 2025-01-13 NOTE — PATIENT INSTRUCTIONS
Take Medrol first for 6 days. This is a steroid pack. Flip the package over to the foil side and the directions will tell you to start with 6 pills the first day, 5 pills the second day, etc. Please do not take any other anti-inflammatories with the medrol dose octavio as this can upset your stomach. If something else is needed, you may take extra strength tylenol.     Once you are finished with the medrol, then you may start your anti-inflammatory: ibuprofen 3x/day

## 2025-01-13 NOTE — TELEPHONE ENCOUNTER
----- Message from Андрей Miguel sent at 7/21/2020  2:00 PM EDT -----  Subject: Message to Provider    QUESTIONS  Information for Provider? Pt states she has had sinus infection for a   couple weeks. she is wanting to know what to do. Please advise  ---------------------------------------------------------------------------  --------------  CALL BACK INFO  What is the best way for the office to contact you? OK to leave message on   voicemail   OK to respond with secure message via [employer] portal (only for patients   who have registered [employer] account)  Preferred Call Back Phone Number? [phone]  ---------------------------------------------------------------------------  --------------  SCRIPT ANSWERS  Relationship to Patient?  Self
Nasal decongestants, flonase, nasal saline rinse- could also be COVID- could go get tested [phone]
Pt was informed and scheduled for Flu clinic.
12

## 2025-01-13 NOTE — PROGRESS NOTES
Chief Complaint  Back Pain      ***    History of Present Illness:  Alyse Reynoso is a 69 y.o. female ***             Medical History  Patient's medications, allergies, past medical, surgical, social and family histories were reviewed and updated as appropriate.    Review of Systems  Pertinent items are noted in HPI  Review of systems reviewed from Patient History Form dated on *** and available in the patient's chart under the Media tab.       Vital Signs  There were no vitals filed for this visit.    General Exam:     Constitutional: Patient is adequately groomed with no evidence of malnutrition  DTRs: Deep tendon reflexes are intact  Mental Status: The patient is oriented to time, place and person. The patient's mood and affect are appropriate.  Lymphatic: The lymphatic examination bilaterally reveals all areas to be without enlargement or induration.  Vascular: Examination reveals no swelling or calf tenderness.  Peripheral pulses are palpable and 2+.  Neurological: The patient has good coordination.  There is no weakness or sensory deficit.      Lumbar/Sacral Spine Examination  Inspection:  ***      Palpation:  ***       Rang of Motion:  ***      Strength:  ***      Special Tests:  ***     Skin: There are no rashes, ulcerations or lesions. Distal motor sensory and vascular exams intact.     Gait: Fluid smooth gait    Reflexes:  Symmetrically preserved     Additional Comments:     Additional Examinations:  {Additional Exams:26361}      Diagnostic Test Findings:  ***      Assessment:  ***    Impression:  Encounter Diagnosis   Name Primary?    Lumbar spine pain Yes       Office Procedures:  Orders Placed This Encounter   Procedures    XR LUMBAR SPINE (2-3 VIEWS)     Standing Status:   Future     Number of Occurrences:   1     Standing Expiration Date:   1/13/2026       Treatment Plan:  Treatment options were discussed with Alyse Reynoso.  ***          This dictation was performed with a verbal recognition

## 2025-01-14 ENCOUNTER — TELEPHONE (OUTPATIENT)
Dept: ORTHOPEDIC SURGERY | Age: 70
End: 2025-01-14

## 2025-01-14 NOTE — TELEPHONE ENCOUNTER
Spoke to patient and informed them that their CT has been authorized and that they can call and schedule scan at their convenience. Also told them that they can call and schedule a f/u with Dr. Vegas once they have CT scheduled, leaving at least 2-3 days for our office to receive their results.

## 2025-01-24 ENCOUNTER — TELEPHONE (OUTPATIENT)
Dept: ORTHOPEDIC SURGERY | Age: 70
End: 2025-01-24

## 2025-01-24 NOTE — TELEPHONE ENCOUNTER
Spoke to patient about her [employer] message. Patient is reporting still feeling like she is limping and is not sure why. I talked to her at length about potential options moving forward to help her. She had a knee replacement in April 2023. She is also experiencing ongoing back/sciatica type issues in her glute down the back of her leg. So there are multiple possibilities to explain the reason for her limp.     The hiccup is that she is leaving for [place] in two days. She was supposed to get Lumbar CT prior to leaving as Dr. Vegas referred her to Dr. Giovanny Felipe when she comes back into town. I explained that there are several things she is going to need to do moving forward. 1. Find out where in [place] she can get a CT of her back - talked to her about the fact that without imaging, Dr. Felipe is going to have a difficult time giving her recommendatiosn 2. Find out where in [place] she can do some physical therapy to help her back and her limp she is describing. 3. Be sure to talk to physical therapist about her potential leg length issue has she was previously bow legged and her replaced knee is now straight 4. If leg length is significant she may need a lift in shoe - will need a place to get that unless she wants to wait until she gets back to [place] and could go to [place] or [facility]. 5. She will need to let me know where she is planning to get imaging and physical therapy done so we can fax referrals to those places.     She assures me she will get those things done and get back to me.

## 2025-03-24 ENCOUNTER — TELEPHONE (OUTPATIENT)
Dept: FAMILY MEDICINE CLINIC | Age: 70
End: 2025-03-24

## 2025-03-24 NOTE — TELEPHONE ENCOUNTER
Tried scheduling pt's medicare awv, she is in [place] until December will call  to her return back to schedule that.

## 2025-06-19 ENCOUNTER — TELEPHONE (OUTPATIENT)
Dept: FAMILY MEDICINE CLINIC | Age: 70
End: 2025-06-19

## 2025-06-19 RX ORDER — NITROFURANTOIN 25; 75 MG/1; MG/1
100 CAPSULE ORAL 2 TIMES DAILY
Qty: 20 CAPSULE | Refills: 0 | Status: SHIPPED | OUTPATIENT
Start: 2025-06-19 | End: 2025-06-29

## 2025-06-20 NOTE — TELEPHONE ENCOUNTER
After hours call  72 hr dysuria- feels likje UTI  Will cover with macrobid  Call PCP for no improvement or worsening at any time

## 2025-07-02 ENCOUNTER — TELEPHONE (OUTPATIENT)
Dept: FAMILY MEDICINE CLINIC | Age: 70
End: 2025-07-02

## 2025-07-03 ENCOUNTER — OFFICE VISIT (OUTPATIENT)
Dept: FAMILY MEDICINE CLINIC | Age: 70
End: 2025-07-03

## 2025-07-03 VITALS
SYSTOLIC BLOOD PRESSURE: 138 MMHG | BODY MASS INDEX: 33.74 KG/M2 | HEIGHT: 67 IN | DIASTOLIC BLOOD PRESSURE: 82 MMHG | HEART RATE: 104 BPM | WEIGHT: 215 LBS | OXYGEN SATURATION: 99 %

## 2025-07-03 DIAGNOSIS — M54.16 RIGHT LUMBAR RADICULITIS: ICD-10-CM

## 2025-07-03 DIAGNOSIS — M54.50 LUMBAR SPINE PAIN: ICD-10-CM

## 2025-07-03 DIAGNOSIS — E11.65 TYPE 2 DIABETES MELLITUS WITH HYPERGLYCEMIA, WITHOUT LONG-TERM CURRENT USE OF INSULIN (HCC): ICD-10-CM

## 2025-07-03 DIAGNOSIS — M43.16 SPONDYLOLISTHESIS OF LUMBAR REGION: ICD-10-CM

## 2025-07-03 DIAGNOSIS — M51.362 DEGENERATION OF INTERVERTEBRAL DISC OF LUMBAR REGION WITH DISCOGENIC BACK PAIN AND LOWER EXTREMITY PAIN: ICD-10-CM

## 2025-07-03 DIAGNOSIS — R35.0 URINARY FREQUENCY: ICD-10-CM

## 2025-07-03 DIAGNOSIS — R30.9 URINARY PAIN: Primary | ICD-10-CM

## 2025-07-03 LAB
BACTERIA URNS QL MICRO: ABNORMAL /HPF
BILIRUB UR QL STRIP.AUTO: NEGATIVE
BILIRUBIN, POC: NORMAL
BLOOD URINE, POC: NORMAL
CLARITY UR: ABNORMAL
CLARITY, POC: NORMAL
COLOR UR: YELLOW
COLOR, POC: NORMAL
CREAT UR-MCNC: 70.7 MG/DL (ref 28–259)
EPI CELLS #/AREA URNS AUTO: 1 /HPF (ref 0–5)
GLUCOSE UR STRIP.AUTO-MCNC: NEGATIVE MG/DL
GLUCOSE URINE, POC: NORMAL MG/DL
HGB UR QL STRIP.AUTO: ABNORMAL
HYALINE CASTS #/AREA URNS AUTO: 1 /LPF (ref 0–8)
KETONES UR STRIP.AUTO-MCNC: NEGATIVE MG/DL
KETONES, POC: NORMAL MG/DL
LEUKOCYTE EST, POC: NORMAL
LEUKOCYTE ESTERASE UR QL STRIP.AUTO: ABNORMAL
MICROALBUMIN UR DL<=1MG/L-MCNC: 2.19 MG/DL
MICROALBUMIN/CREAT UR: 31 MG/G (ref 0–30)
NITRITE UR QL STRIP.AUTO: NEGATIVE
NITRITE, POC: NORMAL
PH UR STRIP.AUTO: 5.5 [PH] (ref 5–8)
PH, POC: 5.5
PROT UR STRIP.AUTO-MCNC: ABNORMAL MG/DL
PROTEIN, POC: NORMAL MG/DL
RBC CLUMPS #/AREA URNS AUTO: 1 /HPF (ref 0–4)
SP GR UR STRIP.AUTO: 1.02 (ref 1–1.03)
SPECIFIC GRAVITY, POC: 1.02
UA DIPSTICK W REFLEX MICRO PNL UR: YES
URN SPEC COLLECT METH UR: ABNORMAL
UROBILINOGEN UR STRIP-ACNC: 0.2 E.U./DL
UROBILINOGEN, POC: 0.2 MG/DL
WBC #/AREA URNS AUTO: 312 /HPF (ref 0–5)

## 2025-07-03 RX ORDER — SULFAMETHOXAZOLE AND TRIMETHOPRIM 800; 160 MG/1; MG/1
1 TABLET ORAL 2 TIMES DAILY
Qty: 14 TABLET | Refills: 0 | Status: SHIPPED | OUTPATIENT
Start: 2025-07-03 | End: 2025-07-10

## 2025-07-03 RX ORDER — IBUPROFEN 200 MG
400 TABLET ORAL 2 TIMES DAILY
Qty: 60 TABLET | Refills: 2 | Status: SHIPPED | OUTPATIENT
Start: 2025-07-03 | End: 2025-08-02

## 2025-07-03 SDOH — ECONOMIC STABILITY: FOOD INSECURITY: WITHIN THE PAST 12 MONTHS, THE FOOD YOU BOUGHT JUST DIDN'T LAST AND YOU DIDN'T HAVE MONEY TO GET MORE.: NEVER TRUE

## 2025-07-03 SDOH — ECONOMIC STABILITY: FOOD INSECURITY: WITHIN THE PAST 12 MONTHS, YOU WORRIED THAT YOUR FOOD WOULD RUN OUT BEFORE YOU GOT MONEY TO BUY MORE.: NEVER TRUE

## 2025-07-03 ASSESSMENT — PATIENT HEALTH QUESTIONNAIRE - PHQ9
SUM OF ALL RESPONSES TO PHQ QUESTIONS 1-9: 0
1. LITTLE INTEREST OR PLEASURE IN DOING THINGS: NOT AT ALL
SUM OF ALL RESPONSES TO PHQ QUESTIONS 1-9: 0
2. FEELING DOWN, DEPRESSED OR HOPELESS: NOT AT ALL
SUM OF ALL RESPONSES TO PHQ QUESTIONS 1-9: 0
SUM OF ALL RESPONSES TO PHQ QUESTIONS 1-9: 0

## 2025-07-03 ASSESSMENT — ENCOUNTER SYMPTOMS
WHEEZING: 0
SHORTNESS OF BREATH: 0

## 2025-07-03 NOTE — ASSESSMENT & PLAN NOTE
Orders:    ibuprofen (ADVIL;MOTRIN) 200 MG tablet; Take 2 tablets by mouth in the morning and at bedtime

## 2025-07-03 NOTE — PROGRESS NOTES
After-Hours Call Communication: Wednesday, 7/2    Patient Concern:  Pt called stating the Macrobid that she was given 2 weeks ago for her UTI never worked. Is asking if another antibiotic can be sent. Pt is c/o urinary urgency.     Advice Given:  Advised pt to call the office to get seen and provide a urine sample since she already took an antibiotic and her symptoms haven't improved.

## 2025-07-03 NOTE — PROGRESS NOTES
Physical Exam  Vitals and nursing note reviewed.   Constitutional:       Appearance: Normal appearance. She is well-developed.   HENT:      Head: Normocephalic and atraumatic.      Right Ear: External ear normal.      Left Ear: External ear normal.      Nose: Nose normal.   Eyes:      Conjunctiva/sclera: Conjunctivae normal.   Cardiovascular:      Rate and Rhythm: Normal rate and regular rhythm.      Heart sounds: Normal heart sounds. No murmur heard.  Pulmonary:      Effort: Pulmonary effort is normal. No respiratory distress.      Breath sounds: Normal breath sounds. No wheezing or rales.   Musculoskeletal:         General: Normal range of motion.      Cervical back: Normal range of motion and neck supple.   Skin:     General: Skin is warm and dry.   Neurological:      General: No focal deficit present.      Mental Status: She is alert and oriented to person, place, and time.      Deep Tendon Reflexes: Reflexes are normal and symmetric.   Psychiatric:         Mood and Affect: Mood normal.         Behavior: Behavior normal.         Thought Content: Thought content normal.         Judgment: Judgment normal.       Vitals:    07/03/25 0813   BP: 138/82   Pulse:    SpO2:        Assessment:  Encounter Diagnoses   Name Primary?    Urinary pain Yes    Urinary frequency     Lumbar spine pain     Right lumbar radiculitis     Degeneration of intervertebral disc of lumbar region with discogenic back pain and lower extremity pain     Spondylolisthesis of lumbar region     Type 2 diabetes mellitus with hyperglycemia, without long-term current use of insulin ([facility])        Plan:  Assessment & Plan  Urinary pain       Orders:    POCT Urinalysis no Micro    Culture, Urine    Urinary frequency       Orders:    POCT Urinalysis no Micro    sulfamethoxazole-trimethoprim (BACTRIM DS) 800-160 MG per tablet; Take 1 tablet by mouth 2 times daily for 7 days    Urinalysis    Culture, Urine    Lumbar spine pain       Orders:

## 2025-07-05 LAB — BACTERIA UR CULT: NORMAL

## 2025-07-06 ENCOUNTER — RESULTS FOLLOW-UP (OUTPATIENT)
Dept: FAMILY MEDICINE CLINIC | Age: 70
End: 2025-07-06

## 2025-07-06 DIAGNOSIS — I10 ESSENTIAL HYPERTENSION: ICD-10-CM

## 2025-07-07 RX ORDER — LISINOPRIL 40 MG/1
40 TABLET ORAL DAILY
Qty: 100 TABLET | Refills: 2 | Status: SHIPPED | OUTPATIENT
Start: 2025-07-07

## 2025-07-10 DIAGNOSIS — Z98.890 HISTORY OF RECENT SURGERY: ICD-10-CM

## 2025-07-10 RX ORDER — AMOXICILLIN 500 MG/1
2000 TABLET, FILM COATED ORAL ONCE
Qty: 4 TABLET | Refills: 0 | Status: SHIPPED | OUTPATIENT
Start: 2025-07-10 | End: 2025-07-10

## 2025-08-11 ENCOUNTER — TELEPHONE (OUTPATIENT)
Dept: PHARMACY | Facility: CLINIC | Age: 70
End: 2025-08-11

## 2025-08-12 ENCOUNTER — OFFICE VISIT (OUTPATIENT)
Dept: FAMILY MEDICINE CLINIC | Age: 70
End: 2025-08-12
Payer: MEDICARE

## 2025-08-12 VITALS
WEIGHT: 210 LBS | OXYGEN SATURATION: 98 % | DIASTOLIC BLOOD PRESSURE: 78 MMHG | HEART RATE: 98 BPM | SYSTOLIC BLOOD PRESSURE: 138 MMHG | HEIGHT: 66 IN | BODY MASS INDEX: 33.75 KG/M2

## 2025-08-12 DIAGNOSIS — Z12.31 ENCOUNTER FOR SCREENING MAMMOGRAM FOR MALIGNANT NEOPLASM OF BREAST: ICD-10-CM

## 2025-08-12 DIAGNOSIS — Z78.0 POST-MENOPAUSAL: ICD-10-CM

## 2025-08-12 DIAGNOSIS — E11.65 TYPE 2 DIABETES MELLITUS WITH HYPERGLYCEMIA, WITHOUT LONG-TERM CURRENT USE OF INSULIN (HCC): ICD-10-CM

## 2025-08-12 DIAGNOSIS — Z00.00 MEDICARE ANNUAL WELLNESS VISIT, SUBSEQUENT: ICD-10-CM

## 2025-08-12 DIAGNOSIS — E78.00 HIGH CHOLESTEROL: ICD-10-CM

## 2025-08-12 DIAGNOSIS — I10 ESSENTIAL HYPERTENSION: ICD-10-CM

## 2025-08-12 DIAGNOSIS — E11.65 TYPE 2 DIABETES MELLITUS WITH HYPERGLYCEMIA, WITHOUT LONG-TERM CURRENT USE OF INSULIN (HCC): Primary | ICD-10-CM

## 2025-08-12 LAB — HBA1C MFR BLD: 7.3 %

## 2025-08-12 PROCEDURE — 3075F SYST BP GE 130 - 139MM HG: CPT | Performed by: NURSE PRACTITIONER

## 2025-08-12 PROCEDURE — 1159F MED LIST DOCD IN RCRD: CPT | Performed by: NURSE PRACTITIONER

## 2025-08-12 PROCEDURE — 3078F DIAST BP <80 MM HG: CPT | Performed by: NURSE PRACTITIONER

## 2025-08-12 PROCEDURE — G0439 PPPS, SUBSEQ VISIT: HCPCS | Performed by: NURSE PRACTITIONER

## 2025-08-12 PROCEDURE — 3051F HG A1C>EQUAL 7.0%<8.0%: CPT | Performed by: NURSE PRACTITIONER

## 2025-08-12 PROCEDURE — 1160F RVW MEDS BY RX/DR IN RCRD: CPT | Performed by: NURSE PRACTITIONER

## 2025-08-12 PROCEDURE — 1123F ACP DISCUSS/DSCN MKR DOCD: CPT | Performed by: NURSE PRACTITIONER

## 2025-08-12 PROCEDURE — 83036 HEMOGLOBIN GLYCOSYLATED A1C: CPT | Performed by: NURSE PRACTITIONER

## 2025-08-12 RX ORDER — METFORMIN HYDROCHLORIDE 500 MG/1
500 TABLET, EXTENDED RELEASE ORAL
COMMUNITY
End: 2025-08-12 | Stop reason: SDUPTHER

## 2025-08-12 RX ORDER — METFORMIN HYDROCHLORIDE 750 MG/1
750 TABLET, EXTENDED RELEASE ORAL 2 TIMES DAILY WITH MEALS
Qty: 180 TABLET | Refills: 3 | Status: SHIPPED | OUTPATIENT
Start: 2025-08-12 | End: 2025-08-13

## 2025-08-12 RX ORDER — METFORMIN HYDROCHLORIDE 500 MG/1
500 TABLET, EXTENDED RELEASE ORAL
Qty: 90 TABLET | Refills: 3 | Status: SHIPPED | OUTPATIENT
Start: 2025-08-12

## 2025-08-12 ASSESSMENT — LIFESTYLE VARIABLES
HOW MANY STANDARD DRINKS CONTAINING ALCOHOL DO YOU HAVE ON A TYPICAL DAY: 1 OR 2
HOW OFTEN DO YOU HAVE A DRINK CONTAINING ALCOHOL: 2-4 TIMES A MONTH

## 2025-08-12 ASSESSMENT — PATIENT HEALTH QUESTIONNAIRE - PHQ9
1. LITTLE INTEREST OR PLEASURE IN DOING THINGS: NOT AT ALL
2. FEELING DOWN, DEPRESSED OR HOPELESS: NOT AT ALL
SUM OF ALL RESPONSES TO PHQ QUESTIONS 1-9: 0

## 2025-08-13 ENCOUNTER — TELEPHONE (OUTPATIENT)
Dept: FAMILY MEDICINE CLINIC | Age: 70
End: 2025-08-13

## 2025-08-13 DIAGNOSIS — E11.65 TYPE 2 DIABETES MELLITUS WITH HYPERGLYCEMIA, WITHOUT LONG-TERM CURRENT USE OF INSULIN (HCC): ICD-10-CM

## 2025-08-13 LAB
ALBUMIN SERPL-MCNC: 4.7 G/DL (ref 3.4–5)
ALBUMIN/GLOB SERPL: 1.9 {RATIO} (ref 1.1–2.2)
ALP SERPL-CCNC: 49 U/L (ref 40–129)
ALT SERPL-CCNC: 22 U/L (ref 10–40)
ANION GAP SERPL CALCULATED.3IONS-SCNC: 14 MMOL/L (ref 3–16)
AST SERPL-CCNC: 20 U/L (ref 15–37)
BILIRUB SERPL-MCNC: 0.3 MG/DL (ref 0–1)
BUN SERPL-MCNC: 22 MG/DL (ref 7–20)
CALCIUM SERPL-MCNC: 9.9 MG/DL (ref 8.3–10.6)
CHLORIDE SERPL-SCNC: 102 MMOL/L (ref 99–110)
CHOLEST SERPL-MCNC: 173 MG/DL (ref 0–199)
CO2 SERPL-SCNC: 19 MMOL/L (ref 21–32)
CREAT SERPL-MCNC: 0.8 MG/DL (ref 0.6–1.2)
GFR SERPLBLD CREATININE-BSD FMLA CKD-EPI: 78 ML/MIN/{1.73_M2}
GLUCOSE SERPL-MCNC: 158 MG/DL (ref 70–99)
HDLC SERPL-MCNC: 48 MG/DL (ref 40–60)
LDLC SERPL CALC-MCNC: 97 MG/DL
POTASSIUM SERPL-SCNC: 4.3 MMOL/L (ref 3.5–5.1)
PROT SERPL-MCNC: 7.2 G/DL (ref 6.4–8.2)
SODIUM SERPL-SCNC: 135 MMOL/L (ref 136–145)
TRIGL SERPL-MCNC: 142 MG/DL (ref 0–150)
VLDLC SERPL CALC-MCNC: 28 MG/DL

## 2025-08-14 RX ORDER — METFORMIN HYDROCHLORIDE 750 MG/1
750 TABLET, EXTENDED RELEASE ORAL
Qty: 90 TABLET | Refills: 3 | Status: SHIPPED | OUTPATIENT
Start: 2025-08-14